# Patient Record
Sex: FEMALE | Race: WHITE | NOT HISPANIC OR LATINO | Employment: OTHER | URBAN - METROPOLITAN AREA
[De-identification: names, ages, dates, MRNs, and addresses within clinical notes are randomized per-mention and may not be internally consistent; named-entity substitution may affect disease eponyms.]

---

## 2017-01-04 ENCOUNTER — MYC MEDICAL ADVICE (OUTPATIENT)
Dept: FAMILY MEDICINE | Facility: CLINIC | Age: 67
End: 2017-01-04

## 2017-01-04 NOTE — TELEPHONE ENCOUNTER
amox with 1 refill rx'd last 6/16  Inquiring as to whether or not the refill was used.    Sylvie Wynn RN

## 2017-01-16 ENCOUNTER — OFFICE VISIT (OUTPATIENT)
Dept: FAMILY MEDICINE | Facility: CLINIC | Age: 67
End: 2017-01-16
Payer: COMMERCIAL

## 2017-01-16 ENCOUNTER — MYC MEDICAL ADVICE (OUTPATIENT)
Dept: FAMILY MEDICINE | Facility: CLINIC | Age: 67
End: 2017-01-16

## 2017-01-16 VITALS
BODY MASS INDEX: 41.18 KG/M2 | HEART RATE: 82 BPM | SYSTOLIC BLOOD PRESSURE: 114 MMHG | TEMPERATURE: 98.2 F | DIASTOLIC BLOOD PRESSURE: 67 MMHG | WEIGHT: 255 LBS | OXYGEN SATURATION: 97 % | RESPIRATION RATE: 18 BRPM

## 2017-01-16 DIAGNOSIS — M54.50 BILATERAL LOW BACK PAIN WITHOUT SCIATICA, UNSPECIFIED CHRONICITY: ICD-10-CM

## 2017-01-16 DIAGNOSIS — N39.0 URINARY TRACT INFECTION WITHOUT HEMATURIA, SITE UNSPECIFIED: Primary | ICD-10-CM

## 2017-01-16 LAB
ALBUMIN UR-MCNC: NEGATIVE MG/DL
APPEARANCE UR: ABNORMAL
BACTERIA #/AREA URNS HPF: ABNORMAL /HPF
BILIRUB UR QL STRIP: NEGATIVE
COLOR UR AUTO: YELLOW
GLUCOSE UR STRIP-MCNC: NEGATIVE MG/DL
HGB UR QL STRIP: ABNORMAL
KETONES UR STRIP-MCNC: NEGATIVE MG/DL
LEUKOCYTE ESTERASE UR QL STRIP: ABNORMAL
MUCOUS THREADS #/AREA URNS LPF: PRESENT /LPF
NITRATE UR QL: NEGATIVE
NON-SQ EPI CELLS #/AREA URNS LPF: ABNORMAL /LPF
PH UR STRIP: 6.5 PH (ref 5–7)
RBC #/AREA URNS AUTO: ABNORMAL /HPF (ref 0–2)
SP GR UR STRIP: 1.02 (ref 1–1.03)
URN SPEC COLLECT METH UR: ABNORMAL
UROBILINOGEN UR STRIP-ACNC: 0.2 EU/DL (ref 0.2–1)
WBC #/AREA URNS AUTO: ABNORMAL /HPF (ref 0–2)

## 2017-01-16 PROCEDURE — 87186 SC STD MICRODIL/AGAR DIL: CPT | Performed by: NURSE PRACTITIONER

## 2017-01-16 PROCEDURE — 87086 URINE CULTURE/COLONY COUNT: CPT | Performed by: NURSE PRACTITIONER

## 2017-01-16 PROCEDURE — 99214 OFFICE O/P EST MOD 30 MIN: CPT | Performed by: NURSE PRACTITIONER

## 2017-01-16 PROCEDURE — 81001 URINALYSIS AUTO W/SCOPE: CPT | Performed by: NURSE PRACTITIONER

## 2017-01-16 PROCEDURE — 87088 URINE BACTERIA CULTURE: CPT | Performed by: NURSE PRACTITIONER

## 2017-01-16 RX ORDER — CIPROFLOXACIN 250 MG/1
250 TABLET, FILM COATED ORAL 2 TIMES DAILY
Qty: 6 TABLET | Refills: 1 | Status: SHIPPED | OUTPATIENT
Start: 2017-01-16 | End: 2017-06-22

## 2017-01-16 NOTE — PROGRESS NOTES
"  SUBJECTIVE:                                                    Rhonda Trotter is a 66 year old female who presents to clinic today for the following health issues:      URINARY TRACT SYMPTOMS      Duration: Since Friday; last night got worse    Description  Pt explains she is having a very strong urge to use the bathroom, typically happening at night, happening every 20 minutes. Frequency and retention.     Pt went to Urgent care December 27 th for BM blockage? No blockage but full of stool. Pt used enema and liquid; symptoms cleared up. She has been regular since that time with her BM's.       Intensity:  moderate    Accompanying signs and symptoms:  Fever/chills: no, \"strange sensation in my core, that has happened before when I had this\".    Flank pain YES, from back injury.   Nausea and vomiting: no   Vaginal symptoms: no   Abdominal/Pelvic Pain: no     History  History of frequent UTI's: YES, not frequent. 3 times in the last year: In October, November, and during her past surgery last year.   History of kidney stones: no   Sexually Active: no   Possibility of pregnancy: No    Precipitating or alleviating factors: None    Therapies tried and outcome: Cipro: first dose seems to stop symptoms        She did have some loose stool after she was treated for constipation, which may have contributed to getting a UTI.     She fell on 12/17 and injured her low back.  Her back pain is improving, but she is still having some pain.  She denies any radicular pain, paresthesias, weakness.  She is going to her last PT appointment tomorrow after her knee replacement.       Problem list and histories reviewed & adjusted, as indicated.  Additional history: as documented    Problem list, Medication list, Allergies, and Medical/Social/Surgical histories reviewed in [facility] and updated as appropriate.    ROS:  C: NEGATIVE for fever, chills, change in weight  R: NEGATIVE for significant cough or SOB  CV: NEGATIVE for chest pain, " palpitations or peripheral edema  : see HPI    OBJECTIVE:                                                    /67 mmHg  Pulse 82  Temp(Src) 98.2  F (36.8  C) (Oral)  Resp 18  Wt 255 lb (115.667 kg)  SpO2 97%  LMP 08/01/2004  Body mass index is 41.18 kg/(m^2).  GENERAL: healthy, alert and no distress  RESP: lungs clear to auscultation - no rales, rhonchi or wheezes  CV: regular rate and rhythm, normal S1 S2, no S3 or S4, no murmur, click or rub, no peripheral edema and peripheral pulses strong  ABDOMEN: soft, nontender, no hepatosplenomegaly, no masses and bowel sounds normal    Diagnostic Test Results:  Results for orders placed or performed in visit on 01/16/17 (from the past 24 hour(s))   *UA reflex to Microscopic and Culture ([facility] [place] [facility] (except [place] and [place])   Result Value Ref Range    Color Urine Yellow     Appearance Urine Slightly Cloudy     Glucose Urine Negative NEG mg/dL    Bilirubin Urine Negative NEG    Ketones Urine Negative NEG mg/dL    Specific Gravity Urine 1.020 1.003 - 1.035    Blood Urine Small (A) NEG    pH Urine 6.5 5.0 - 7.0 pH    Protein Albumin Urine Negative NEG mg/dL    Urobilinogen Urine 0.2 0.2 - 1.0 EU/dL    Nitrite Urine Negative NEG    Leukocyte Esterase Urine Large (A) NEG    Source Midstream Urine    Urine Microscopic   Result Value Ref Range    WBC Urine  (A) 0 - 2 /HPF    RBC Urine 5-10 (A) 0 - 2 /HPF    Squamous Epithelial /LPF Urine Few FEW /LPF    Bacteria Urine Moderate (A) NEG /HPF    Mucous Urine Present (A) NEG /LPF        ASSESSMENT/PLAN:                                                            1. Urinary tract infection without hematuria, site unspecified  Increase fluids.   Discussed the use and indication of this medication as well as potential side effects.   - *UA reflex to Microscopic and Culture ([facility] and [facility] [place] [facility] (except [place] and [place])  - Urine Microscopic  - Urine Culture  Aerobic Bacterial  - ciprofloxacin (CIPRO) 250 MG tablet; Take 1 tablet (250 mg) by mouth 2 times daily  Dispense: 6 tablet; Refill: 1    2. Bilateral low back pain without sciatica, unspecified chronicity  Referral for PT given.   - [facility]FERR[facility]        Corina Lara NP  [facility]

## 2017-01-16 NOTE — NURSING NOTE
"Chief Complaint   Patient presents with     UTI       Initial /67 mmHg  Pulse 82  Temp(Src) 98.2  F (36.8  C) (Oral)  Resp 18  Wt 255 lb (115.667 kg)  SpO2 97%  LMP 08/01/2004 Estimated body mass index is 41.18 kg/(m^2) as calculated from the following:    Height as of 12/20/16: 5' 6\" (1.676 m).    Weight as of this encounter: 255 lb (115.667 kg).  BP completed using cuff size: efrem Doty Canine MA      "

## 2017-01-17 ENCOUNTER — THERAPY VISIT (OUTPATIENT)
Dept: PHYSICAL THERAPY | Facility: CLINIC | Age: 67
End: 2017-01-17
Payer: COMMERCIAL

## 2017-01-17 DIAGNOSIS — M25.561 ACUTE PAIN OF RIGHT KNEE: Primary | ICD-10-CM

## 2017-01-17 DIAGNOSIS — Z47.1 AFTERCARE FOLLOWING RIGHT KNEE JOINT REPLACEMENT SURGERY: ICD-10-CM

## 2017-01-17 DIAGNOSIS — Z96.651 AFTERCARE FOLLOWING RIGHT KNEE JOINT REPLACEMENT SURGERY: ICD-10-CM

## 2017-01-17 PROCEDURE — 97161 PT EVAL LOW COMPLEX 20 MIN: CPT | Mod: GP | Performed by: PHYSICAL THERAPIST

## 2017-01-17 PROCEDURE — 97112 NEUROMUSCULAR REEDUCATION: CPT | Mod: GP | Performed by: PHYSICAL THERAPIST

## 2017-01-17 PROCEDURE — 97110 THERAPEUTIC EXERCISES: CPT | Mod: GP | Performed by: PHYSICAL THERAPIST

## 2017-01-18 LAB
BACTERIA SPEC CULT: ABNORMAL
MICRO REPORT STATUS: ABNORMAL
MICROORGANISM SPEC CULT: ABNORMAL
SPECIMEN SOURCE: ABNORMAL

## 2017-05-16 ENCOUNTER — MYC MEDICAL ADVICE (OUTPATIENT)
Dept: FAMILY MEDICINE | Facility: CLINIC | Age: 67
End: 2017-05-16

## 2017-05-16 DIAGNOSIS — H26.9 CATARACT: Primary | ICD-10-CM

## 2017-06-19 ENCOUNTER — HOSPITAL ENCOUNTER (EMERGENCY)
Facility: CLINIC | Age: 67
Discharge: HOME | End: 2017-06-19
Attending: EMERGENCY MEDICINE | Admitting: EMERGENCY MEDICINE
Payer: COMMERCIAL

## 2017-06-19 ENCOUNTER — APPOINTMENT (OUTPATIENT)
Dept: GENERAL RADIOLOGY | Facility: CLINIC | Age: 67
End: 2017-06-19
Attending: EMERGENCY MEDICINE
Payer: COMMERCIAL

## 2017-06-19 ENCOUNTER — MYC MEDICAL ADVICE (OUTPATIENT)
Dept: FAMILY MEDICINE | Facility: CLINIC | Age: 67
End: 2017-06-19

## 2017-06-19 VITALS
TEMPERATURE: 97.9 F | RESPIRATION RATE: 16 BRPM | DIASTOLIC BLOOD PRESSURE: 86 MMHG | HEIGHT: 66 IN | BODY MASS INDEX: 41.78 KG/M2 | SYSTOLIC BLOOD PRESSURE: 163 MMHG | WEIGHT: 260 LBS | HEART RATE: 76 BPM | OXYGEN SATURATION: 98 %

## 2017-06-19 DIAGNOSIS — S52.532A CLOSED COLLES' FRACTURE OF LEFT RADIUS, INITIAL ENCOUNTER: ICD-10-CM

## 2017-06-19 PROCEDURE — 73110 X-RAY EXAM OF WRIST: CPT | Mod: LT

## 2017-06-19 PROCEDURE — 99285 EMERGENCY DEPT VISIT HI MDM: CPT | Mod: 25

## 2017-06-19 PROCEDURE — 25000132 ZZH RX MED GY IP 250 OP 250 PS 637: Performed by: EMERGENCY MEDICINE

## 2017-06-19 PROCEDURE — 40000277 XR SURGERY CARM FLUORO LESS THAN 5 MIN W STILLS

## 2017-06-19 PROCEDURE — 25605 CLTX DST RDL FX/EPHYS SEP W/: CPT | Mod: LT

## 2017-06-19 RX ORDER — OXYCODONE AND ACETAMINOPHEN 5; 325 MG/1; MG/1
2 TABLET ORAL ONCE
Status: COMPLETED | OUTPATIENT
Start: 2017-06-19 | End: 2017-06-19

## 2017-06-19 RX ORDER — OXYCODONE AND ACETAMINOPHEN 5; 325 MG/1; MG/1
1-2 TABLET ORAL EVERY 4 HOURS PRN
Qty: 15 TABLET | Refills: 0 | Status: SHIPPED | OUTPATIENT
Start: 2017-06-19 | End: 2017-06-22

## 2017-06-19 RX ADMIN — OXYCODONE HYDROCHLORIDE AND ACETAMINOPHEN 2 TABLET: 5; 325 TABLET ORAL at 10:31

## 2017-06-19 NOTE — DISCHARGE INSTRUCTIONS
Forearm Fracture That Needs to Be Set  You have a break or fracture of both bones in the forearm. The bones are out of place and must be set to make them straight again. This fracture usually takes 8 to 12 weeks to heal completely. Initial treatment is with a splint or cast. Severe injuries may need surgery to repair.    Home care    Keep your arm elevated to reduce pain and swelling.When sitting or lying down elevate your arm above the level of your heart. You can do this by placing your arm on a pillow that rests on your chest or on a pillow at your side. This is most important during the first 48 hours after injury.    Apply an ice pack over the injured area for 15 to 20 minutes every 3 to 6 hours. You should do this for the first 24 to 48 hours. You can make an ice pack by filling a plastic bag that seals at the top with ice cubes and then wrapping it with a thin towel. Be careful not to injure your skin with the ice treatments. Ice should never be applied directly to skin. As the ice melts, be careful that the cast or splint doesn t get wet. You can place the ice pack inside the sling and directly over the splint or cast. Continue with ice packs as needed for the relief of pain and swelling.    Keep the cast or splint completely dry at all times. Bathe with your cast or splint out of the water. Protect it with 2 large plastic bags, one outside of the other, each taped with duct tape at the top end. If a fiberglass splint or cast gets wet, you can dry it with a hair dryer on a cool setting.    You may use over-the-counter pain medicine to control pain, unless another pain medicine was prescribed. If you have chronic liver or kidney disease or ever had a stomach ulcer or GI bleeding, talk with your healthcare provider before using these medicines.  Follow-up care  Follow up with your healthcare provider, or as advised. If a splint was applied, it may be changed to a cast during your follow-up visit.  There is a  chance that the fractures will move out of place again before the ends begin to seal together. This usually happens during the first week. Therefore, it is important that you follow-up as directed for another X-ray. If X-rays were taken, you will be told of any new findings that may affect your care.  When to seek medical advice  Call your healthcare provider right away if any of the following occur:    The plaster cast or splint becomes wet or soft    The fiberglass cast or splint stays wet for more than 24 hours    Increased tightness, looseness, or pain occurs under the cast or splint    Fingers become swollen, cold, blue, numb, or tingly    The cast develops a foul odor  Date Last Reviewed: 12/3/2015    6264-3909 [employer]. [address] [place] [address]. All rights reserved. This information is not intended as a substitute for professional medical care. Always follow your healthcare professional's instructions.      Opioid Medication Information    You have been given a prescription for an opioid (narcotic) pain medicine and/or have received a pain medicine while here in the Emergency Department. These medicines can make you drowsy or impaired. You must not drive, operate dangerous equipment, or engage in any other dangerous activities while taking these medications. If you drive while taking these medications, you could be arrested for DUI, or driving under the influence. Do not drink any alcohol while you are taking these medications.   Opioid pain medications can cause addiction. If you have a history of chemical dependency of any type, you are at a higher risk of becoming addicted to pain medications.  Only take these prescribed medications to treat your pain when all other options have been tried. Take it for as short a time and as few doses as possible. Store your pain pills in a secure place, as they are frequently stolen and provide a dangerous opportunity for children or  visitors in your house to start abusing these powerful medications. We will not replace any lost or stolen medicine.  As soon as your pain is better, you should flush all your remaining medication.   Many prescription pain medications contain Tylenol  (acetaminophen), including Vicodin , Tylenol #3 , Norco , Lortab , and Percocet .  You should not take any extra pills of Tylenol  if you are using these prescription medications or you can get very sick.  Do not ever take more than 4000 mg of acetaminophen in any 24 hour period.  All opioids tend to cause constipation. Drink plenty of water and eat foods that have a lot of fiber, such as fruits, vegetables, prune juice, apple juice and high fiber cereal.  Take a laxative if you don t move your bowels at least every other day. Miralax , Milk of Magnesia, Colace , or Senna  can be used to keep you regular.

## 2017-06-19 NOTE — ED PROVIDER NOTES
History     Chief Complaint:  Wrist Pain      HPI   Rhonda Trotter is a 66 year old female with a history of arthritis who presents to the emergency department today for evaluation of wrist pain. The patient tripped on the edge of her patio and subsequently fell on concrete. Incident occurred this morning. She presents with left wrist pain that did not improve with ice as well as an abrasion to her right knee. She has no knee pain. She did injure her right hand when she was 11 and 21 years old. She has swelling     Allergies:  No Known Drug Allergies    Medications:    ciprofloxacin (CIPRO) 250 MG tablet  cyclobenzaprine (FLEXERIL) 10 MG tablet  acetaminophen (TYLENOL) 325 MG tablet  aspirin EC 81 MG tablet  diphenhydrAMINE (BENADRYL) 25 MG capsule  simvastatin (ZOCOR) 20 MG tablet  lisinopril-hydrochlorothiazide (PRINZIDE,ZESTORETIC) 20-12.5 MG per tablet  fluticasone (FLONASE) 50 MCG/ACT nasal spray  Cobalamine Combinations (B-12) 100-5000 MCG SUBL  Calcium-Phosphorus-Vitamin D (CALCIUM GUMMIES) 250-100-500 MG-MG-UNIT CHEW  multivitamin, therapeutic with minerals (THERA-VIT-M) TABS  levothyroxine (SYNTHROID,LEVOTHROID) 100 MCG tablet  calcium carbonate (TUMS) 500 MG chewable tablet     Past Medical History:    Arthritis   Obesity, unspecified   Pure hypercholesterolemia   Unspecified essential hypertension   Unspecified hypothyroidism     Past Surgical History:    ARTHROPLASTY KNEE Left 2/3/2016  breast biopsy was negative  COLONOSCOPY-every 5 years starting at age 52  SURGICAL HISTORY OF - wisdom teeth extraction    Family History:    Father: CAD, HTN, prostate cancer   Brother: CAD, MI  Mother: Colorectal cancer, MI, obesity   Paternal Uncle: Respiratory, heart disease  Daughter: Gynecology   Sister: Lipids, Pulmonary fibrosis, HTN, obesity     Social History:  The patient was accompanied to the ED by mother.  Smoking Status: Never smoker  Smokeless Tobacco: Never used  Alcohol Use: Yes  Marital Status:   "Single [1]     Review of Systems   Musculoskeletal:        Wrist pain   All other systems reviewed and are negative.    Physical Exam   Vitals:  Patient Vitals for the past 24 hrs:   BP Temp Temp src Pulse Resp SpO2 Height Weight   06/19/17 0935 - - - - - - 1.676 m (5' 6\") 117.9 kg (260 lb)   06/19/17 0932 163/86 97.9  F (36.6  C) Oral 76 16 97 % - -     Physical Exam  GENERAL: well developed, pleasant  HEAD: atraumatic  EYES: pupils reactive, extraocular muscles intact, conjunctivae normal  ENT:  mucus membranes moist  NECK:  trachea midline, normal range of motion  RESPIRATORY: no tachypnea, breath sounds clear to auscultation   CVS: normal S1/S2, no murmurs, intact distal pulses  ABDOMEN: soft, nontender, nondistention  MUSCULOSKELETAL:  Pain to the distal wrist. Skin intact. No obvious deformity.   SKIN: warm and dry, no acute rashes or ulceration. Abrasion to the right knee.  NEURO: GCS 15, cranial nerves intact, alert and oriented x3  PSYCH:  Mood/affect normal    Emergency Department Course   Imaging:  Radiology findings were communicated with the patient who voiced understanding of the findings.    Wrist XR, 3 Views, left:  There is a compression fracture of the distal radius with  dorsal angulation of the distal fracture fragment. No evidence for  intra-articular extension. Suspect mildly displaced ulnar styloid  fracture as well, although it is possible that this is chronic.   Reading per radiology    XR Surgery PAULA L/T 5 Min Fluoro w/ Stills:  There is a compression fracture of the distal radius with  dorsal angulation of the distal fracture fragment. No evidence for  intra-articular extension. Suspect mildly displaced ulnar styloid  fracture as well, although it is possible that this is chronic.   Reading per radiology    Procedures:    Reduction     SITE: Left wrist     PROCEDURE PROVIDER: Dr. Ortega      CONSENT: Risks (including but not limited to: decreased respirations, oxygen perfusion, aspiration, " hypotension), benefits, and alternatives were discussed with patient and consent for procedure was obtained.     REDUCTION COMMENTS: The patient's wrist was held in traction and injury mechanism was exaggerated and fracture was reduced.  C arm was used to note alignment.     Splint Placement    PLACEMENT: Custom Orthoglass splint was applied to the left extremity and after placement I checked and adjusted the fit to ensure proper positioning. The patient was more comfortable with the splint in place. Sensation and circulation are intact after splint placement.     Interventions:  1031- Percocet 2 tablets Oral        Emergency Department Course:  Nursing notes and vitals reviewed.  I performed an exam of the patient as documented above.       I discussed the treatment plan with the patient. They expressed understanding of this plan and consented to discharge.    I personally reviewed the laboratory results with the Patient and answered all related questions prior to discharge.    Impression & Plan      Medical Decision Making:  Rhonda Trotter is a 66 year old female who presents with wrist pain after a fall. XR shows angulation. She was hung in traps. Portable C arm showed good ailment with just the traps and traction. Manipulation was also performed and XR again looked to be in good alignment. Splint was applied by the tech and myself.      Diagnosis:    ICD-10-CM    1. Closed Colles' fracture of left radius, initial encounter S52.532A          Disposition:   The patient was discharged.    Discharge Medications:  Discharge Medication List as of 6/19/2017 12:00 PM      START taking these medications    Details   oxyCODONE-acetaminophen (PERCOCET) 5-325 MG per tablet Take 1-2 tablets by mouth every 4 hours as needed for pain, Disp-15 tablet, R-0, Local Print             Scribe Disclosure:  Bubba BAEZA am serving as a scribe at 9:51 AM on 6/19/2017 to document services personally performed by Norris Ortega,  MD, based on my observations and the provider's statements to me.    6/19/2017    EMERGENCY DEPARTMENT       Norris Ortega MD  06/21/17 1132

## 2017-06-19 NOTE — ED AVS SNAPSHOT
Emergency Department    [address] [facility] [address]    Phone:  [phone]    Fax:  [phone]                                       Rhonda Trotter   MRN: [id number]    Department:   Emergency Department   Date of Visit:  6/19/2017           Patient Information     Date Of Birth          1950        Your diagnoses for this visit were:     Closed Colles' fracture of left radius, initial encounter        You were seen by Norris Ortega MD.      Follow-up Information     Follow up with Lazara Ledbetter MD.    Specialty:  Orthopedics    Contact information:    [facility]  [address]  [facility] [address]  [phone]          Discharge Instructions         Forearm Fracture That Needs to Be Set  You have a break or fracture of both bones in the forearm. The bones are out of place and must be set to make them straight again. This fracture usually takes 8 to 12 weeks to heal completely. Initial treatment is with a splint or cast. Severe injuries may need surgery to repair.    Home care    Keep your arm elevated to reduce pain and swelling.When sitting or lying down elevate your arm above the level of your heart. You can do this by placing your arm on a pillow that rests on your chest or on a pillow at your side. This is most important during the first 48 hours after injury.    Apply an ice pack over the injured area for 15 to 20 minutes every 3 to 6 hours. You should do this for the first 24 to 48 hours. You can make an ice pack by filling a plastic bag that seals at the top with ice cubes and then wrapping it with a thin towel. Be careful not to injure your skin with the ice treatments. Ice should never be applied directly to skin. As the ice melts, be careful that the cast or splint doesn t get wet. You can place the ice pack inside the sling and directly over the splint or cast. Continue with ice packs as needed for the relief of pain and swelling.    Keep  the cast or splint completely dry at all times. Bathe with your cast or splint out of the water. Protect it with 2 large plastic bags, one outside of the other, each taped with duct tape at the top end. If a fiberglass splint or cast gets wet, you can dry it with a hair dryer on a cool setting.    You may use over-the-counter pain medicine to control pain, unless another pain medicine was prescribed. If you have chronic liver or kidney disease or ever had a stomach ulcer or GI bleeding, talk with your healthcare provider before using these medicines.  Follow-up care  Follow up with your healthcare provider, or as advised. If a splint was applied, it may be changed to a cast during your follow-up visit.  There is a chance that the fractures will move out of place again before the ends begin to seal together. This usually happens during the first week. Therefore, it is important that you follow-up as directed for another X-ray. If X-rays were taken, you will be told of any new findings that may affect your care.  When to seek medical advice  Call your healthcare provider right away if any of the following occur:    The plaster cast or splint becomes wet or soft    The fiberglass cast or splint stays wet for more than 24 hours    Increased tightness, looseness, or pain occurs under the cast or splint    Fingers become swollen, cold, blue, numb, or tingly    The cast develops a foul odor  Date Last Reviewed: 12/3/2015    6314-6930 The for; to (do). [address] [place] [address]. All rights reserved. This information is not intended as a substitute for professional medical care. Always follow your healthcare professional's instructions.      Opioid Medication Information    You have been given a prescription for an opioid (narcotic) pain medicine and/or have received a pain medicine while here in the Emergency Department. These medicines can make you drowsy or impaired. You must not drive, operate  dangerous equipment, or engage in any other dangerous activities while taking these medications. If you drive while taking these medications, you could be arrested for DUI, or driving under the influence. Do not drink any alcohol while you are taking these medications.   Opioid pain medications can cause addiction. If you have a history of chemical dependency of any type, you are at a higher risk of becoming addicted to pain medications.  Only take these prescribed medications to treat your pain when all other options have been tried. Take it for as short a time and as few doses as possible. Store your pain pills in a secure place, as they are frequently stolen and provide a dangerous opportunity for children or visitors in your house to start abusing these powerful medications. We will not replace any lost or stolen medicine.  As soon as your pain is better, you should flush all your remaining medication.   Many prescription pain medications contain Tylenol  (acetaminophen), including Vicodin , Tylenol #3 , Norco , Lortab , and Percocet .  You should not take any extra pills of Tylenol  if you are using these prescription medications or you can get very sick.  Do not ever take more than 4000 mg of acetaminophen in any 24 hour period.  All opioids tend to cause constipation. Drink plenty of water and eat foods that have a lot of fiber, such as fruits, vegetables, prune juice, apple juice and high fiber cereal.  Take a laxative if you don t move your bowels at least every other day. Miralax , Milk of Magnesia, Colace , or Senna  can be used to keep you regular.            Future Appointments        Provider Department Dept Phone Center    6/22/2017 4:00 PM Corina Lara NP [facility] [phone]       24 Hour Appointment Hotline       To make an appointment at any [facility], call [phone] ([phone]). If you don't have a family doctor or clinic, we will help you find one.  [facility] are conveniently located to serve the needs of you and your family.             Review of your medicines      START taking        Dose / Directions Last dose taken    oxyCODONE-acetaminophen 5-325 MG per tablet   Commonly known as:  PERCOCET   Dose:  1-2 tablet   Quantity:  15 tablet        Take 1-2 tablets by mouth every 4 hours as needed for pain   Refills:  0          Our records show that you are taking the medicines listed below. If these are incorrect, please call your family doctor or clinic.        Dose / Directions Last dose taken    acetaminophen 325 MG tablet   Commonly known as:  TYLENOL   Dose:  650 mg        Take 650 mg by mouth every 4 hours as needed for mild pain   Refills:  0        aspirin EC 81 MG EC tablet   Dose:  81 mg        Take 1 tablet (81 mg) by mouth daily   Refills:  0        B-12 100-5000 MCG Subl   Dose:  1 tablet   Quantity:  50 tablet        Place 1 tablet under the tongue daily as needed   Refills:  0        calcium carbonate 500 MG chewable tablet   Commonly known as:  TUMS   Dose:  1-2 chew tab        Take 1-2 chew tab by mouth daily as needed for heartburn   Refills:  0        Calcium-Phosphorus-Vitamin D 250-100-500 MG-MG-UNIT Chew   Commonly known as:  CALCIUM GUMMIES   Dose:  2 tablet        Take 2 tablets by mouth daily as needed   Refills:  0        ciprofloxacin 250 MG tablet   Commonly known as:  CIPRO   Dose:  250 mg   Quantity:  6 tablet        Take 1 tablet (250 mg) by mouth 2 times daily   Refills:  1        cyclobenzaprine 10 MG tablet   Commonly known as:  FLEXERIL   Dose:  5-10 mg   Quantity:  30 tablet        Take 0.5-1 tablets (5-10 mg) by mouth 3 times daily as needed for muscle spasms   Refills:  1        diphenhydrAMINE 25 MG capsule   Commonly known as:  BENADRYL   Dose:  25 mg   Quantity:  56 capsule        Take 1 capsule (25 mg) by mouth nightly as needed for sleep   Refills:  0        fluticasone 50 MCG/ACT spray   Commonly known as:   FLONASE   Dose:  2 spray   Quantity:  1 Bottle        Spray 2 sprays into both nostrils daily as needed   Refills:  11        levothyroxine 100 MCG tablet   Commonly known as:  SYNTHROID/LEVOTHROID   Dose:  100 mcg   Quantity:  90 tablet        Take 1 tablet (100 mcg) by mouth daily   Refills:  PRN        lisinopril-hydrochlorothiazide 20-12.5 MG per tablet   Commonly known as:  PRINZIDE/ZESTORETIC   Dose:  1 tablet   Quantity:  90 tablet        Take 1 tablet by mouth daily   Refills:  PRN        multivitamin, therapeutic with minerals Tabs tablet   Dose:  1 tablet   Quantity:  30 each        Take 1 tablet by mouth daily   Refills:  0        simvastatin 20 MG tablet   Commonly known as:  ZOCOR   Dose:  20 mg   Quantity:  90 tablet        Take 1 tablet (20 mg) by mouth At Bedtime at bedtime   Refills:  PRN                Prescriptions were sent or printed at these locations (1 Prescription)                   Other Prescriptions                Printed at Department/Unit printer (1 of 1)         oxyCODONE-acetaminophen (PERCOCET) 5-325 MG per tablet                Procedures and tests performed during your visit     Wrist XR, G/E 3 views, left    XR Surgery PAULA L/T 5 Min Fluoro w Stills      Orders Needing Specimen Collection     None      Pending Results     Date and Time Order Name Status Description    6/19/2017 1116 XR Surgery PAULA L/T 5 Min Fluoro w Stills In process             Pending Culture Results     No orders found from 6/17/2017 to 6/20/2017.            Pending Results Instructions     If you had any lab results that were not finalized at the time of your Discharge, you can call the ED Lab Result RN at [phone]. You will be contacted by this team for any positive Lab results or changes in treatment. The nurses are available 7 days a week from 10A to 6:30P.  You can leave a message 24 hours per day and they will return your call.        Test Results From Your Hospital Stay        6/19/2017 10:07 AM       Narrative     WRIST LEFT THREE OR MORE VIEWS  6/19/2017 9:52 AM    HISTORY:  Status post fall.    COMPARISON:  None.        Impression     IMPRESSION:  There is a compression fracture of the distal radius with  dorsal angulation of the distal fracture fragment. No evidence for  intra-articular extension. Suspect mildly displaced ulnar styloid  fracture as well, although it is possible that this is chronic.     TAMMIE RODRIGUEZ MD         6/19/2017 11:17 AM      Result not yet available     Exam Ended                Clinical Quality Measure: Blood Pressure Screening     Your blood pressure was checked while you were in the emergency department today. The last reading we obtained was  BP: 163/86 . Please read the guidelines below about what these numbers mean and what you should do about them.  If your systolic blood pressure (the top number) is less than 120 and your diastolic blood pressure (the bottom number) is less than 80, then your blood pressure is normal. There is nothing more that you need to do about it.  If your systolic blood pressure (the top number) is 120-139 or your diastolic blood pressure (the bottom number) is 80-89, your blood pressure may be higher than it should be. You should have your blood pressure rechecked within a year by a primary care provider.  If your systolic blood pressure (the top number) is 140 or greater or your diastolic blood pressure (the bottom number) is 90 or greater, you may have high blood pressure. High blood pressure is treatable, but if left untreated over time it can put you at risk for heart attack, stroke, or kidney failure. You should have your blood pressure rechecked by a primary care provider within the next 4 weeks.  If your provider in the emergency department today gave you specific instructions to follow-up with your doctor or provider even sooner than that, you should follow that instruction and not wait for up to 4 weeks for your follow-up visit.         Thank you for choosing [facility]       Thank you for choosing [facility] for your care. Our goal is always to provide you with excellent care. Hearing back from our patients is one way we can continue to improve our services. Please take a few minutes to complete the written survey that you may receive in the mail after you visit with us. Thank you!        [employer] Information     "[employer]" gives you secure access to your electronic health record. If you see a primary care provider, you can also send messages to your care team and make appointments. If you have questions, please call your primary care clinic.  If you do not have a primary care provider, please call [phone] and they will assist you.        Care EveryWhere ID     This is your Care EveryWhere ID. This could be used by other organizations to access your [facility] medical records  [id number]        After Visit Summary       This is your record. Keep this with you and show to your community pharmacist(s) and doctor(s) at your next visit.

## 2017-06-19 NOTE — ED AVS SNAPSHOT
Emergency Department    [address] [place] [address]    Phone:  [phone]    Fax:  [phone]                                       Rhonda Trotter   MRN: [id number]    Department:   Emergency Department   Date of Visit:  6/19/2017           After Visit Summary Signature Page     I have received my discharge instructions, and my questions have been answered. I have discussed any challenges I see with this plan with the nurse or doctor.    ..........................................................................................................................................  Patient/Patient Representative Signature      ..........................................................................................................................................  Patient Representative Print Name and Relationship to Patient    ..................................................               ................................................  Date                                            Time    ..........................................................................................................................................  Reviewed by Signature/Title    ...................................................              ..............................................  Date                                                            Time

## 2017-06-21 ENCOUNTER — TRANSFERRED RECORDS (OUTPATIENT)
Dept: HEALTH INFORMATION MANAGEMENT | Facility: CLINIC | Age: 67
End: 2017-06-21

## 2017-06-21 ENCOUNTER — MYC MEDICAL ADVICE (OUTPATIENT)
Dept: FAMILY MEDICINE | Facility: CLINIC | Age: 67
End: 2017-06-21

## 2017-06-22 ENCOUNTER — OFFICE VISIT (OUTPATIENT)
Dept: FAMILY MEDICINE | Facility: CLINIC | Age: 67
End: 2017-06-22
Payer: COMMERCIAL

## 2017-06-22 VITALS
BODY MASS INDEX: 42.45 KG/M2 | TEMPERATURE: 98.2 F | DIASTOLIC BLOOD PRESSURE: 70 MMHG | OXYGEN SATURATION: 98 % | WEIGHT: 263 LBS | SYSTOLIC BLOOD PRESSURE: 107 MMHG | HEART RATE: 84 BPM

## 2017-06-22 DIAGNOSIS — I10 BENIGN ESSENTIAL HYPERTENSION: ICD-10-CM

## 2017-06-22 DIAGNOSIS — E66.01 MORBID OBESITY WITH BMI OF 40.0-44.9, ADULT (H): ICD-10-CM

## 2017-06-22 DIAGNOSIS — Z01.818 PREOP GENERAL PHYSICAL EXAM: Primary | ICD-10-CM

## 2017-06-22 DIAGNOSIS — S52.532A CLOSED COLLES' FRACTURE OF LEFT RADIUS, INITIAL ENCOUNTER: ICD-10-CM

## 2017-06-22 LAB — HGB BLD-MCNC: 13.5 G/DL (ref 11.7–15.7)

## 2017-06-22 PROCEDURE — 36415 COLL VENOUS BLD VENIPUNCTURE: CPT | Performed by: NURSE PRACTITIONER

## 2017-06-22 PROCEDURE — 80048 BASIC METABOLIC PNL TOTAL CA: CPT | Performed by: NURSE PRACTITIONER

## 2017-06-22 PROCEDURE — 85018 HEMOGLOBIN: CPT | Performed by: NURSE PRACTITIONER

## 2017-06-22 PROCEDURE — 99214 OFFICE O/P EST MOD 30 MIN: CPT | Performed by: NURSE PRACTITIONER

## 2017-06-22 NOTE — MR AVS SNAPSHOT
After Visit Summary   6/22/2017    Rhonda Trotter    MRN: [id number]           Patient Information     Date Of Birth          1950        Visit Information        Provider Department      6/22/2017 4:00 PM Corina Lara NP [facility]        Today's Diagnoses     Preop general physical exam    -  1    Closed Colles' fracture of left radius, initial encounter        Benign essential hypertension          Care Instructions      Before Your Surgery      Call your surgeon if there is any change in your health. This includes signs of a cold or flu (such as a sore throat, runny nose, cough, rash or fever).    Do not smoke, drink alcohol or take over the counter medicine (unless your surgeon or primary care doctor tells you to) for the 24 hours before and after surgery.    If you take prescribed drugs: Follow your doctor s orders about which medicines to take and which to stop until after surgery.    Eating and drinking prior to surgery: follow the instructions from your surgeon    Take a shower or bath the night before surgery. Use the soap your surgeon gave you to gently clean your skin. If you do not have soap from your surgeon, use your regular soap. Do not shave or scrub the surgery site.  Wear clean pajamas and have clean sheets on your bed.           Follow-ups after your visit        Who to contact     If you have questions or need follow up information about today's clinic visit or your schedule please contact [facility] directly at [phone].  Normal or non-critical lab and imaging results will be communicated to you by MyChart, letter or phone within 4 business days after the clinic has received the results. If you do not hear from us within 7 days, please contact the clinic through MyChart or phone. If you have a critical or abnormal lab result, we will notify you by phone as soon as possible.  Submit refill requests through [employer] or call  your pharmacy and they will forward the refill request to us. Please allow 3 business days for your refill to be completed.          Additional Information About Your Visit        MyChart Information     [employer] gives you secure access to your electronic health record. If you see a primary care provider, you can also send messages to your care team and make appointments. If you have questions, please call your primary care clinic.  If you do not have a primary care provider, please call [phone] and they will assist you.        Care EveryWhere ID     This is your Care EveryWhere ID. This could be used by other organizations to access your [place] medical records  [id number]        Your Vitals Were     Pulse Temperature Last Period Pulse Oximetry BMI (Body Mass Index)       84 98.2  F (36.8  C) (Oral) 08/01/2004 98% 42.45 kg/m2        Blood Pressure from Last 3 Encounters:   06/22/17 107/70   06/19/17 163/86   01/16/17 114/67    Weight from Last 3 Encounters:   06/22/17 263 lb (119.3 kg)   06/19/17 260 lb (117.9 kg)   01/16/17 255 lb (115.7 kg)              We Performed the Following     Basic metabolic panel     Hemoglobin        Primary Care Provider Office Phone # Fax #    Corina Lara [phone]       [facility] [address] [facility] [address]        Equal Access to Services     ALAN PEÑALOZA : Hadii aad ku hadasho Soomaali, waaxda luqadaha, qaybta kaalmada adeegyada, evie giles . So [facility] [phone].    ATENCIÓN: Si habla español, tiene a clayton disposición servicios gratuitos de asistencia lingüística. López al [phone].    We comply with applicable federal civil rights laws and [place] laws. We do not discriminate on the basis of race, color, national origin, age, disability sex, sexual orientation or gender identity.            Thank you!     Thank you for choosing [facility]  for your care. Our goal is  always to provide you with excellent care. Hearing back from our patients is one way we can continue to improve our services. Please take a few minutes to complete the written survey that you may receive in the mail after your visit with us. Thank you!             Your Updated Medication List - Protect others around you: Learn how to safely use, store and throw away your medicines at www.disposemymeds.org.          This list is accurate as of: 6/22/17  5:04 PM.  Always use your most recent med list.                   Brand Name Dispense Instructions for use Diagnosis    acetaminophen 325 MG tablet    TYLENOL     Take 650 mg by mouth every 4 hours as needed for mild pain        aspirin EC 81 MG EC tablet      Take 1 tablet (81 mg) by mouth daily    Aftercare following knee joint replacement surgery, unspecified laterality       B-12 100-5000 MCG Subl     50 tablet    Place 1 tablet under the tongue daily as needed    Aftercare following knee joint replacement surgery, unspecified laterality       calcium carbonate 500 MG chewable tablet    TUMS     Take 1-2 chew tab by mouth daily as needed for heartburn        Calcium-Phosphorus-Vitamin D 250-100-500 MG-MG-UNIT Chew    CALCIUM GUMMIES     Take 2 tablets by mouth daily as needed    Preop general physical exam       diphenhydrAMINE 25 MG capsule    BENADRYL    56 capsule    Take 1 capsule (25 mg) by mouth nightly as needed for sleep    Aftercare following knee joint replacement surgery, unspecified laterality       fluticasone 50 MCG/ACT spray    FLONASE    1 Bottle    Spray 2 sprays into both nostrils daily as needed    Chronic rhinitis       levothyroxine 100 MCG tablet    SYNTHROID/LEVOTHROID    90 tablet    Take 1 tablet (100 mcg) by mouth daily    Hypothyroidism, unspecified type       lisinopril-hydrochlorothiazide 20-12.5 MG per tablet    PRINZIDE/ZESTORETIC    90 tablet    Take 1 tablet by mouth daily    Essential hypertension with goal blood pressure less  than 140/90       multivitamin, therapeutic with minerals Tabs tablet     30 each    Take 1 tablet by mouth daily    Preop general physical exam       simvastatin 20 MG tablet    ZOCOR    90 tablet    Take 1 tablet (20 mg) by mouth At Bedtime at bedtime    Hyperlipidemia LDL goal <130

## 2017-06-22 NOTE — NURSING NOTE
"Chief Complaint   Patient presents with     Pre-Op Exam     ER F/U     Dizziness     x 1 month. BPPV?? noticing when in bed and rolling over.        Initial /70  Pulse 84  Temp 98.2  F (36.8  C) (Oral)  Wt 263 lb (119.3 kg)  LMP 08/01/2004  SpO2 98%  BMI 42.45 kg/m2 Estimated body mass index is 42.45 kg/(m^2) as calculated from the following:    Height as of 6/19/17: 5' 6\" (1.676 m).    Weight as of this encounter: 263 lb (119.3 kg).  Medication Reconciliation: complete     Lalitha Almonte MA      "

## 2017-06-22 NOTE — PROGRESS NOTES
[address] [address]  [phone]  Dept: [phone]    PRE-OP EVALUATION:  Today's date: 2017    Rhonda Trotter (: 1950) presents for pre-operative evaluation assessment as requested by Dr. Ledbetter.  She requires evaluation and anesthesia risk assessment prior to undergoing surgery/procedure for treatment of compression fracture of the distal left radius .  Proposed procedure: ORIF left radius    Date of Surgery/ Procedure: 2017  Time of Surgery/ Procedure: check in at 12:30   Hospital/Surgical Facility: [facility]   Fax number for surgical facility:   Primary Physician: Corina Lara  Type of Anesthesia Anticipated: to be determined    Patient has a Health Care Directive or Living Will:  YES     Preop Questions 2017   1.  Do you have a history of heart attack, stroke, stent, bypass or surgery on an artery in the head, neck, heart or legs? No   2.  Do you ever have any pain or discomfort in your chest? No   3.  Do you have a history of  Heart Failure? No   4.   Are you troubled by shortness of breath when:  walking on a level surface, or up a slight hill, or at night? No   5.  Do you currently have a cold, bronchitis or other respiratory infection? No   6.  Do you have a cough, shortness of breath, or wheezing? No   7.  Do you sometimes get pains in the calves of your legs when you walk? No   8. Do you or anyone in your family have previous history of blood clots? YES - Pt's sister had one episode    9.  Do you or does anyone in your family have a serious bleeding problem such as prolonged bleeding following surgeries or cuts? No   10. Have you ever had problems with anemia or been told to take iron pills? No   11. Have you had any abnormal blood loss such as black, tarry or bloody stools, or abnormal vaginal bleeding? No   12. Have you ever had a blood transfusion? No   13. Have you or any of your relatives ever had  problems with anesthesia? No   14. Do you have sleep apnea, excessive snoring or daytime drowsiness? No   15. Do you have any prosthetic heart valves? No   16. Do you have prosthetic joints? YES - knee   17. Is there any chance that you may be pregnant? No         HPI:                                                      Brief HPI related to upcoming procedure: Fell and sustained a compression fracture distal left radius.       HYPERTENSION - Patient has longstanding history of mod-severe HTN , currently denies any symptoms referable to elevated blood pressure. Specifically denies chest pain, palpitations, dyspnea, orthopnea, PND or peripheral edema. Blood pressure readings have been in normal range. Current medication regimen is as listed below. Patient denies any side effects of medication.                                                                                                                                                                                          .    MEDICAL HISTORY:                                                      Patient Active Problem List    Diagnosis Date Noted     Right knee pain 10/24/2016     Priority: Medium     Aftercare following right knee joint replacement surgery 10/24/2016     Priority: Medium     Benign essential hypertension 10/20/2016     Priority: Medium     Physical deconditioning 10/17/2016     Priority: Medium     BMI of 40.0-44.9, adult (H) 09/15/2016     Priority: Medium     Left knee pain 02/17/2016     Priority: Medium     Other orthopedic aftercare(V54.89) 02/17/2016     Priority: Medium     Advance care planning 02/16/2016     Priority: Medium     Advance Care Planning 2/16/2016: Receipt of ACP document:  Received: Health Care Directive which was witnessed or notarized on 12/17/14.  Document previously scanned on 2/3/16.  Validation form completed and sent to be scanned.  Code Status reflects choices in most recent ACP document.  Confirmed/documented  designated decision maker(s).  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 Provider to review        Past Medical History:   Diagnosis Date     Arthritis      Obesity, unspecified      Pure hypercholesterolemia      Unspecified essential hypertension      Unspecified hypothyroidism      Past Surgical History:   Procedure Laterality Date     ARTHROPLASTY KNEE Left 2/3/2016    Procedure: ARTHROPLASTY KNEE;  Surgeon: Abdiel Ledesma MD;  Location:  OR     ARTHROPLASTY KNEE Right 10/11/2016    Procedure: ARTHROPLASTY KNEE;  Surgeon: Abdiel Ledesma MD;  Location:  OR     BIOPSY  August 2014    breast biopsy was negative     COLONOSCOPY  2013    every 5 years starting at age 52     SURGICAL HISTORY OF -       wisdom teeth extraction     Current Outpatient Prescriptions   Medication Sig Dispense Refill     acetaminophen (TYLENOL) 325 MG tablet Take 650 mg by mouth every 4 hours as needed for mild pain       diphenhydrAMINE (BENADRYL) 25 MG capsule Take 1 capsule (25 mg) by mouth nightly as needed for sleep 56 capsule      simvastatin (ZOCOR) 20 MG tablet Take 1 tablet (20 mg) by mouth At Bedtime at bedtime 90 tablet PRN     lisinopril-hydrochlorothiazide (PRINZIDE,ZESTORETIC) 20-12.5 MG per tablet Take 1 tablet by mouth daily 90 tablet PRN     fluticasone (FLONASE) 50 MCG/ACT nasal spray Spray 2 sprays into both nostrils daily as needed 1  Bottle 11     Cobalamine Combinations (B-12) 100-5000 MCG SUBL Place 1 tablet under the tongue daily as needed 50 tablet 0     Calcium-Phosphorus-Vitamin D (CALCIUM GUMMIES) 250-100-500 MG-MG-UNIT CHEW Take 2 tablets by mouth daily as needed       multivitamin, therapeutic with minerals (THERA-VIT-M) TABS Take 1 tablet by mouth daily 30 each 0     levothyroxine (SYNTHROID,LEVOTHROID) 100 MCG tablet Take 1 tablet (100 mcg) by mouth daily 90 tablet PRN     calcium carbonate (TUMS) 500 MG chewable tablet Take 1-2 chew tab by mouth daily as needed for heartburn       aspirin EC 81 MG tablet Take 1 tablet (81 mg) by mouth daily (Patient not taking: Reported on 6/22/2017)       OTC products: None, except as noted above    Allergies   Allergen Reactions     No Known Drug Allergies       Latex Allergy: NO    Social History   Substance Use Topics     Smoking status: Never Smoker     Smokeless tobacco: Never Used     Alcohol use Yes      Comment: very light use     History   Drug Use No       REVIEW OF SYSTEMS:                                                    C: NEGATIVE for fever, chills, change in weight  I: NEGATIVE for worrisome rashes, moles or lesions  E: NEGATIVE for vision changes or irritation  E/M: NEGATIVE for ear, mouth and throat problems  R: NEGATIVE for significant cough or SOB  B: NEGATIVE for masses, tenderness or discharge  CV: NEGATIVE for chest pain, palpitations or peripheral edema  GI: NEGATIVE for nausea, abdominal pain, heartburn, or change in bowel habits  : NEGATIVE for frequency, dysuria, or hematuria  N: NEGATIVE for weakness, dizziness or paresthesias  E: NEGATIVE for temperature intolerance, skin/hair changes  H: NEGATIVE for bleeding problems  P: NEGATIVE for changes in mood or affect    EXAM:                                                    /70  Pulse 84  Temp 98.2  F (36.8  C) (Oral)  Wt 263 lb (119.3 kg)  LMP 08/01/2004  SpO2 98%  BMI 42.45 kg/m2    GENERAL APPEARANCE:  healthy, alert and no distress     EYES: EOMI, PERRL     HENT: ear canals and TM's normal and nose and mouth without ulcers or lesions     NECK: no adenopathy, no asymmetry, masses, or scars and thyroid normal to palpation     RESP: lungs clear to auscultation - no rales, rhonchi or wheezes     CV: regular rates and rhythm, normal S1 S2, no S3 or S4 and no murmur, click or rub     ABDOMEN:  soft, nontender, no HSM or masses and bowel sounds normal     SKIN: no suspicious lesions or rashes     NEURO: Normal strength and tone, sensory exam grossly normal, mentation intact and speech normal     PSYCH: mentation appears normal. and affect normal/bright     LYMPHATICS: No axillary, cervical, or supraclavicular nodes    DIAGNOSTICS:                                                      Labs Drawn and in Process:   Unresulted Labs Ordered in the Past 30 Days of this Admission     Date and Time Order Name Status Description    6/22/2017 1656 BASIC METABOLIC PANEL In process           Recent Labs   Lab Test  10/24/16   1320  10/14/16   0610  10/13/16   0549   10/11/16   1810  10/11/16   1145   09/15/16   1038   09/14/15   1050   HGB  12.1   --   10.5*   < >   --    --    < >   --    < >   --    PLT   --   178   --    --   191   --    < >   --    < >   --    NA   --    --    --    --    --    --    --   134   --   132*   POTASSIUM   --    --    --    --    --   3.8   --   4.0   < >  4.0   CR   --   0.73   --    --   0.64   --    --   0.75   < >  0.78    < > = values in this interval not displayed.        IMPRESSION:                                                    Reason for surgery/procedure: Left radius ORIF    The proposed surgical procedure is considered INTERMEDIATE risk.    REVISED CARDIAC RISK INDEX  The patient has the following serious cardiovascular risks for perioperative complications such as (MI, PE, VFib and 3  AV Block):  No serious cardiac risks  INTERPRETATION: 0 risks: Class I (very low risk - 0.4%  complication rate)    The patient has the following additional risks for perioperative complications:  No identified additional risks      ICD-10-CM    1. Preop general physical exam Z01.818 Hemoglobin     Basic metabolic panel   2. Closed Colles' fracture of left radius, initial encounter S52.532A    3. Benign essential hypertension I10        RECOMMENDATIONS:                                                        --Patient is to take all scheduled medications on the day of surgery EXCEPT for modifications listed below.    APPROVAL GIVEN to proceed with proposed procedure, without further diagnostic evaluation       Signed Electronically by: Corina Lara NP    Copy of this evaluation report is provided to requesting physician.    [facility] Preop Guidelines

## 2017-06-23 LAB
ANION GAP SERPL CALCULATED.3IONS-SCNC: 12 MMOL/L (ref 3–14)
BUN SERPL-MCNC: 15 MG/DL (ref 7–30)
CALCIUM SERPL-MCNC: 9.3 MG/DL (ref 8.5–10.1)
CHLORIDE SERPL-SCNC: 100 MMOL/L (ref 94–109)
CO2 SERPL-SCNC: 21 MMOL/L (ref 20–32)
CREAT SERPL-MCNC: 0.74 MG/DL (ref 0.52–1.04)
GFR SERPL CREATININE-BSD FRML MDRD: 79 ML/MIN/1.7M2
GLUCOSE SERPL-MCNC: 122 MG/DL (ref 70–99)
POTASSIUM SERPL-SCNC: 3.8 MMOL/L (ref 3.4–5.3)
SODIUM SERPL-SCNC: 133 MMOL/L (ref 133–144)

## 2017-07-05 ENCOUNTER — MYC MEDICAL ADVICE (OUTPATIENT)
Dept: FAMILY MEDICINE | Facility: CLINIC | Age: 67
End: 2017-07-05

## 2017-07-05 ENCOUNTER — TRANSFERRED RECORDS (OUTPATIENT)
Dept: HEALTH INFORMATION MANAGEMENT | Facility: CLINIC | Age: 67
End: 2017-07-05

## 2017-07-05 RX ORDER — AMOXICILLIN 500 MG/1
2000 CAPSULE ORAL ONCE
Qty: 4 CAPSULE | Refills: 1 | Status: SHIPPED | OUTPATIENT
Start: 2017-07-05 | End: 2018-07-09

## 2017-08-02 ENCOUNTER — TRANSFERRED RECORDS (OUTPATIENT)
Dept: HEALTH INFORMATION MANAGEMENT | Facility: CLINIC | Age: 67
End: 2017-08-02

## 2017-09-18 DIAGNOSIS — E03.9 HYPOTHYROIDISM, UNSPECIFIED TYPE: ICD-10-CM

## 2017-09-18 DIAGNOSIS — E78.5 HYPERLIPIDEMIA LDL GOAL <130: ICD-10-CM

## 2017-09-18 DIAGNOSIS — I10 ESSENTIAL HYPERTENSION WITH GOAL BLOOD PRESSURE LESS THAN 140/90: ICD-10-CM

## 2017-09-18 DIAGNOSIS — J31.0 CHRONIC RHINITIS: ICD-10-CM

## 2017-09-21 RX ORDER — SIMVASTATIN 20 MG
TABLET ORAL
Qty: 30 TABLET | Refills: 0 | Status: SHIPPED | OUTPATIENT
Start: 2017-09-21 | End: 2017-10-03

## 2017-09-21 RX ORDER — LEVOTHYROXINE SODIUM 100 UG/1
TABLET ORAL
Qty: 30 TABLET | Refills: 0 | Status: SHIPPED | OUTPATIENT
Start: 2017-09-21 | End: 2017-10-03

## 2017-09-21 RX ORDER — LISINOPRIL AND HYDROCHLOROTHIAZIDE 12.5; 2 MG/1; MG/1
TABLET ORAL
Qty: 30 TABLET | Refills: 0 | Status: SHIPPED | OUTPATIENT
Start: 2017-09-21 | End: 2017-10-03

## 2017-09-21 RX ORDER — FLUTICASONE PROPIONATE 50 MCG
SPRAY, SUSPENSION (ML) NASAL
Qty: 16 G | Refills: 0 | Status: SHIPPED | OUTPATIENT
Start: 2017-09-21 | End: 2018-05-29

## 2017-09-21 NOTE — TELEPHONE ENCOUNTER
Medication is being filled for 1 time refill only due to:  has yearly physical set up on 10/3.   Steffi Esparza RN

## 2017-09-27 ENCOUNTER — TRANSFERRED RECORDS (OUTPATIENT)
Dept: HEALTH INFORMATION MANAGEMENT | Facility: CLINIC | Age: 67
End: 2017-09-27

## 2017-10-03 ENCOUNTER — OFFICE VISIT (OUTPATIENT)
Dept: FAMILY MEDICINE | Facility: CLINIC | Age: 67
End: 2017-10-03
Payer: MEDICARE

## 2017-10-03 VITALS
SYSTOLIC BLOOD PRESSURE: 112 MMHG | HEART RATE: 80 BPM | DIASTOLIC BLOOD PRESSURE: 75 MMHG | HEIGHT: 66 IN | WEIGHT: 263 LBS | TEMPERATURE: 97.8 F | RESPIRATION RATE: 18 BRPM | OXYGEN SATURATION: 96 % | BODY MASS INDEX: 42.27 KG/M2

## 2017-10-03 DIAGNOSIS — E78.5 HYPERLIPIDEMIA LDL GOAL <130: ICD-10-CM

## 2017-10-03 DIAGNOSIS — E03.9 HYPOTHYROIDISM, UNSPECIFIED TYPE: ICD-10-CM

## 2017-10-03 DIAGNOSIS — R73.09 ELEVATED GLUCOSE: ICD-10-CM

## 2017-10-03 DIAGNOSIS — I10 ESSENTIAL HYPERTENSION WITH GOAL BLOOD PRESSURE LESS THAN 140/90: ICD-10-CM

## 2017-10-03 DIAGNOSIS — Z00.00 ENCOUNTER FOR ROUTINE ADULT HEALTH EXAMINATION WITHOUT ABNORMAL FINDINGS: Primary | ICD-10-CM

## 2017-10-03 DIAGNOSIS — Z23 NEED FOR VACCINATION: ICD-10-CM

## 2017-10-03 DIAGNOSIS — Z78.0 ASYMPTOMATIC MENOPAUSAL STATE: ICD-10-CM

## 2017-10-03 DIAGNOSIS — Z23 NEED FOR PROPHYLACTIC VACCINATION AND INOCULATION AGAINST INFLUENZA: ICD-10-CM

## 2017-10-03 LAB
CHOLEST SERPL-MCNC: 195 MG/DL
GLUCOSE SERPL-MCNC: 92 MG/DL (ref 70–99)
HBA1C MFR BLD: 5.3 % (ref 4.3–6)
HDLC SERPL-MCNC: 90 MG/DL
LDLC SERPL CALC-MCNC: 79 MG/DL
NONHDLC SERPL-MCNC: 105 MG/DL
TRIGL SERPL-MCNC: 130 MG/DL
TSH SERPL DL<=0.005 MIU/L-ACNC: 2.46 MU/L (ref 0.4–4)

## 2017-10-03 PROCEDURE — G0009 ADMIN PNEUMOCOCCAL VACCINE: HCPCS | Performed by: NURSE PRACTITIONER

## 2017-10-03 PROCEDURE — 90662 IIV NO PRSV INCREASED AG IM: CPT | Performed by: NURSE PRACTITIONER

## 2017-10-03 PROCEDURE — 82947 ASSAY GLUCOSE BLOOD QUANT: CPT | Performed by: NURSE PRACTITIONER

## 2017-10-03 PROCEDURE — G0008 ADMIN INFLUENZA VIRUS VAC: HCPCS | Performed by: NURSE PRACTITIONER

## 2017-10-03 PROCEDURE — 90670 PCV13 VACCINE IM: CPT | Performed by: NURSE PRACTITIONER

## 2017-10-03 PROCEDURE — 84443 ASSAY THYROID STIM HORMONE: CPT | Performed by: NURSE PRACTITIONER

## 2017-10-03 PROCEDURE — G0402 INITIAL PREVENTIVE EXAM: HCPCS | Performed by: NURSE PRACTITIONER

## 2017-10-03 PROCEDURE — 80061 LIPID PANEL: CPT | Performed by: NURSE PRACTITIONER

## 2017-10-03 PROCEDURE — 83036 HEMOGLOBIN GLYCOSYLATED A1C: CPT | Performed by: NURSE PRACTITIONER

## 2017-10-03 PROCEDURE — 36415 COLL VENOUS BLD VENIPUNCTURE: CPT | Performed by: NURSE PRACTITIONER

## 2017-10-03 RX ORDER — SIMVASTATIN 20 MG
20 TABLET ORAL AT BEDTIME
Qty: 90 TABLET | Status: SHIPPED | OUTPATIENT
Start: 2017-10-03 | End: 2018-11-01

## 2017-10-03 RX ORDER — LISINOPRIL AND HYDROCHLOROTHIAZIDE 12.5; 2 MG/1; MG/1
1 TABLET ORAL DAILY
Qty: 90 TABLET | Status: SHIPPED | OUTPATIENT
Start: 2017-10-03 | End: 2018-11-01

## 2017-10-03 RX ORDER — LEVOTHYROXINE SODIUM 100 UG/1
100 TABLET ORAL DAILY
Qty: 90 TABLET | Status: SHIPPED | OUTPATIENT
Start: 2017-10-03 | End: 2017-10-30

## 2017-10-03 ASSESSMENT — ENCOUNTER SYMPTOMS: COUGH: 1

## 2017-10-03 NOTE — NURSING NOTE
"Chief Complaint   Patient presents with     Medicare Visit     Nasal Congestion     clear congestion, sneezing, post nasal drip. x 2 1/2 weeks      Cough     chest cough        Initial /75  Pulse 80  Temp 97.8  F (36.6  C) (Oral)  Resp 18  Ht 5' 5.75\" (1.67 m)  Wt 263 lb (119.3 kg)  LMP 08/01/2004  SpO2 96%  BMI 42.77 kg/m2 Estimated body mass index is 42.77 kg/(m^2) as calculated from the following:    Height as of this encounter: 5' 5.75\" (1.67 m).    Weight as of this encounter: 263 lb (119.3 kg).  Medication Reconciliation: complete     Lalitha Almonte MA      "

## 2017-10-03 NOTE — MR AVS SNAPSHOT
After Visit Summary   10/3/2017    Rhonda Trotter    MRN: [id number]           Patient Information     Date Of Birth          1950        Visit Information        Provider Department      10/3/2017 9:30 AM Corina Lara NP [facility]        Today's Diagnoses     Encounter for routine adult health examination without abnormal findings    -  1    Hypothyroidism, unspecified type        Essential hypertension with goal blood pressure less than 140/90        Hyperlipidemia LDL goal <130        Need for prophylactic vaccination and inoculation against influenza        Elevated glucose        Need for vaccination        Asymptomatic menopausal state          Care Instructions      Preventive Health Recommendations    Female Ages 65 +    Yearly exam:     See your health care provider every year in order to  o Review health changes.   o Discuss preventive care.    o Review your medicines if your doctor has prescribed any.      You no longer need a yearly Pap test unless you've had an abnormal Pap test in the past 10 years. If you have vaginal symptoms, such as bleeding or discharge, be sure to talk with your provider about a Pap test.      Every 1 to 2 years, have a mammogram.  If you are over 69, talk with your health care provider about whether or not you want to continue having screening mammograms.      Every 10 years, have a colonoscopy. Or, have a yearly FIT test (stool test). These exams will check for colon cancer.       Have a cholesterol test every 5 years, or more often if your doctor advises it.       Have a diabetes test (fasting glucose) every three years. If you are at risk for diabetes, you should have this test more often.       At age 65, have a bone density scan (DEXA) to check for osteoporosis (brittle bone disease).    Shots:    Get a flu shot each year.    Get a tetanus shot every 10 years.    Talk to your doctor about your pneumonia vaccines. There are  now two you should receive - Pneumovax (PPSV 23) and Prevnar (PCV 13).    Talk to your doctor about the shingles vaccine.    Talk to your doctor about the hepatitis B vaccine.    Nutrition:     Eat at least 5 servings of fruits and vegetables each day.      Eat whole-grain bread, whole-wheat pasta and brown rice instead of white grains and rice.      Talk to your provider about Calcium and Vitamin D.     Lifestyle    Exercise at least 150 minutes a week (30 minutes a day, 5 days a week). This will help you control your weight and prevent disease.      Limit alcohol to one drink per day.      No smoking.       Wear sunscreen to prevent skin cancer.       See your dentist twice a year for an exam and cleaning.      See your eye doctor every 1 to 2 years to screen for conditions such as glaucoma, macular degeneration and cataracts.          Follow-ups after your visit        Future tests that were ordered for you today     Open Future Orders        Priority Expected Expires Ordered    DX Hip/Pelvis/Spine Routine  10/3/2018 10/3/2017            Who to contact     If you have questions or need follow up information about today's clinic visit or your schedule please contact [facility] directly at [phone].  Normal or non-critical lab and imaging results will be communicated to you by [employer], letter or phone within 4 business days after the clinic has received the results. If you do not hear from us within 7 days, please contact the clinic through [employer] or phone. If you have a critical or abnormal lab result, we will notify you by phone as soon as possible.  Submit refill requests through [employer] or call your pharmacy and they will forward the refill request to us. Please allow 3 business days for your refill to be completed.          Additional Information About Your Visit        [employer] Information     [employer] gives you secure access to your electronic health record. If you see a primary care  "provider, you can also send messages to your care team and make appointments. If you have questions, please call your primary care clinic.  If you do not have a primary care provider, please call [phone] and they will assist you.        Care EveryWhere ID     This is your Care EveryWhere ID. This could be used by other organizations to access your [facility] medical records  [id number]        Your Vitals Were     Pulse Temperature Respirations Height Last Period Pulse Oximetry    80 97.8  F (36.6  C) (Oral) 18 5' 5.75\" (1.67 m) 08/01/2004 96%    BMI (Body Mass Index)                   42.77 kg/m2            Blood Pressure from Last 3 Encounters:   10/03/17 112/75   06/22/17 107/70   06/19/17 163/86    Weight from Last 3 Encounters:   10/03/17 263 lb (119.3 kg)   06/22/17 263 lb (119.3 kg)   06/19/17 260 lb (117.9 kg)              We Performed the Following     ADMIN 1st VACCINE     EA ADD'L VACCINE     FLU VACCINE, INCREASED ANTIGEN, PRESV FREE     Glucose     Hemoglobin A1c     Lipid panel reflex to direct LDL     PNEUMOCOCCAL CONJ VACCINE 13 VALENT IM     TSH with free T4 reflex          Today's Medication Changes          These changes are accurate as of: 10/3/17 10:40 AM.  If you have any questions, ask your nurse or doctor.               These medicines have changed or have updated prescriptions.        Dose/Directions    levothyroxine 100 MCG tablet   Commonly known as:  SYNTHROID/LEVOTHROID   This may have changed:  See the new instructions.   Used for:  Hypothyroidism, unspecified type   Changed by:  Corina Lara NP        Dose:  100 mcg   Take 1 tablet (100 mcg) by mouth daily   Quantity:  90 tablet   Refills:  PRN       lisinopril-hydrochlorothiazide 20-12.5 MG per tablet   Commonly known as:  PRINZIDE/ZESTORETIC   This may have changed:  See the new instructions.   Used for:  Essential hypertension with goal blood pressure less than 140/90   Changed by:  Corina Lara NP        Dose:  1 " tablet   Take 1 tablet by mouth daily   Quantity:  90 tablet   Refills:  PRN       simvastatin 20 MG tablet   Commonly known as:  ZOCOR   This may have changed:  See the new instructions.   Used for:  Hyperlipidemia LDL goal <130   Changed by:  Corina Lara NP        Dose:  20 mg   Take 1 tablet (20 mg) by mouth At Bedtime   Quantity:  90 tablet   Refills:  PRN            Where to get your medicines      These medications were sent to [facility] - [address] [address]     Phone:  [phone]     levothyroxine 100 MCG tablet    lisinopril-hydrochlorothiazide 20-12.5 MG per tablet    simvastatin 20 MG tablet                Primary Care Provider Office Phone # Fax #    Corina Lara [phone] [address] [facility] [address]        Equal Access to Services     [facility]: Hadii aad ku hadasho Soomaali, waaxda luqadaha, qaybta kaalmada adeegyada, waxay idiin hayaan adesusan giles . So [facility] [phone].    ATENCIÓN: Si habla español, tiene a clayton disposición servicios gratuitos de asistencia lingüística. [facility] [phone].    We comply with applicable federal civil rights laws and [place] laws. We do not discriminate on the basis of race, color, national origin, age, disability, sex, sexual orientation, or gender identity.            Thank you!     Thank you for choosing [facility]  for your care. Our goal is always to provide you with excellent care. Hearing back from our patients is one way we can continue to improve our services. Please take a few minutes to complete the written survey that you may receive in the mail after your visit with us. Thank you!             Your Updated Medication List - Protect others around you: Learn how to safely use, store and throw away your medicines at www.disposemymeds.org.          This list is accurate as of: 10/3/17 10:40 AM.   Always use your most recent med list.                   Brand Name Dispense Instructions for use Diagnosis    acetaminophen 325 MG tablet    TYLENOL     Take 650 mg by mouth every 4 hours as needed for mild pain        aspirin EC 81 MG EC tablet      Take 1 tablet (81 mg) by mouth daily    Aftercare following knee joint replacement surgery, unspecified laterality       B-12 100-5000 MCG Subl     50 tablet    Place 1 tablet under the tongue daily as needed    Aftercare following knee joint replacement surgery, unspecified laterality       calcium carbonate 500 MG chewable tablet    TUMS     Take 1-2 chew tab by mouth daily as needed for heartburn        Calcium-Phosphorus-Vitamin D 250-100-500 MG-MG-UNIT Chew    CALCIUM GUMMIES     Take 2 tablets by mouth daily as needed    Preop general physical exam       diphenhydrAMINE 25 MG capsule    BENADRYL    56 capsule    Take 1 capsule (25 mg) by mouth nightly as needed for sleep    Aftercare following knee joint replacement surgery, unspecified laterality       fluticasone 50 MCG/ACT spray    FLONASE    16 g    USE 2 SPRAYS IN EACH NOSTRIL DAILY    Chronic rhinitis       levothyroxine 100 MCG tablet    SYNTHROID/LEVOTHROID    90 tablet    Take 1 tablet (100 mcg) by mouth daily    Hypothyroidism, unspecified type       lisinopril-hydrochlorothiazide 20-12.5 MG per tablet    PRINZIDE/ZESTORETIC    90 tablet    Take 1 tablet by mouth daily    Essential hypertension with goal blood pressure less than 140/90       MAGNESIUM PO      Take by mouth daily Magnesium 7        multivitamin, therapeutic with minerals Tabs tablet     30 each    Take 1 tablet by mouth daily    Preop general physical exam       simvastatin 20 MG tablet    ZOCOR    90 tablet    Take 1 tablet (20 mg) by mouth At Bedtime    Hyperlipidemia LDL goal <130

## 2017-10-03 NOTE — PROGRESS NOTES
Injectable Influenza Immunization Documentation    Prior to injection verified patient identity using patient's name and date of birth.    1.  Is the person to be vaccinated sick today?  Nasal congestion and cough  Okay to administer per PCP    2. Does the person to be vaccinated have an allergy to a component   of the vaccine?   No    3. Has the person to be vaccinated ever had a serious reaction   to influenza vaccine in the past?   No    4. Has the person to be vaccinated ever had Guillain-Barré syndrome?   No    Form completed by Lalitha Almonte MA

## 2017-10-03 NOTE — PROGRESS NOTES
SUBJECTIVE:   Rhonda Trotter is a 67 year old female who presents for Preventive Visit.    Are you in the first 12 months of your Medicare coverage?  Yes,  Visual Acuity:  Right Eye: 20/20   Left Eye: 20/2  Both Eyes: 20/20    Cough     Physical   Annual:     Getting at least 3 servings of Calcium per day::  Yes    Bi-annual eye exam::  Yes    Dental care twice a year::  Yes    Sleep apnea or symptoms of sleep apnea::  None    Frequency of exercise::  2-3 days/week    Duration of exercise::  15-30 minutes    Taking medications regularly::  Yes    Medication side effects::  None    Additional concerns today::  YES    She has had nasal congestion, clear drainage, cough, post nasal drainage for the past 2 weeks.  No fevers, sinus pressure, shortness of breath.    COGNITIVE SCREEN  1) Repeat 3 items (Banana, Sunrise, Chair)    2) Clock draw: NORMAL  3) 3 item recall: Recalls 3 objects  Results: 3 items recalled: COGNITIVE IMPAIRMENT LESS LIKELY    Mini-CogTM Copyright YARI Hernandez. Licensed by the author for use in [facility]; reprinted with permission (hanny@[facility]). All rights reserved.          Reviewed and updated as needed this visit by clinical staffTobacco  Allergies  Meds  Med Hx  Surg Hx  Fam Hx  Soc Hx        Reviewed and updated as needed this visit by Provider        Social History   Substance Use Topics     Smoking status: Never Smoker     Smokeless tobacco: Never Used     Alcohol use Yes      Comment: very light use       The patient does not drink >3 drinks per day nor >7 drinks per week.            Today's PHQ-2 Score:   PHQ-2 ( 1999 [employer]) 10/2/2017   Q1: Little interest or pleasure in doing things 0   Q2: Feeling down, depressed or hopeless 0   PHQ-2 Score 0   Q1: Little interest or pleasure in doing things Not at all   Q2: Feeling down, depressed or hopeless Not at all   PHQ-2 Score 0       Do you feel safe in your environment - Yes    Do you have a Health Care Directive?: Yes:  Advance Directive has been received and scanned.    Current providers sharing in care for this patient include:   Patient Care Team:  Corina Lara, NP as PCP - General      Hearing impairment: Yes and No, Feel that people are mumbling or not speaking clearly.     Ability to successfully perform activities of daily living: Yes, no assistance needed     Fall risk:  Fallen 2 or more times in the past year?: No  Any fall with injury in the past year?: Yes (compression fracture had surgery this year)  Timed Up and Go Test (>13.5 is fall risk; contact physician) : 11      Home safety:  none identified      The following health maintenance items are reviewed in Epic and correct as of today:  Health Maintenance   Topic Date Due     FALL RISK ASSESSMENT  07/30/2017     DEXA SCAN SCREENING (SYSTEM ASSIGNED)  07/30/2017     PNEUMOCOCCAL (1 of 2 - PCV13) 07/30/2017     INFLUENZA VACCINE (SYSTEM ASSIGNED)  09/01/2017     TSH Q1 YEAR  09/15/2017     COLONOSCOPY Q5 YR  04/30/2018     BMP Q1 YR  06/22/2018     MAMMO SCREEN Q2 YR (SYSTEM ASSIGNED)  09/15/2018     ADVANCE DIRECTIVE PLANNING Q5 YRS  02/16/2021     TETANUS IMMUNIZATION (SYSTEM ASSIGNED)  05/06/2021     LIPID SCREEN Q5 YR FEMALE (SYSTEM ASSIGNED)  09/15/2021     HEPATITIS C SCREENING  Completed             ROS:  C: NEGATIVE for fever, chills, change in weight  INTEGUMENTARY/SKIN: NEGATIVE for worrisome rashes, moles or lesions  EYES: NEGATIVE for vision changes or irritation  ENT/MOUTH: see HPI  R: see HPI  CV: NEGATIVE for chest pain, palpitations or peripheral edema  GI: NEGATIVE for nausea, abdominal pain, heartburn, or change in bowel habits  MUSCULOSKELETAL: NEGATIVE for significant arthralgias or myalgia  NEURO: NEGATIVE for weakness, dizziness or paresthesias  ENDOCRINE: NEGATIVE for temperature intolerance, skin/hair changes  HEME/ALLERGY/IMMUNE: NEGATIVE for bleeding problems  PSYCHIATRIC: NEGATIVE for changes in mood or affect    OBJECTIVE:   /75   "Pulse 80  Temp 97.8  F (36.6  C) (Oral)  Resp 18  Ht 5' 5.75\" (1.67 m)  Wt 263 lb (119.3 kg)  LMP 08/01/2004  SpO2 96%  BMI 42.77 kg/m2 Estimated body mass index is 42.77 kg/(m^2) as calculated from the following:    Height as of this encounter: 5' 5.75\" (1.67 m).    Weight as of this encounter: 263 lb (119.3 kg).  EXAM:   GENERAL: healthy, alert and no distress  EYES: Eyes grossly normal to inspection, PERRL and conjunctivae and sclerae normal  HENT: ear canals and TM's normal, nose and mouth without ulcers or lesions  NECK: no adenopathy, no asymmetry, masses, or scars and thyroid normal to palpation  RESP: lungs clear to auscultation - no rales, rhonchi or wheezes  BREAST: normal without masses, tenderness or nipple discharge and no palpable axillary masses or adenopathy  CV: regular rate and rhythm, normal S1 S2, no S3 or S4, no murmur, click or rub, no peripheral edema and peripheral pulses strong  ABDOMEN: soft, nontender, no hepatosplenomegaly, no masses and bowel sounds normal  MS: no gross musculoskeletal defects noted, no edema  SKIN: no suspicious lesions or rashes  NEURO: Normal strength and tone, mentation intact and speech normal  PSYCH: mentation appears normal, affect normal/bright    ASSESSMENT / PLAN:   1. Encounter for routine adult health examination without abnormal findings    - Glucose    2. Hypothyroidism, unspecified type    - levothyroxine (SYNTHROID/LEVOTHROID) 100 MCG tablet; Take 1 tablet (100 mcg) by mouth daily  Dispense: 90 tablet; Refill: PRN  - TSH with free T4 reflex    3. Essential hypertension with goal blood pressure less than 140/90  At goal  The current medical regimen is effective;  continue present plan and medications.   - lisinopril-hydrochlorothiazide (PRINZIDE/ZESTORETIC) 20-12.5 MG per tablet; Take 1 tablet by mouth daily  Dispense: 90 tablet; Refill: PRN    4. Hyperlipidemia LDL goal <130  The current medical regimen is effective;  continue present plan and " "medications.   - simvastatin (ZOCOR) 20 MG tablet; Take 1 tablet (20 mg) by mouth At Bedtime  Dispense: 90 tablet; Refill: PRN  - Lipid panel reflex to direct LDL    5. Need for prophylactic vaccination and inoculation against influenza    - EA ADD'L VACCINE    6. Elevated glucose    - Hemoglobin A1c    7. Need for vaccination    - FLU VACCINE, INCREASED ANTIGEN, PRESV FREE  - PNEUMOCOCCAL CONJ VACCINE 13 VALENT IM  - ADMIN 1st VACCINE    8. Asymptomatic menopausal state    - DX Hip/Pelvis/Spine; Future    End of Life Planning:  Patient currently has an advanced directive: Yes.  Practitioner is supportive of decision.    COUNSELING:  Reviewed preventive health counseling, as reflected in patient instructions        Estimated body mass index is 42.77 kg/(m^2) as calculated from the following:    Height as of this encounter: 5' 5.75\" (1.67 m).    Weight as of this encounter: 263 lb (119.3 kg).  Weight management plan: Discussed healthy diet and exercise guidelines and patient will follow up in 12 months in clinic to re-evaluate.   reports that she has never smoked. She has never used smokeless tobacco.        Appropriate preventive services were discussed with this patient, including applicable screening as appropriate for cardiovascular disease, diabetes, osteopenia/osteoporosis, and glaucoma.  As appropriate for age/gender, discussed screening for colorectal cancer, prostate cancer, breast cancer, and cervical cancer. Checklist reviewing preventive services available has been given to the patient.    Reviewed patients plan of care and provided an AVS. The Basic Care Plan (routine screening as documented in Health Maintenance) for Rhonda meets the Care Plan requirement. This Care Plan has been established and reviewed with the Patient.    Counseling Resources:  ATP IV Guidelines  Pooled Cohorts Equation Calculator  Breast Cancer Risk Calculator  FRAX Risk Assessment  ICSI Preventive Guidelines  Dietary Guidelines " for Americans, 2010  USDA's MyPlate  ASA Prophylaxis  Lung CA Screening    Corina Lara NP  [facility] for HPI/ROS submitted by the patient on 10/2/2017   PHQ-2 Score: 0

## 2017-10-03 NOTE — NURSING NOTE
Screening Questionnaire for Adult Immunization    Are you sick today?   Cough, nasal congestion, no fever. Okay to administer per PCP    Do you have allergies to medications, food, a vaccine component or latex?   No   Have you ever had a serious reaction after receiving a vaccination?   No   Do you have a long-term health problem with heart disease, lung disease, asthma, kidney disease, metabolic disease (e.g. diabetes), anemia, or other blood disorder?   No   Do you have cancer, leukemia, HIV/AIDS, or any other immune system problem?   No   In the past 3 months, have you taken medications that affect  your immune system, such as prednisone, other steroids, or anticancer drugs; drugs for the treatment of rheumatoid arthritis, Crohn s disease, or psoriasis; or have you had radiation treatments?   No   Have you had a seizure, or a brain or other nervous system problem?   No   During the past year, have you received a transfusion of blood or blood     products, or been given immune (gamma) globulin or antiviral drug?   No   For women: Are you pregnant or is there a chance you could become        pregnant during the next month?   No   Have you received any vaccinations in the past 4 weeks?   No     Immunization questionnaire was positive for at least one answer.  Okay to administer per PCP.        Per orders of Corina Lara, injection of Prevnar 13 and flu vaccine given by Lalitha Almonte. Patient instructed to remain in clinic for 15 minutes afterwards, and to report any adverse reaction to me immediately.       Screening performed by Lalitha Almonte on 10/3/2017 at 10:30 AM.

## 2017-10-29 ENCOUNTER — MYC MEDICAL ADVICE (OUTPATIENT)
Dept: FAMILY MEDICINE | Facility: CLINIC | Age: 67
End: 2017-10-29

## 2017-10-29 DIAGNOSIS — E03.9 HYPOTHYROIDISM, UNSPECIFIED TYPE: ICD-10-CM

## 2017-10-30 RX ORDER — LEVOTHYROXINE SODIUM 100 UG/1
100 TABLET ORAL DAILY
Qty: 30 TABLET | Refills: 0 | Status: SHIPPED | OUTPATIENT
Start: 2017-10-30 | End: 2018-11-01

## 2017-10-31 ENCOUNTER — MYC MEDICAL ADVICE (OUTPATIENT)
Dept: FAMILY MEDICINE | Facility: CLINIC | Age: 67
End: 2017-10-31

## 2018-05-03 ENCOUNTER — TRANSFERRED RECORDS (OUTPATIENT)
Dept: HEALTH INFORMATION MANAGEMENT | Facility: CLINIC | Age: 68
End: 2018-05-03

## 2018-05-08 ENCOUNTER — RADIANT APPOINTMENT (OUTPATIENT)
Dept: BONE DENSITY | Facility: CLINIC | Age: 68
End: 2018-05-08
Attending: NURSE PRACTITIONER
Payer: MEDICARE

## 2018-05-08 DIAGNOSIS — Z78.0 ASYMPTOMATIC MENOPAUSAL STATE: ICD-10-CM

## 2018-05-08 PROCEDURE — 77080 DXA BONE DENSITY AXIAL: CPT | Performed by: INTERNAL MEDICINE

## 2018-05-17 ENCOUNTER — MYC MEDICAL ADVICE (OUTPATIENT)
Dept: FAMILY MEDICINE | Facility: CLINIC | Age: 68
End: 2018-05-17

## 2018-05-17 NOTE — TELEPHONE ENCOUNTER
[employer] advising to contact clinic for scheduling or use feature in [employer] please    En Finn RN

## 2018-05-29 ENCOUNTER — OFFICE VISIT (OUTPATIENT)
Dept: FAMILY MEDICINE | Facility: CLINIC | Age: 68
End: 2018-05-29
Payer: MEDICARE

## 2018-05-29 VITALS
HEIGHT: 66 IN | SYSTOLIC BLOOD PRESSURE: 105 MMHG | WEIGHT: 274.5 LBS | DIASTOLIC BLOOD PRESSURE: 66 MMHG | TEMPERATURE: 97.7 F | RESPIRATION RATE: 18 BRPM | HEART RATE: 74 BPM | OXYGEN SATURATION: 96 % | BODY MASS INDEX: 44.11 KG/M2

## 2018-05-29 DIAGNOSIS — M81.0 OSTEOPOROSIS, UNSPECIFIED OSTEOPOROSIS TYPE, UNSPECIFIED PATHOLOGICAL FRACTURE PRESENCE: Primary | ICD-10-CM

## 2018-05-29 DIAGNOSIS — J31.0 CHRONIC RHINITIS, UNSPECIFIED TYPE: ICD-10-CM

## 2018-05-29 DIAGNOSIS — I10 BENIGN ESSENTIAL HYPERTENSION: ICD-10-CM

## 2018-05-29 DIAGNOSIS — E87.1 HYPONATREMIA: ICD-10-CM

## 2018-05-29 PROCEDURE — 99214 OFFICE O/P EST MOD 30 MIN: CPT | Performed by: NURSE PRACTITIONER

## 2018-05-29 PROCEDURE — 80048 BASIC METABOLIC PNL TOTAL CA: CPT | Performed by: NURSE PRACTITIONER

## 2018-05-29 PROCEDURE — 82306 VITAMIN D 25 HYDROXY: CPT | Performed by: NURSE PRACTITIONER

## 2018-05-29 PROCEDURE — 36415 COLL VENOUS BLD VENIPUNCTURE: CPT | Performed by: NURSE PRACTITIONER

## 2018-05-29 RX ORDER — FLUTICASONE PROPIONATE 50 MCG
SPRAY, SUSPENSION (ML) NASAL
Qty: 16 G | COMMUNITY
Start: 2018-05-29

## 2018-05-29 NOTE — MR AVS SNAPSHOT
After Visit Summary   5/29/2018    Rhonda Trotter    MRN: [id number]           Patient Information     Date Of Birth          1950        Visit Information        Provider Department      5/29/2018 2:00 PM Corina Lara NP [facility]        Today's Diagnoses     Osteoporosis, unspecified osteoporosis type, unspecified pathological fracture presence    -  1    Benign essential hypertension        Chronic rhinitis, unspecified type           Follow-ups after your visit        Additional Services     [facility] PT, HAND, AND CHIROPRACTIC REFERRAL       **This order will print in the [facility] Scheduling Office**    Physical Therapy, Hand Therapy and Chiropractic Care are available through:    *[facility]  *[facility]  *[facility]    Call one number to schedule at any of the above locations: [phone].    Your provider has referred you to: Physical Therapy at [facility] or [facility]    Indication/Reason for Referral: Women's Health (Please Complete Special Programs SmartList)  Onset of Illness:   Therapy Orders: Evaluate and Treat  Special Programs: None and Women's Health: Osteoporosis/Osteopenia: Osteoporotic T-Score -2.6 (lumbar)  Special Request: Kathrin Reyez      Additional Comments for the Therapist or Chiropractor:     Please be aware that coverage of these services is subject to the terms and limitations of your health insurance plan.  Call member services at your health plan with any benefit or coverage questions.      Please bring the following to your appointment:    *Your personal calendar for scheduling future appointments  *Comfortable clothing                  Who to contact     If you have questions or need follow up information about today's clinic visit or your schedule please contact [facility] directly at [phone].  Normal or non-critical lab and imaging results will be communicated  "to you by [employer], letter or phone within 4 business days after the clinic has received the results. If you do not hear from us within 7 days, please contact the clinic through [employer] or phone. If you have a critical or abnormal lab result, we will notify you by phone as soon as possible.  Submit refill requests through [employer] or call your pharmacy and they will forward the refill request to us. Please allow 3 business days for your refill to be completed.          Additional Information About Your Visit        [employer] Information     [employer] gives you secure access to your electronic health record. If you see a primary care provider, you can also send messages to your care team and make appointments. If you have questions, please call your primary care clinic.  If you do not have a primary care provider, please call [phone] and they will assist you.        Care EveryWhere ID     This is your Care EveryWhere ID. This could be used by other organizations to access your [facility] medical records  [id number]        Your Vitals Were     Pulse Temperature Respirations Height Last Period Pulse Oximetry    74 97.7  F (36.5  C) (Oral) 18 5' 5.75\" (1.67 m) 08/01/2004 96%    Breastfeeding? BMI (Body Mass Index)                No 44.64 kg/m2           Blood Pressure from Last 3 Encounters:   05/29/18 105/66   10/03/17 112/75   06/22/17 107/70    Weight from Last 3 Encounters:   05/29/18 274 lb 8 oz (124.5 kg)   10/03/17 263 lb (119.3 kg)   06/22/17 263 lb (119.3 kg)              We Performed the Following     Basic metabolic panel  (Ca, Cl, CO2, Creat, Gluc, K, Na, BUN)     [facility] PT, HAND, AND CHIROPRACTIC REFERRAL     Vitamin D Deficiency        Primary Care Provider Office Phone # Fax #    Corina Lara [phone] [id number] [facility] [address]        Equal Access to Services     ALAN PEÑALOZA AH: Hadii agus ku hadasho Soomaali, waaxda luqadaha, qaybta kaalmada adeegyada, evie kelly " dania leegil labonniekody ah. So [facility] [phone].    ATENCIÓN: Si habla wolf, tiene a clayton disposición servicios gratuitos de asistencia lingüística. López huynh [phone].    We comply with applicable federal civil rights laws and [place] laws. We do not discriminate on the basis of race, color, national origin, age, disability, sex, sexual orientation, or gender identity.            Thank you!     Thank you for choosing [facility]  for your care. Our goal is always to provide you with excellent care. Hearing back from our patients is one way we can continue to improve our services. Please take a few minutes to complete the written survey that you may receive in the mail after your visit with us. Thank you!             Your Updated Medication List - Protect others around you: Learn how to safely use, store and throw away your medicines at www.disposemymeds.org.          This list is accurate as of 5/29/18  2:46 PM.  Always use your most recent med list.                   Brand Name Dispense Instructions for use Diagnosis    aspirin 81 MG EC tablet      Take 1 tablet (81 mg) by mouth daily    Aftercare following knee joint replacement surgery, unspecified laterality       B-12 100-5000 MCG Subl     50 tablet    Place 1 tablet under the tongue daily as needed    Aftercare following knee joint replacement surgery, unspecified laterality       calcium carbonate 500 MG chewable tablet    TUMS     Take 1-2 chew tab by mouth daily as needed for heartburn        Calcium-Phosphorus-Vitamin D 250-100-500 MG-MG-UNIT Chew    CALCIUM GUMMIES     Take 2 tablets by mouth daily as needed    Preop general physical exam       diphenhydrAMINE 25 MG capsule    BENADRYL    56 capsule    Take 1 capsule (25 mg) by mouth nightly as needed for sleep    Aftercare following knee joint replacement surgery, unspecified laterality       fluticasone 50 MCG/ACT spray    FLONASE    16 g    USE 2 SPRAYS IN EACH NOSTRIL  DAILY    Chronic rhinitis, unspecified type       levothyroxine 100 MCG tablet    SYNTHROID/LEVOTHROID    30 tablet    Take 1 tablet (100 mcg) by mouth daily    Hypothyroidism, unspecified type       lisinopril-hydrochlorothiazide 20-12.5 MG per tablet    PRINZIDE/ZESTORETIC    90 tablet    Take 1 tablet by mouth daily    Essential hypertension with goal blood pressure less than 140/90       MAGNESIUM PO      Take by mouth daily Magnesium 7        multivitamin, therapeutic with minerals Tabs tablet     30 each    Take 1 tablet by mouth daily    Preop general physical exam       simvastatin 20 MG tablet    ZOCOR    90 tablet    Take 1 tablet (20 mg) by mouth At Bedtime    Hyperlipidemia LDL goal <130

## 2018-05-29 NOTE — PROGRESS NOTES
"  SUBJECTIVE:   Rhonda Trotter is a 67 year old female who presents to clinic today for the following health issues:    Review Results Bone Density Scan from 5/8/2018    She did have a fracture of her left wrist after tripping and falling.  She takes calcium with vitamin D.  She does not smoke or drink alcohol, is not on steroids, no family history of hip fracture.                  Problem list and histories reviewed & adjusted, as indicated.  Additional history: as documented        Reviewed and updated as needed this visit by clinical staff       Reviewed and updated as needed this visit by Provider         ROS:  CONSTITUTIONAL: NEGATIVE for fever, chills, change in weight  ENT/MOUTH: NEGATIVE for ear, mouth and throat problems  RESP: NEGATIVE for significant cough or SOB  CV: NEGATIVE for chest pain, palpitations or peripheral edema  GI: NEGATIVE for nausea, abdominal pain, heartburn, or change in bowel habits  MUSCULOSKELETAL: NEGATIVE for significant arthralgias or myalgia  NEURO: NEGATIVE for weakness, dizziness or paresthesias  PSYCHIATRIC: NEGATIVE for changes in mood or affect    OBJECTIVE:     /66  Pulse 74  Temp 97.7  F (36.5  C) (Oral)  Resp 18  Ht 5' 5.75\" (1.67 m)  Wt 274 lb 8 oz (124.5 kg)  LMP 08/01/2004  SpO2 96%  Breastfeeding? No  BMI 44.64 kg/m2  Body mass index is 44.64 kg/(m^2).  GENERAL: healthy, alert and no distress  PSYCH: mentation appears normal, affect normal/bright        ASSESSMENT/PLAN:             1. Osteoporosis, unspecified osteoporosis type, unspecified pathological fracture presence  Reviewed DEXA which showed osteoporosis of lumbar spine, osteopenia hips.  FRAX score of 13% ad 1.5%  Discussed treatment options.  Will check vitamin D, work on weight bearing exercise and refer to PT.  Recheck DEXA in 2 years.   - Vitamin D Deficiency  - [facility], HAND, AND CHIROPRACTIC REFERRAL    2. Benign essential hypertension  At goal  The current medical regimen is effective; "  continue present plan and medications.   - Basic metabolic panel  (Ca, Cl, CO2, Creat, Gluc, K, Na, BUN)    3. Chronic rhinitis, unspecified type    - fluticasone (FLONASE) 50 MCG/ACT spray; USE 2 SPRAYS IN EACH NOSTRIL DAILY  Dispense: 16 g; Refill: PRN        Corina Lara, YO  [facility]

## 2018-05-30 LAB
ANION GAP SERPL CALCULATED.3IONS-SCNC: 11 MMOL/L (ref 3–14)
BUN SERPL-MCNC: 11 MG/DL (ref 7–30)
CALCIUM SERPL-MCNC: 9.1 MG/DL (ref 8.5–10.1)
CHLORIDE SERPL-SCNC: 97 MMOL/L (ref 94–109)
CO2 SERPL-SCNC: 22 MMOL/L (ref 20–32)
CREAT SERPL-MCNC: 0.72 MG/DL (ref 0.52–1.04)
DEPRECATED CALCIDIOL+CALCIFEROL SERPL-MC: 36 UG/L (ref 20–75)
GFR SERPL CREATININE-BSD FRML MDRD: 80 ML/MIN/1.7M2
GLUCOSE SERPL-MCNC: 98 MG/DL (ref 70–99)
POTASSIUM SERPL-SCNC: 3.8 MMOL/L (ref 3.4–5.3)
SODIUM SERPL-SCNC: 130 MMOL/L (ref 133–144)

## 2018-05-31 ENCOUNTER — MYC MEDICAL ADVICE (OUTPATIENT)
Dept: FAMILY MEDICINE | Facility: CLINIC | Age: 68
End: 2018-05-31

## 2018-06-10 ENCOUNTER — OFFICE VISIT (OUTPATIENT)
Dept: URGENT CARE | Facility: URGENT CARE | Age: 68
End: 2018-06-10
Payer: MEDICARE

## 2018-06-10 ENCOUNTER — RADIANT APPOINTMENT (OUTPATIENT)
Dept: GENERAL RADIOLOGY | Facility: CLINIC | Age: 68
End: 2018-06-10
Attending: PHYSICIAN ASSISTANT
Payer: MEDICARE

## 2018-06-10 VITALS
HEART RATE: 78 BPM | BODY MASS INDEX: 43.71 KG/M2 | DIASTOLIC BLOOD PRESSURE: 67 MMHG | TEMPERATURE: 97.4 F | SYSTOLIC BLOOD PRESSURE: 127 MMHG | OXYGEN SATURATION: 96 % | WEIGHT: 272 LBS | HEIGHT: 66 IN

## 2018-06-10 DIAGNOSIS — R05.9 COUGH: ICD-10-CM

## 2018-06-10 DIAGNOSIS — R05.9 COUGH: Primary | ICD-10-CM

## 2018-06-10 DIAGNOSIS — J98.01 ACUTE BRONCHOSPASM: ICD-10-CM

## 2018-06-10 PROCEDURE — 71046 X-RAY EXAM CHEST 2 VIEWS: CPT

## 2018-06-10 PROCEDURE — 99213 OFFICE O/P EST LOW 20 MIN: CPT | Performed by: PHYSICIAN ASSISTANT

## 2018-06-10 RX ORDER — BENZONATATE 200 MG/1
200 CAPSULE ORAL 3 TIMES DAILY PRN
Qty: 21 CAPSULE | Refills: 0 | Status: SHIPPED | OUTPATIENT
Start: 2018-06-10 | End: 2018-11-01

## 2018-06-10 RX ORDER — ALBUTEROL SULFATE 90 UG/1
2 AEROSOL, METERED RESPIRATORY (INHALATION) EVERY 6 HOURS PRN
Qty: 1 INHALER | Refills: 0 | Status: SHIPPED | OUTPATIENT
Start: 2018-06-10 | End: 2018-11-01

## 2018-06-10 NOTE — MR AVS SNAPSHOT
After Visit Summary   6/10/2018    Rhonda Trotter    MRN: [id number]           Patient Information     Date Of Birth          1950        Visit Information        Provider Department      6/10/2018 3:55 PM Steph Paz PA-C [facility]        Today's Diagnoses     Cough    -  1    Acute bronchospasm          Care Instructions       * VIRAL RESPIRATORY ILLNESS w/ Wheezing [Adult]  You have an Upper Respiratory Illness (URI) caused by a virus. This illness is contagious during the first few days. It is spread through the air by coughing and sneezing or by direct contact (touching the sick person and then touching your own eyes, nose or mouth). When the infection causes a lot of irritation, the air passages can go into spasm. This causes wheezing and shortness of breath.    Most viral illnesses resolve within 7-10 days with rest and simple home remedies, although the illness may sometimes last for several weeks. Antibiotics will not kill a virus and are generally not prescribed for this condition.  HOME CARE:      If symptoms are severe, rest at home for the first 2-3 days. When resuming activity, don't let yourself become overly tired.    Avoid exposure to cigarette smoke (yours or others).    You may use acetaminophen (Tylenol) 650-1000 mg every 6 hours or ibuprofen (Motrin, Advil) 600 mg every 6-8 hours with food to control pain, if you are able to take these medicines. [ NOTE : If you have chronic liver or kidney disease or ever had a stomach ulcer or GI bleeding, talk with your doctor before using these medicines.] (Aspirin should never be used in anyone under 18 years of age who is ill with a fever. It may cause severe liver damage.    Your appetite may be poor so a light diet is fine. Avoid dehydration by drinking 6-8 glasses of fluids per day (water, soft drinks, juices, tea, soup, etc.). Extra fluids will help loosen secretions in the nose and  lungs.    Over-the-counter cold medicines will not shorten the duration of the illness, but may be helpful for the following symptoms: cough (Robitussin DM); sore throat (Chloraseptic lozenges or spray); nasal and sinus congestion (Actifed or Sudafed). [NOTE: Do not use decongestants if you have high blood pressure.]  FOLLOW UP with your doctor or as advised if you are not improving over the next week.  GET PROMPT MEDICAL ATTENTION if any of the following occur:    Cough with lots of colored sputum (mucus) or blood in your sputum    Chest pain, shortness of breath, wheezing or difficulty breathing    Severe headache; face, neck or ear pain    Fever over 101 F (38.3 C) for more than three days    Unable to swallow due to throat pain    9597-5645 [employer]. [address] [place] [address]. All rights reserved. This information is not intended as a substitute for professional medical care. Always follow your healthcare professional's instructions.  This information has been modified by your health care provider with permission from the publisher.            Follow-ups after your visit        Your next 10 appointments already scheduled     Jun 28, 2018 11:30 AM CDT   (Arrive by 11:15 AM)   HERNESTO For Women Only with Gifty Buckner PT   [facility] [place] ([facility])    [address] [place] [address] [phone]              Who to contact     If you have questions or need follow up information about today's clinic visit or your schedule please contact [facility] directly at [phone].  Normal or non-critical lab and imaging results will be communicated to you by MyChart, letter or phone within 4 business days after the clinic has received the results. If you do not hear from us within 7 days, please contact the clinic through MyChart or phone. If you have a critical or abnormal lab result, we will notify you by phone as soon as  "possible.  Submit refill requests through [employer] or call your pharmacy and they will forward the refill request to us. Please allow 3 business days for your refill to be completed.          Additional Information About Your Visit        [employer] Information     [employer] gives you secure access to your electronic health record. If you see a primary care provider, you can also send messages to your care team and make appointments. If you have questions, please call your primary care clinic.  If you do not have a primary care provider, please call [phone] and they will assist you.        Care EveryWhere ID     This is your Care EveryWhere ID. This could be used by other organizations to access your [facility] medical records  [id number]        Your Vitals Were     Pulse Temperature Height Last Period Pulse Oximetry BMI (Body Mass Index)    78 97.4  F (36.3  C) (Tympanic) 5' 6\" (1.676 m) 08/01/2004 96% 43.9 kg/m2       Blood Pressure from Last 3 Encounters:   06/10/18 127/67   05/29/18 105/66   10/03/17 112/75    Weight from Last 3 Encounters:   06/10/18 272 lb (123.4 kg)   05/29/18 274 lb 8 oz (124.5 kg)   10/03/17 263 lb (119.3 kg)               Primary Care Provider Office Phone # Fax #    Corina MILY Lara [phone] [address] [facility] [address]        Equal Access to Services     [facility] UCLAESTEFANIA AH: Hadii aad ku hadasho Soomaali, waaxda luqadaha, qaybta kaalmada adeegyada, waxay leticia giles . So [facility] [phone].    ATENCIÓN: Si habla español, tiene a clayton disposición servicios gratuitos de asistencia lingüística. Llame al [phone].    We comply with applicable federal civil rights laws and [place] laws. We do not discriminate on the basis of race, color, national origin, age, disability, sex, sexual orientation, or gender identity.            Thank you!     Thank you for choosing [facility]  for your care. Our goal is always to " provide you with excellent care. Hearing back from our patients is one way we can continue to improve our services. Please take a few minutes to complete the written survey that you may receive in the mail after your visit with us. Thank you!             Your Updated Medication List - Protect others around you: Learn how to safely use, store and throw away your medicines at www.disposemymeds.org.          This list is accurate as of 6/10/18  4:27 PM.  Always use your most recent med list.                   Brand Name Dispense Instructions for use Diagnosis    aspirin 81 MG EC tablet      Take 1 tablet (81 mg) by mouth daily    Aftercare following knee joint replacement surgery, unspecified laterality       B-12 100-5000 MCG Subl     50 tablet    Place 1 tablet under the tongue daily as needed    Aftercare following knee joint replacement surgery, unspecified laterality       calcium carbonate 500 MG chewable tablet    TUMS     Take 1-2 chew tab by mouth daily as needed for heartburn        Calcium-Phosphorus-Vitamin D 250-100-500 MG-MG-UNIT Chew    CALCIUM GUMMIES     Take 2 tablets by mouth daily as needed    Preop general physical exam       diphenhydrAMINE 25 MG capsule    BENADRYL    56 capsule    Take 1 capsule (25 mg) by mouth nightly as needed for sleep    Aftercare following knee joint replacement surgery, unspecified laterality       fluticasone 50 MCG/ACT spray    FLONASE    16 g    USE 2 SPRAYS IN EACH NOSTRIL DAILY    Chronic rhinitis, unspecified type       levothyroxine 100 MCG tablet    SYNTHROID/LEVOTHROID    30 tablet    Take 1 tablet (100 mcg) by mouth daily    Hypothyroidism, unspecified type       lisinopril-hydrochlorothiazide 20-12.5 MG per tablet    PRINZIDE/ZESTORETIC    90 tablet    Take 1 tablet by mouth daily    Essential hypertension with goal blood pressure less than 140/90       MAGNESIUM PO      Take by mouth daily Magnesium 7        multivitamin, therapeutic with minerals Tabs tablet      30 each    Take 1 tablet by mouth daily    Preop general physical exam       simvastatin 20 MG tablet    ZOCOR    90 tablet    Take 1 tablet (20 mg) by mouth At Bedtime    Hyperlipidemia LDL goal <130

## 2018-06-10 NOTE — PROGRESS NOTES
SUBJECTIVE:  Rhnoda Trotter is a 67 year old female who presents to the clinic today with a chief complaint of cough  for 3 day(s). Has been wheezing prior to the cough, but has just recently worsened.   Her cough is described as persistent.    The patient's symptoms are moderate and stable.  Associated symptoms include rhinorrhea. The patient's symptoms are exacerbated by no particular triggers  Patient has been using OTC cough suppressants  to improve symptoms.    Past Medical History:   Diagnosis Date     Arthritis      Obesity, unspecified      Pure hypercholesterolemia      Unspecified essential hypertension      Unspecified hypothyroidism        Current Outpatient Prescriptions   Medication Sig Dispense Refill     aspirin EC 81 MG tablet Take 1 tablet (81 mg) by mouth daily       calcium carbonate (TUMS) 500 MG chewable tablet Take 1-2 chew tab by mouth daily as needed for heartburn       Calcium-Phosphorus-Vitamin D (CALCIUM GUMMIES) 250-100-500 MG-MG-UNIT CHEW Take 2 tablets by mouth daily as needed       Cobalamine Combinations (B-12) 100-5000 MCG SUBL Place 1 tablet under the tongue daily as needed 50 tablet 0     diphenhydrAMINE (BENADRYL) 25 MG capsule Take 1 capsule (25 mg) by mouth nightly as needed for sleep 56 capsule      fluticasone (FLONASE) 50 MCG/ACT spray USE 2 SPRAYS IN EACH NOSTRIL DAILY 16 g PRN     levothyroxine (SYNTHROID/LEVOTHROID) 100 MCG tablet Take 1 tablet (100 mcg) by mouth daily 30 tablet 0     lisinopril-hydrochlorothiazide (PRINZIDE/ZESTORETIC) 20-12.5 MG per tablet Take 1 tablet by mouth daily 90 tablet PRN     MAGNESIUM PO Take by mouth daily Magnesium 7       multivitamin, therapeutic with minerals (THERA-VIT-M) TABS Take 1 tablet by mouth daily 30 each 0     simvastatin (ZOCOR) 20 MG tablet Take 1 tablet (20 mg) by mouth At Bedtime 90 tablet PRN       Social History   Substance Use Topics     Smoking status: Never Smoker     Smokeless tobacco: Never Used     Alcohol use  "Yes      Comment: very light use       ROS  10 Review of systems negative except as stated above.    OBJECTIVE:  /67  Pulse 78  Temp 97.4  F (36.3  C) (Tympanic)  Ht 5' 6\" (1.676 m)  Wt 272 lb (123.4 kg)  LMP 08/01/2004  SpO2 96%  BMI 43.9 kg/m2  GENERAL APPEARANCE: healthy, alert and no distress  EYES: EOMI,  PERRL, conjunctiva clear  HENT: ear canals and TM's normal.  Nose and mouth without ulcers, erythema or lesions  NECK: supple, nontender, no lymphadenopathy  RESP: Inspiratory and expiratory wheezes in lung fields  CV: regular rates and rhythm, normal S1 S2, no murmur noted  NEURO: Normal strength and tone, sensory exam grossly normal,  normal speech and mentation  SKIN: no suspicious lesions or rashes    CXR -- clear  ASSESSMENT / PLAN:  1. Cough        Push fluids and rest. Honey for cough, humidified air at night.   Mucinex  RED FLAG symptoms discussed  - XR Chest 2 Views; Future  - albuterol (PROAIR HFA/PROVENTIL HFA/VENTOLIN HFA) 108 (90 Base) MCG/ACT Inhaler; Inhale 2 puffs into the lungs every 6 hours as needed for shortness of breath / dyspnea or wheezing  Dispense: 1 Inhaler; Refill: 0  - benzonatate (TESSALON) 200 MG capsule; Take 1 capsule (200 mg) by mouth 3 times daily as needed for cough  Dispense: 21 capsule; Refill: 0    2. Acute bronchospasm  - albuterol (PROAIR HFA/PROVENTIL HFA/VENTOLIN HFA) 108 (90 Base) MCG/ACT Inhaler; Inhale 2 puffs into the lungs every 6 hours as needed for shortness of breath / dyspnea or wheezing  Dispense: 1 Inhaler; Refill: 0  - benzonatate (TESSALON) 200 MG capsule; Take 1 capsule (200 mg) by mouth 3 times daily as needed for cough  Dispense: 21 capsule; Refill: 0      Steph Paz PA-C    "

## 2018-06-10 NOTE — PATIENT INSTRUCTIONS
* VIRAL RESPIRATORY ILLNESS w/ Wheezing [Adult]  You have an Upper Respiratory Illness (URI) caused by a virus. This illness is contagious during the first few days. It is spread through the air by coughing and sneezing or by direct contact (touching the sick person and then touching your own eyes, nose or mouth). When the infection causes a lot of irritation, the air passages can go into spasm. This causes wheezing and shortness of breath.    Most viral illnesses resolve within 7-10 days with rest and simple home remedies, although the illness may sometimes last for several weeks. Antibiotics will not kill a virus and are generally not prescribed for this condition.  HOME CARE:      If symptoms are severe, rest at home for the first 2-3 days. When resuming activity, don't let yourself become overly tired.    Avoid exposure to cigarette smoke (yours or others).    You may use acetaminophen (Tylenol) 650-1000 mg every 6 hours or ibuprofen (Motrin, Advil) 600 mg every 6-8 hours with food to control pain, if you are able to take these medicines. [ NOTE : If you have chronic liver or kidney disease or ever had a stomach ulcer or GI bleeding, talk with your doctor before using these medicines.] (Aspirin should never be used in anyone under 18 years of age who is ill with a fever. It may cause severe liver damage.    Your appetite may be poor so a light diet is fine. Avoid dehydration by drinking 6-8 glasses of fluids per day (water, soft drinks, juices, tea, soup, etc.). Extra fluids will help loosen secretions in the nose and lungs.    Over-the-counter cold medicines will not shorten the duration of the illness, but may be helpful for the following symptoms: cough (Robitussin DM); sore throat (Chloraseptic lozenges or spray); nasal and sinus congestion (Actifed or Sudafed). [NOTE: Do not use decongestants if you have high blood pressure.]  FOLLOW UP with your doctor or as advised if you are not improving over the  next week.  GET PROMPT MEDICAL ATTENTION if any of the following occur:    Cough with lots of colored sputum (mucus) or blood in your sputum    Chest pain, shortness of breath, wheezing or difficulty breathing    Severe headache; face, neck or ear pain    Fever over 101 F (38.3 C) for more than three days    Unable to swallow due to throat pain    8710-6088 [employer]. [address] [place] [address]. All rights reserved. This information is not intended as a substitute for professional medical care. Always follow your healthcare professional's instructions.  This information has been modified by your health care provider with permission from the publisher.

## 2018-06-19 ENCOUNTER — OFFICE VISIT (OUTPATIENT)
Dept: FAMILY MEDICINE | Facility: CLINIC | Age: 68
End: 2018-06-19
Payer: MEDICARE

## 2018-06-19 VITALS
DIASTOLIC BLOOD PRESSURE: 53 MMHG | BODY MASS INDEX: 44.47 KG/M2 | OXYGEN SATURATION: 97 % | WEIGHT: 275.5 LBS | RESPIRATION RATE: 18 BRPM | SYSTOLIC BLOOD PRESSURE: 116 MMHG | TEMPERATURE: 97.6 F | HEART RATE: 76 BPM

## 2018-06-19 DIAGNOSIS — N39.3 STRESS INCONTINENCE: ICD-10-CM

## 2018-06-19 DIAGNOSIS — J20.8 VIRAL BRONCHITIS: ICD-10-CM

## 2018-06-19 DIAGNOSIS — R35.0 URINARY FREQUENCY: ICD-10-CM

## 2018-06-19 DIAGNOSIS — E87.1 HYPONATREMIA: ICD-10-CM

## 2018-06-19 DIAGNOSIS — R82.90 NONSPECIFIC FINDING ON EXAMINATION OF URINE: ICD-10-CM

## 2018-06-19 DIAGNOSIS — R31.9 URINARY TRACT INFECTION WITH HEMATURIA, SITE UNSPECIFIED: Primary | ICD-10-CM

## 2018-06-19 DIAGNOSIS — N39.0 URINARY TRACT INFECTION WITH HEMATURIA, SITE UNSPECIFIED: Primary | ICD-10-CM

## 2018-06-19 LAB
ALBUMIN UR-MCNC: 30 MG/DL
ANION GAP SERPL CALCULATED.3IONS-SCNC: 10 MMOL/L (ref 3–14)
APPEARANCE UR: CLEAR
BACTERIA #/AREA URNS HPF: ABNORMAL /HPF
BILIRUB UR QL STRIP: NEGATIVE
BUN SERPL-MCNC: 12 MG/DL (ref 7–30)
CALCIUM SERPL-MCNC: 9.1 MG/DL (ref 8.5–10.1)
CHLORIDE SERPL-SCNC: 97 MMOL/L (ref 94–109)
CO2 SERPL-SCNC: 26 MMOL/L (ref 20–32)
COLOR UR AUTO: YELLOW
CREAT SERPL-MCNC: 0.79 MG/DL (ref 0.52–1.04)
GFR SERPL CREATININE-BSD FRML MDRD: 73 ML/MIN/1.7M2
GLUCOSE SERPL-MCNC: 105 MG/DL (ref 70–99)
GLUCOSE UR STRIP-MCNC: NEGATIVE MG/DL
HGB UR QL STRIP: ABNORMAL
KETONES UR STRIP-MCNC: NEGATIVE MG/DL
LEUKOCYTE ESTERASE UR QL STRIP: ABNORMAL
NITRATE UR QL: NEGATIVE
NON-SQ EPI CELLS #/AREA URNS LPF: ABNORMAL /LPF
PH UR STRIP: 6 PH (ref 5–7)
POTASSIUM SERPL-SCNC: 4.4 MMOL/L (ref 3.4–5.3)
RBC #/AREA URNS AUTO: ABNORMAL /HPF
SODIUM SERPL-SCNC: 133 MMOL/L (ref 133–144)
SOURCE: ABNORMAL
SP GR UR STRIP: 1.01 (ref 1–1.03)
UROBILINOGEN UR STRIP-ACNC: 0.2 EU/DL (ref 0.2–1)
WBC #/AREA URNS AUTO: >100 /HPF

## 2018-06-19 PROCEDURE — 80048 BASIC METABOLIC PNL TOTAL CA: CPT | Performed by: NURSE PRACTITIONER

## 2018-06-19 PROCEDURE — 87186 SC STD MICRODIL/AGAR DIL: CPT | Performed by: FAMILY MEDICINE

## 2018-06-19 PROCEDURE — 87088 URINE BACTERIA CULTURE: CPT | Performed by: FAMILY MEDICINE

## 2018-06-19 PROCEDURE — 99214 OFFICE O/P EST MOD 30 MIN: CPT | Performed by: FAMILY MEDICINE

## 2018-06-19 PROCEDURE — 81001 URINALYSIS AUTO W/SCOPE: CPT | Performed by: FAMILY MEDICINE

## 2018-06-19 PROCEDURE — 87086 URINE CULTURE/COLONY COUNT: CPT | Performed by: FAMILY MEDICINE

## 2018-06-19 PROCEDURE — 36415 COLL VENOUS BLD VENIPUNCTURE: CPT | Performed by: NURSE PRACTITIONER

## 2018-06-19 RX ORDER — CIPROFLOXACIN 500 MG/1
500 TABLET, FILM COATED ORAL 2 TIMES DAILY
Qty: 14 TABLET | Refills: 0 | Status: SHIPPED | OUTPATIENT
Start: 2018-06-19 | End: 2018-11-01

## 2018-06-19 NOTE — MR AVS SNAPSHOT
After Visit Summary   6/19/2018    Rhonda Trotter    MRN: [id number]           Patient Information     Date Of Birth          1950        Visit Information        Provider Department      6/19/2018 12:40 PM Kesha Elmore MD [facility] [place]        Today's Diagnoses     Urinary tract infection with hematuria, site unspecified    -  1    Urinary frequency        Nonspecific finding on examination of urine          Care Instructions    urine suggestive of infection  Change protection frequently  Push fluids  Eat a probiotic  cipro twice a day with food 7 days   Can cause tendonitis and tendon rupture              Follow-ups after your visit        Your next 10 appointments already scheduled     Jun 28, 2018 11:30 AM CDT   (Arrive by 11:15 AM)   HERNESTO For Women Only with Gifty Buckner PT   [facility] [place] ([facility])    [address] [place] [address] [phone]              Who to contact     If you have questions or need follow up information about today's clinic visit or your schedule please contact [facility] directly at [phone].  Normal or non-critical lab and imaging results will be communicated to you by "[employer]hart, letter or phone within 4 business days after the clinic has received the results. If you do not hear from us within 7 days, please contact the clinic through "[employer]hart or phone. If you have a critical or abnormal lab result, we will notify you by phone as soon as possible.  Submit refill requests through [employer] or call your pharmacy and they will forward the refill request to us. Please allow 3 business days for your refill to be completed.          Additional Information About Your Visit        MyChart Information     [employer] gives you secure access to your electronic health record. If you see a primary care provider, you can also send messages to your care team and make appointments. If you have questions,  please call your primary care clinic.  If you do not have a primary care provider, please call [phone] and they will assist you.        Care EveryWhere ID     This is your Care EveryWhere ID. This could be used by other organizations to access your [facility] medical records  [id number]        Your Vitals Were     Pulse Temperature Respirations Last Period Pulse Oximetry BMI (Body Mass Index)    76 97.6  F (36.4  C) (Tympanic) 18 08/01/2004 97% 44.47 kg/m2       Blood Pressure from Last 3 Encounters:   06/19/18 116/53   06/10/18 127/67   05/29/18 105/66    Weight from Last 3 Encounters:   06/19/18 275 lb 8 oz (125 kg)   06/10/18 272 lb (123.4 kg)   05/29/18 274 lb 8 oz (124.5 kg)              We Performed the Following     UA reflex to Microscopic and Culture     Urine Culture Aerobic Bacterial     Urine Microscopic          Today's Medication Changes          These changes are accurate as of 6/19/18  1:46 PM.  If you have any questions, ask your nurse or doctor.               Start taking these medicines.        Dose/Directions    ciprofloxacin 500 MG tablet   Commonly known as:  CIPRO   Used for:  Urinary tract infection with hematuria, site unspecified   Started by:  Kesha Elmore MD        Dose:  500 mg   Take 1 tablet (500 mg) by mouth 2 times daily   Quantity:  14 tablet   Refills:  0            Where to get your medicines      These medications were sent to [id number] - [place], [place] - [address] AT [facility] [address]  [address], [place] [address]     Phone:  [phone]     ciprofloxacin 500 MG tablet                Primary Care Provider Office Phone # Fax #    Corina Lara [phone] [address] [facility] [address]        Equal Access to Services     LAWANDA PEÑALOZA : Hadii agus Patterson, waaxda luqadaha, qaybta kaalmada tyler, evie crocker. So [facility] [phone].    ATENCIÓN: Julien bloom,  tiene a clayton disposición servicios gratuitos de asistencia lingüística. López huynh [phone].    We comply with applicable federal civil rights laws and [place] laws. We do not discriminate on the basis of race, color, national origin, age, disability, sex, sexual orientation, or gender identity.            Thank you!     Thank you for choosing [facility]  for your care. Our goal is always to provide you with excellent care. Hearing back from our patients is one way we can continue to improve our services. Please take a few minutes to complete the written survey that you may receive in the mail after your visit with us. Thank you!             Your Updated Medication List - Protect others around you: Learn how to safely use, store and throw away your medicines at www.disposemymeds.org.          This list is accurate as of 6/19/18  1:46 PM.  Always use your most recent med list.                   Brand Name Dispense Instructions for use Diagnosis    albuterol 108 (90 Base) MCG/ACT Inhaler    PROAIR HFA/PROVENTIL HFA/VENTOLIN HFA    1 Inhaler    Inhale 2 puffs into the lungs every 6 hours as needed for shortness of breath / dyspnea or wheezing    Cough, Acute bronchospasm       aspirin 81 MG EC tablet      Take 1 tablet (81 mg) by mouth daily    Aftercare following knee joint replacement surgery, unspecified laterality       B-12 100-5000 MCG Subl     50 tablet    Place 1 tablet under the tongue daily as needed    Aftercare following knee joint replacement surgery, unspecified laterality       benzonatate 200 MG capsule    TESSALON    21 capsule    Take 1 capsule (200 mg) by mouth 3 times daily as needed for cough    Acute bronchospasm, Cough       calcium carbonate 500 MG chewable tablet    TUMS     Take 1-2 chew tab by mouth daily as needed for heartburn        Calcium-Phosphorus-Vitamin D 250-100-500 MG-MG-UNIT Chew    CALCIUM GUMMIES     Take 2 tablets by mouth daily as needed    Preop general  physical exam       ciprofloxacin 500 MG tablet    CIPRO    14 tablet    Take 1 tablet (500 mg) by mouth 2 times daily    Urinary tract infection with hematuria, site unspecified       diphenhydrAMINE 25 MG capsule    BENADRYL    56 capsule    Take 1 capsule (25 mg) by mouth nightly as needed for sleep    Aftercare following knee joint replacement surgery, unspecified laterality       fluticasone 50 MCG/ACT spray    FLONASE    16 g    USE 2 SPRAYS IN EACH NOSTRIL DAILY    Chronic rhinitis, unspecified type       levothyroxine 100 MCG tablet    SYNTHROID/LEVOTHROID    30 tablet    Take 1 tablet (100 mcg) by mouth daily    Hypothyroidism, unspecified type       lisinopril-hydrochlorothiazide 20-12.5 MG per tablet    PRINZIDE/ZESTORETIC    90 tablet    Take 1 tablet by mouth daily    Essential hypertension with goal blood pressure less than 140/90       MAGNESIUM PO      Take by mouth daily Magnesium 7        multivitamin, therapeutic with minerals Tabs tablet     30 each    Take 1 tablet by mouth daily    Preop general physical exam       simvastatin 20 MG tablet    ZOCOR    90 tablet    Take 1 tablet (20 mg) by mouth At Bedtime    Hyperlipidemia LDL goal <130

## 2018-06-19 NOTE — PROGRESS NOTES
SUBJECTIVE:   Rhonda Trotter is a 67 year old female who presents to clinic today for the following health issues:      URINARY TRACT SYMPTOMS      Duration:  2 days    Description  Frequency and urgency  yesterday worse, hard time sleeping, now better  Initial urine sample dropped in toilet so waiting to get another  Has been Coughing last 2 weeks from viral bronchitis and has had stress incontinence, so using pads/depends   Feels now has not been changing them frequent enough, & sitting in a wet diaper. And that has caused a UTI.   Usually even after taking one dose of Cipro has helped in the past. Hope tests show she can get that today.   Has had both knees replaced & feels since had a bladder catheter for the procedures it has triggered her to get recurrent infections since then. Usually when happens can tell cause and effect. Noted while sitting on the toilet has felt occasional surge like sensation in her core, last felt last nigh not now.   Was in urgent care week ago Sunday , given script for inhaler &  tessalon and Flonase, feels cough is getting better.     Intensity:  moderate    Accompanying signs and symptoms:   Fever/chills: no   Flank pain no   Nausea and vomiting: no   Vaginal symptoms: none  Abdominal/Pelvic Pain: no     History  History of frequent UTI's: hx of UTI only after surgery   History of kidney stones: no   Sexually Active: no   Possibility of pregnancy: No    Precipitating or alleviating factors: pt state that she have been feeling sick a couple days ago URI, intake a lot water during that week. State using depends and believe that she didn't change enough and that might of cause UTI.     Therapies tried and outcome: none   Outcome: none    No fever or chills, no headache or dizziness, no double or blurry vision, no facial pain, earache, sore throat, some runny nose, no post nasal drip, no trouble hearing, smelling, tasting or swallowing, cough but no chest pain, trouble breathing  or palpitations, No abdominal pain, heart burn, reflux, nausea or vomiting or diarrhea or constipation, no blood in stools or black stools, no weight loss or night sweats. No dysuria, hematuria, hesitancy No pelvic complaints. No leg swelling or joint pain. No rash.    Hypertension Follow-up      Outpatient blood pressures are not being checked.    Low Salt Diet: no added salt    BMI 40, hyperlipidemia on simvastatin, hypothyroidism on levothyroxine, hypertension and aspirin and lisinopril hydrochlorothiazide, history of knee pain status post right total knee arthroplasty on the left in 2016 and on the right in October 2016, Quevedo's neuroma, osteopenia, osteoporosis, postop anemia resolved, physical deconditioning resolved, history of squamous cell cancer in situ, prior wisdom teeth extraction, negative breast biopsy in 2014, colonoscopy in 2013, ORIF left wrist in the past seen 5/29/2018 by PCP Chikis Lara for osteoporosis DEXA scan ordered hypertension was stable glucose noted normal for chronic rhinitis renewed Flonase lab work was ordered for med monitoring and sodium came back at 130's to recheck today results are pending. Seen 6/10/2018 for acute bronchitis that had been going on 2 weeks, chest x-ray was normal was given albuterol and Tessalon.  [facility] shows Percocet # 15 given 6/26/2017.     New to me. here for above.    Problem list and histories reviewed & adjusted, as indicated.  Additional history: as documented    Patient Active Problem List   Diagnosis     Hypothyroidism     HYPERLIPIDEMIA LDL GOAL <130     Squamous cell carcinoma in situ     Quevedo's neuroma     Osteopenia     Degenerative arthritis of knee     Aftercare following knee joint replacement surgery, unspecified laterality     Anemia due to blood loss, acute     Advance care planning     Left knee pain     Other orthopedic aftercare(V54.89)     BMI of 40.0-44.9, adult (H)     Physical deconditioning     Benign essential hypertension      Right knee pain     Aftercare following right knee joint replacement surgery     Osteoporosis, unspecified osteoporosis type, unspecified pathological fracture presence     Past Surgical History:   Procedure Laterality Date     ARTHROPLASTY KNEE Left 2/3/2016    Procedure: ARTHROPLASTY KNEE;  Surgeon: Abdiel Ledesma MD;  Location: [facility] OR     ARTHROPLASTY KNEE Right 10/11/2016    Procedure: ARTHROPLASTY KNEE;  Surgeon: Abdiel Ledesma MD;  Location: [facility] OR     BIOPSY  2014    breast biopsy was negative     COLONOSCOPY      every 5 years starting at age 52     orif Left     wrist     SURGICAL HISTORY OF -       wisdom teeth extraction       Social History   Substance Use Topics     Smoking status: Never Smoker     Smokeless tobacco: Never Used     Alcohol use Yes      Comment: very light use     Family History   Problem Relation Age of Onset     C.A.D. Father      MI's x 2     Hypertension Father      Hyperlipidemia Father      Prostate Cancer Father      Cancer - colorectal Mother      diagnosed age 55     HEART DISEASE Mother      mi     Colon Cancer Mother      Obesity Mother      Circulatory Sister      aortic stenosis; Raynauds     Lipids Sister      on Lipitor     Respiratory Sister      pulmonary fibrosis     Hypertension Sister      lost wt and off bp meds     C.A.D. Brother       of MI     HEART DISEASE Brother 53     mi     Hypertension Brother      Respiratory Paternal Uncle      Tb     HEART DISEASE Paternal Uncle      Gynecology Daughter      Hypertension Sister      Hyperlipidemia Sister      Obesity Sister      Hypertension Brother      Diabetes No family hx of      Cerebrovascular Disease No family hx of      Breast Cancer No family hx of          Current Outpatient Prescriptions   Medication Sig Dispense Refill     albuterol (PROAIR HFA/PROVENTIL HFA/VENTOLIN HFA) 108 (90 Base) MCG/ACT Inhaler Inhale 2 puffs into the lungs every 6 hours as needed for shortness of breath /  dyspnea or wheezing 1 Inhaler 0     aspirin EC 81 MG tablet Take 1 tablet (81 mg) by mouth daily       benzonatate (TESSALON) 200 MG capsule Take 1 capsule (200 mg) by mouth 3 times daily as needed for cough 21 capsule 0     calcium carbonate (TUMS) 500 MG chewable tablet Take 1-2 chew tab by mouth daily as needed for heartburn       Calcium-Phosphorus-Vitamin D (CALCIUM GUMMIES) 250-100-500 MG-MG-UNIT CHEW Take 2 tablets by mouth daily as needed       Cobalamine Combinations (B-12) 100-5000 MCG SUBL Place 1 tablet under the tongue daily as needed 50 tablet 0     diphenhydrAMINE (BENADRYL) 25 MG capsule Take 1 capsule (25 mg) by mouth nightly as needed for sleep 56 capsule      fluticasone (FLONASE) 50 MCG/ACT spray USE 2 SPRAYS IN EACH NOSTRIL DAILY 16 G PRN     levothyroxine (SYNTHROID/LEVOTHROID) 100 MCG tablet Take 1 tablet (100 mcg) by mouth daily 30 tablet 0     lisinopril-hydrochlorothiazide (PRINZIDE/ZESTORETIC) 20-12.5 MG per tablet Take 1 tablet by mouth daily 90 tablet PRN     MAGNESIUM PO Take by mouth daily Magnesium 7       multivitamin, therapeutic with minerals (THERA-VIT-M) TABS Take 1 tablet by mouth daily 30 each 0     simvastatin (ZOCOR) 20 MG tablet Take 1 tablet (20 mg) by mouth At Bedtime 90 tablet PRN     Allergies   Allergen Reactions     No Known Drug Allergies      Recent Labs   Lab Test  06/19/18   0904  05/29/18   1450  10/03/17   1044   09/15/16   1038   09/14/15   1050   05/24/12   0804  05/06/11   0915   A1C   --    --   5.3   --    --    --    --    --    --    --    LDL   --    --   79   --   110*   --   109   < >  108  108   HDL   --    --   90   --   66   --   86   < >  91  74   TRIG   --    --   130   --   189*   --   113   < >  80  127   ALT   --    --    --    --    --    --   31   --   11  17   CR  0.79  0.72   --    < >  0.75   < >  0.78   < >  0.79  0.75   GFRESTIMATED  73  80   --    < >  78   < >  74   < >  74  79   GFRESTBLACK  88  >90   --    < >  >90    GFR Calc     < >  90   < >  >90  >90   POTASSIUM  4.4  3.8   --    < >  4.0   < >  4.0   < >  4.2  4.1   TSH   --    --   2.46   --   2.62   --   2.26   < >  3.26  2.16    < > = values in this interval not displayed.      BP Readings from Last 3 Encounters:   06/19/18 116/53   06/10/18 127/67   05/29/18 105/66    Wt Readings from Last 3 Encounters:   06/19/18 275 lb 8 oz (125 kg)   06/10/18 272 lb (123.4 kg)   05/29/18 274 lb 8 oz (124.5 kg)                  Labs reviewed in EPIC    Reviewed and updated as needed this visit by clinical staff  Tobacco  Allergies  Meds  Problems  Med Hx  Surg Hx  Fam Hx  Soc Hx        Reviewed and updated as needed this visit by Provider  Tobacco  Allergies  Meds  Problems  Med Hx  Surg Hx  Fam Hx  Soc Hx          ROS:  Constitutional, HEENT, cardiovascular, pulmonary, GI, , musculoskeletal, neuro, skin, endocrine and psych systems are negative, except as otherwise noted.    OBJECTIVE:     /53 (BP Location: Right arm, Patient Position: Chair, Cuff Size: Adult Large)  Pulse 76  Temp 97.6  F (36.4  C) (Tympanic)  Resp 18  Wt 275 lb 8 oz (125 kg)  LMP 08/01/2004  SpO2 97%  BMI 44.47 kg/m2  Body mass index is 44.47 kg/(m^2).  GENERAL: healthy, alert, no distress and obese  EYES: Eyes grossly normal to inspection, PERRL and conjunctivae and sclerae normal  HENT: ear canals and TM's normal, nose and mouth without ulcers or lesions  NECK: no adenopathy, no asymmetry, masses, or scars and thyroid normal to palpation  RESP: lungs clear to auscultation - no rales, rhonchi or wheezes, tight sounding cough at times  CV: regular rate and rhythm, normal S1 S2, no S3 or S4, no murmur, click or rub, no peripheral edema and peripheral pulses strong  ABDOMEN: soft, non tender, no hepatosplenomegaly, no masses and bowel sounds normal, no CVA tenderness  MS: no gross musculoskeletal defects noted, no edema  SKIN: no suspicious lesions or rashes  NEURO: Normal strength and  tone, mentation intact and speech normal  PSYCH: mentation appears normal, affect normal/bright, anxious, judgement and insight intact and appearance well groomed    Diagnostic Test Results:  No results found for this or any previous visit (from the past 24 hour(s)).   UA suggestive of UTI, set up for culture    ASSESSMENT/PLAN:     1. Urinary tract infection with hematuria, site unspecified  Urine suggestive of infection. Desires Cipro which she has taken in the past. Cipro twice a day with food for 7 days. Eat a probiotic while on it. Counseled it can cause tendonitis and tendon rupture but still desires to take it. Avoid heavy lifting while on the med. UTI likely triggered form use of stress incontinence products & kept on too long. Counseled to change protection frequently. Push fluids.will see what culture show sand call if antibiotic need to be changed. Has had e coli UTI sensitive to Cipro in the past. And go to the ER if gets worse.  - ciprofloxacin (CIPRO) 500 MG tablet; Take 1 tablet (500 mg) by mouth 2 times daily  Dispense: 14 tablet; Refill: 0    2. Urinary frequency  - UA reflex to Microscopic and Culture  - Urine Microscopic    3. Nonspecific finding on examination of urine  - Urine Culture Aerobic Bacterial    4. Stress incontinence  Worse since recent bronchitis and cough and use of depends for it has triggered infection. Advised to change products frequently and use inhaler and cough med to help manage cough    5. Viral bronchitis  Improving with symptomatic care.       See Patient Instructions    Kehsa Elmore MD  [facility]

## 2018-06-19 NOTE — PATIENT INSTRUCTIONS
urine suggestive of infection  Change protection frequently  Push fluids  Eat a probiotic  cipro twice a day with food 7 days   Can cause tendonitis and tendon rupture

## 2018-06-21 LAB
BACTERIA SPEC CULT: ABNORMAL
BACTERIA SPEC CULT: ABNORMAL
SPECIMEN SOURCE: ABNORMAL

## 2018-06-21 NOTE — PROGRESS NOTES
Nancy Ms. Trotter,  Your results came back showing E coli UTI sensitive to cipro given. Also sensitive to a whole lot of other antibiotics that have less side effects so in the future may be better to try other antibiotics as cipro can cause tendonitis. If you have any further concerns please do not hesitate to contact us by message, phone or making an appointment.  Have a good day   Dr Ramírez ALCAZAR

## 2018-07-09 ENCOUNTER — MYC MEDICAL ADVICE (OUTPATIENT)
Dept: FAMILY MEDICINE | Facility: CLINIC | Age: 68
End: 2018-07-09

## 2018-07-09 RX ORDER — AMOXICILLIN 500 MG/1
2000 CAPSULE ORAL ONCE
Qty: 4 CAPSULE | Refills: 1 | Status: SHIPPED | OUTPATIENT
Start: 2018-07-09 | End: 2020-01-20

## 2018-07-19 ENCOUNTER — MYC MEDICAL ADVICE (OUTPATIENT)
Dept: FAMILY MEDICINE | Facility: CLINIC | Age: 68
End: 2018-07-19

## 2018-07-19 NOTE — TELEPHONE ENCOUNTER
The xray results basically mean that she has a viral bronchitis, no concerning findings.  Since her symptoms are improving, she doesn't need to follow up.

## 2018-07-19 NOTE — TELEPHONE ENCOUNTER
Erica,    On 6/10/18 pt was seen in UC for cough and CXR done then. Can you interpret the CXR further for the pt?    Thanks,    Merle Ureña, RN, BSN

## 2018-07-25 ENCOUNTER — THERAPY VISIT (OUTPATIENT)
Dept: PHYSICAL THERAPY | Facility: CLINIC | Age: 68
End: 2018-07-25
Attending: NURSE PRACTITIONER
Payer: MEDICARE

## 2018-07-25 DIAGNOSIS — M81.0 OSTEOPOROSIS: Primary | ICD-10-CM

## 2018-07-25 PROCEDURE — G8981 BODY POS CURRENT STATUS: HCPCS | Mod: GP | Performed by: PHYSICAL THERAPIST

## 2018-07-25 PROCEDURE — 97110 THERAPEUTIC EXERCISES: CPT | Mod: GP | Performed by: PHYSICAL THERAPIST

## 2018-07-25 PROCEDURE — G8982 BODY POS GOAL STATUS: HCPCS | Mod: GP | Performed by: PHYSICAL THERAPIST

## 2018-07-25 PROCEDURE — 97530 THERAPEUTIC ACTIVITIES: CPT | Mod: GP | Performed by: PHYSICAL THERAPIST

## 2018-07-25 PROCEDURE — 97161 PT EVAL LOW COMPLEX 20 MIN: CPT | Mod: GP | Performed by: PHYSICAL THERAPIST

## 2018-07-25 NOTE — LETTER
DEPARTMENT OF HEALTH AND HUMAN SERVICES  CENTERS FOR MEDICARE & MEDICAID SERVICES    PLAN/UPDATED PLAN OF PROGRESS FOR OUTPATIENT REHABILITATION    PATIENTS NAME:  Rhonda Trotter     : 1950    PROVIDER NUMBER:    [id number]    [facility]  [id number]     PROVIDER NAME: [facility] PRIYA    MEDICAL RECORD NUMBER: [id number]     START OF CARE DATE:  SOC Date: 18   TYPE:  PT    PRIMARY/TREATMENT DIAGNOSIS: (Pertinent Medical Diagnosis)  Osteoporosis    VISITS FROM START OF CARE:  Rxs Used: 1     [facility] Evaluation  Patient is a 67 year old female presenting with rehab composite hpi. The history is provided by the patient. No  was used.     Rhonda Trotter being seen for Osteoporosis and LBP.   Problem began 2018 (onset approx 1 yr ago; 3 falls in last year). Problem occurred: unknown if related to falls; no fracture noted xray.      Other medical history details: CC: postural strain/pain with standing while doing activity (cooking) or walking.     Other surgery history details: B knee replacements .                      Objective:  Standing Alignment:    Cervical/Thoracic:  Forward head, thoracic kyphosis increased and cervical lordosis decreased    Gait:    Gait Type:  Antalgic   Assistive Devices:  None  Deviations:  General Deviations:  Base of support incr, juliet decr and stride length decr      General Evaluation:  AROM:      Cervical/Thoracic:  Normal    Lumbopelvic:  Normal      Lower Extremity:  Significant findings:  Dec hip extension; knee flexion 110, ext lacks 5    Gross Strength:      Core:   Significant findings:  Poor recruitment    Palpation:    Significant findings:  Generalized LB paraspinal tenderness not isolated to vertebra  Sensation:  normal    PATIENTS NAME:  Rhonda Trotter   : 1950    Balance:    Single Leg Stance--Eyes Open:  Left: 5/30 sec    Right: 5/30 sec    Osteoporosis:  Dexa scan T  score Hip:  -2.3 right -1.7 left    Dexa scan T score Spine:  -2.6        Assessment/Plan:    Patient is a 67 year old female with lumbar complaints.    Patient has the following significant findings with corresponding treatment plan.                Diagnosis 1:  Osteoporosis  Decreased function - therapeutic activities  Impaired posture - neuro re-education  Diagnosis 2:  LBP   Pain -  manual therapy, self management, education and home program  Decreased strength - therapeutic exercise and therapeutic activities  Impaired gait - gait training  Impaired muscle performance - neuro re-education  Decreased function - therapeutic activities  Impaired posture - neuro re-education    Therapy Evaluation Codes:   1) History comprised of:   Personal factors that impact the plan of care:      Age.    Comorbidity factors that impact the plan of care are:      Menopausal.     Medications impacting care: High blood pressure.  2) Examination of Body Systems comprised of:   Body structures and functions that impact the plan of care:      Lumbar spine.   Activity limitations that impact the plan of care are:      Standing and Walking.  3) Clinical presentation characteristics are:   Stable/Uncomplicated.  4) Decision-Making    Low complexity using standardized patient assessment instrument and/or measureable assessment of functional outcome.  Cumulative Therapy Evaluation is: Low complexity.    Previous and current functional limitations:  (See Goal Flow Sheet for this information)    Short term and Long term goals: (See Goal Flow Sheet for this information)     Communication ability:  Patient appears to be able to clearly communicate and understand verbal and written communication and follow directions correctly.  Treatment Explanation - The following has been discussed with the patient:   RX ordered/plan of care  Anticipated outcomes  Possible risks and side effects  This patient would benefit from PT intervention to resume  "normal activities.   Rehab potential is good.    Frequency:  1 X week, once daily  Duration:  for 12 weeks  Discharge Plan:  Achieve all LTG.  Independent in home treatment program.  Reach maximal therapeutic benefit.    PATIENTS NAME:  Rhonda Trotter   : 1950    Please refer to the daily flowsheet for treatment today, total treatment time and time spent performing 1:1 timed codes.         Caregiver Signature/Credentials _____________________________ Date ________       Treating Provider: Gifty Buckner, PT   I have reviewed and certified the need for these services and plan of treatment while under my care.        PHYSICIAN'S SIGNATURE:   _____________________________________  Date___________     Corina Lara NP    Certification period:  Beginning of Cert date period: 18 to End of Cert period date: 10/22/18     Functional Level Progress Report: Please see attached \"Goal Flow sheet for Functional level.\"    ____X____ Continue Services or       ________ DC Services                Service dates: From  SOC Date: 18 date to present                         "

## 2018-07-26 NOTE — PROGRESS NOTES
Patient is a 67 year old female presenting with rehab composite [facility]. The history is provided by the patient. No  was used.   [facility] Evaluation  Rhonda Trotter being seen for Osteoporosis and LBP.   Problem began 5/29/2018 (onset approx 1 yr ago; 3 falls in last year). Problem occurred: unknown if related to falls; no fracture noted xray.      Other medical history details: CC: postural strain/pain with standing while doing activity (cooking) or walking.     Other surgery history details: B knee replacements 2016.                                          Objective:  Standing Alignment:    Cervical/Thoracic:  Forward head, thoracic kyphosis increased and cervical lordosis decreased                Gait:    Gait Type:  Antalgic   Assistive Devices:  None  Deviations:  General Deviations:  Base of support incr, juliet decr and stride length decr          Physical Exam        General Evaluation:  AROM:      Cervical/Thoracic:  Normal    Lumbopelvic:  Normal      Lower Extremity:  Significant findings:  Dec hip extension; knee flexion 110, ext lacks 5    Gross Strength:                Core:   Significant findings:  Poor recruitment      Palpation:    Significant findings:  Generalized LB paraspinal tenderness not isolated to vertebra  Sensation:  normal            Balance:    Single Leg Stance--Eyes Open:  Left: 5/30 sec    Right: 5/30 sec                Osteoporosis:  Dexa scan T score Hip:  -2.3 right -1.7 left    Dexa scan T score Spine:  -2.6                                                           ROS    Assessment/Plan:    Patient is a 67 year old female with lumbar complaints.    Patient has the following significant findings with corresponding treatment plan.                Diagnosis 1:  Osteoporosis  Decreased function - therapeutic activities  Impaired posture - neuro re-education  Diagnosis 2:  LBP   Pain -  manual therapy, self management, education and home  program  Decreased strength - therapeutic exercise and therapeutic activities  Impaired gait - gait training  Impaired muscle performance - neuro re-education  Decreased function - therapeutic activities  Impaired posture - neuro re-education    Therapy Evaluation Codes:   1) History comprised of:   Personal factors that impact the plan of care:      Age.    Comorbidity factors that impact the plan of care are:      Menopausal.     Medications impacting care: High blood pressure.  2) Examination of Body Systems comprised of:   Body structures and functions that impact the plan of care:      Lumbar spine.   Activity limitations that impact the plan of care are:      Standing and Walking.  3) Clinical presentation characteristics are:   Stable/Uncomplicated.  4) Decision-Making    Low complexity using standardized patient assessment instrument and/or measureable assessment of functional outcome.  Cumulative Therapy Evaluation is: Low complexity.    Previous and current functional limitations:  (See Goal Flow Sheet for this information)    Short term and Long term goals: (See Goal Flow Sheet for this information)     Communication ability:  Patient appears to be able to clearly communicate and understand verbal and written communication and follow directions correctly.  Treatment Explanation - The following has been discussed with the patient:   RX ordered/plan of care  Anticipated outcomes  Possible risks and side effects  This patient would benefit from PT intervention to resume normal activities.   Rehab potential is good.    Frequency:  1 X week, once daily  Duration:  for 12 weeks  Discharge Plan:  Achieve all LTG.  Independent in home treatment program.  Reach maximal therapeutic benefit.    Please refer to the daily flowsheet for treatment today, total treatment time and time spent performing 1:1 timed codes.

## 2018-08-09 ENCOUNTER — THERAPY VISIT (OUTPATIENT)
Dept: PHYSICAL THERAPY | Facility: CLINIC | Age: 68
End: 2018-08-09
Payer: MEDICARE

## 2018-08-09 DIAGNOSIS — M81.0 OSTEOPOROSIS: ICD-10-CM

## 2018-08-09 PROCEDURE — 97530 THERAPEUTIC ACTIVITIES: CPT | Mod: GP | Performed by: PHYSICAL THERAPIST

## 2018-08-09 PROCEDURE — 97112 NEUROMUSCULAR REEDUCATION: CPT | Mod: GP | Performed by: PHYSICAL THERAPIST

## 2018-08-09 PROCEDURE — 97110 THERAPEUTIC EXERCISES: CPT | Mod: GP | Performed by: PHYSICAL THERAPIST

## 2018-08-13 ENCOUNTER — TELEPHONE (OUTPATIENT)
Dept: FAMILY MEDICINE | Facility: CLINIC | Age: 68
End: 2018-08-13

## 2018-08-13 DIAGNOSIS — R42 DIZZINESS: Primary | ICD-10-CM

## 2018-08-13 NOTE — TELEPHONE ENCOUNTER
----- Message from Gifty Buckner, PT sent at 8/9/2018  9:44 PM CDT -----  Regarding: Patient referral  Hi Corina,    I am seeing Rhonda for her back pain/osteoporosis, and she has c/o dizziness/BPPV like symptoms during our treatments.    I recommended that she seek a [facility] for Balance/BPPV testing.    If you are in agreement, would you put through a referral for her?     If you have any questions, or I can do anything to help, please let me know.    Thank you,    Opal Buckner, PT  [place]

## 2018-08-21 ENCOUNTER — HOSPITAL ENCOUNTER (OUTPATIENT)
Dept: PHYSICAL THERAPY | Facility: CLINIC | Age: 68
Setting detail: THERAPIES SERIES
End: 2018-08-21
Attending: NURSE PRACTITIONER
Payer: MEDICARE

## 2018-08-21 PROCEDURE — 97161 PT EVAL LOW COMPLEX 20 MIN: CPT | Mod: GP | Performed by: PHYSICAL THERAPIST

## 2018-08-21 PROCEDURE — 40000840 ZZHC STATISTIC PT VESTIBULAR VISIT: Performed by: PHYSICAL THERAPIST

## 2018-08-21 PROCEDURE — G8982 BODY POS GOAL STATUS: HCPCS | Mod: GP,CJ | Performed by: PHYSICAL THERAPIST

## 2018-08-21 PROCEDURE — G8981 BODY POS CURRENT STATUS: HCPCS | Mod: GP,CJ | Performed by: PHYSICAL THERAPIST

## 2018-08-21 PROCEDURE — G8983 BODY POS D/C STATUS: HCPCS | Mod: GP,CJ | Performed by: PHYSICAL THERAPIST

## 2018-08-21 NOTE — PROGRESS NOTES
08/21/18 0800   Quick Adds   Type of Visit Initial OP PT Evaluation   General Information   Start of Care Date 08/21/18   Referring Physician . Corina Lara NP   Orders Evaluate and Treat as Indicated   Order Date 08/13/18   Medical Diagnosis Dizziness   Onset of illness/injury or Date of Surgery 08/13/18   Surgical/Medical history reviewed Yes   Pertinent history of current problem (include personal factors and/or comorbidities that impact the POC) B TKA in 2016 doing 1 knee at a time.  Notieced some balance problems afterwards and thought it was due to knees. Then had an episode a year ago with rolling over in bed and room spins. Lasted intensely for about a month but then also noticed it from time to time after that. Has had a few falls in the last few years.. Has been another PT recently after dx of osteoporosis an onset of LBP after stepping down and not realizing change in surface which jolted her back. Has ongoing sessions with this PT to work on strengthening and balance.    Prior level of function comment Walks 3 times a week.    Patient role/Employment history Retired   [facility] environment [facility]   Home/Community Accessibility Comments Lives alone   Patient/Family Goals Statement figure out cause of dizziness   Fall Risk Screen   Have you fallen 2 or more times in the past year? No   Have you fallen and had an injury in the past year? Yes   Timed Up and Go score (seconds) 8.94   Fall screen comments Has had intermittant falls.    Functional Scales   Functional Scales and Outcomes DHI 2   Pain   Pain comments also being treated by a PT fo LBP    Cognitive Status Examination   Level of Consciousness alert   Follows Commands and Answers Questions 100% of the time   Range of Motion (ROM)   ROM Comment [facility] for testing.    Bed Mobility   Bed Mobility Comments independent   Transfer Skills   Transfer Comments independent    Gait   Gait Comments No gross deficits with gait noted.    Gait Special  Tests   Gait Special Tests DYNAMIC GAIT INDEX   Gait Special Tests Dynamic Gait Index   Score out of 24 21   Comments did trip while stepping over 1 box the first time but able to do without touching the box on 2nd attempt   Oculomotor Exam   Smooth Pursuit Normal   Saccades Abnormal   Saccades Comments catch up saccade noted.    VOR Normal   Rapid Head Thrust Other   Rapid Head Thrust Comments mild decreased speed and corrective saccade to both sides.    Infrared Goggle Exam or Frenzel Lense Exam   Vestibular Suppressant in Last 24 Hours? No   Exam completed with Infrared Goggles   Spontaneous Nystagmus Negative   Gaze Evoked Nystagmus Negative   Head Shake Horizontal Nystagmus Downbeating   Positional Testing comments No complaints of dizziness or vertigo with any of the canal testing.    Humphrey-Hallpike (right) Horizontal L   Avalon-Hallpike (right) comments no torsion, faster on this side   Avalon-Hallpike (Left) Horizontal L   Humphrey-Hallpike (left) comments no torsion   HSCC Supine Roll Test (Right) Horizontal L   HSCC Supine Roll Test (Right) Comments lasted for > 1 min, no torsion, no complaints, stronger on this side   HSCC Supine Roll Test (Left) Horizontal R   [facility]CC Supine Roll Test (Left) Comments  lasted for > 1 min, no torsion, no complaints.    BPPV Canal(s) L Horizontal   BPPV Type Cupulolithasis   Clinical Impression   Criteria for Skilled Therapeutic Interventions Met no;evaluation only   PT Diagnosis Dizziness   Influenced by the following impairments dizziness, mild balance changes   Functional limitations due to impairments none at this time. Does have hx of intermittant falls.    Clinical Presentation Stable/Uncomplicated   Clinical Presentation Rationale dizziness, DGI   Clinical Decision Making (Complexity) Low complexity   Predicted Duration of Therapy Intervention (days/wks) evaluation only    Risk & Benefits of therapy have been explained Yes   Patient, Family & other staff in agreement with plan of  care Yes   Clinical Impression Comments Client was NOT dizzy or c/o vertigo with any positional testing. Appeared to have some apogeotropic nystagmus with horizontal canal testing. This indicates that it could be peripheral or central in nature.  However since she was not symptomatic did not feel further treatment was needed at this time. Subjective symptoms appear to correlate with BPPV but was not bothering her today. May benefit from further testing in the future or if BPPV c/o vertigo and dizziness reoccur may benefti from an additional evaluation.    GOALS   PT Eval Goals 1   Goal 1   Goal Identifier eval   Goal Description Client will be educated in results of therapist's evaluation and if there might be need for any further follow up in the future.    Target Date 08/21/18   Date Met 08/21/18   Total Evaluation Time   Total Evaluation Time (Minutes) 35

## 2018-08-29 ENCOUNTER — THERAPY VISIT (OUTPATIENT)
Dept: PHYSICAL THERAPY | Facility: CLINIC | Age: 68
End: 2018-08-29
Payer: MEDICARE

## 2018-08-29 DIAGNOSIS — M81.0 OSTEOPOROSIS: ICD-10-CM

## 2018-08-29 PROCEDURE — 97112 NEUROMUSCULAR REEDUCATION: CPT | Mod: GP | Performed by: PHYSICAL THERAPIST

## 2018-08-29 PROCEDURE — 97110 THERAPEUTIC EXERCISES: CPT | Mod: GP | Performed by: PHYSICAL THERAPIST

## 2018-09-19 ENCOUNTER — THERAPY VISIT (OUTPATIENT)
Dept: PHYSICAL THERAPY | Facility: CLINIC | Age: 68
End: 2018-09-19
Payer: MEDICARE

## 2018-09-19 DIAGNOSIS — M81.0 OSTEOPOROSIS: ICD-10-CM

## 2018-09-19 PROCEDURE — 97110 THERAPEUTIC EXERCISES: CPT | Mod: GP | Performed by: PHYSICAL THERAPIST

## 2018-09-19 PROCEDURE — 97112 NEUROMUSCULAR REEDUCATION: CPT | Mod: GP | Performed by: PHYSICAL THERAPIST

## 2018-09-20 NOTE — PROGRESS NOTES
Subjective:  HPI  Oswestry Score: 6.67 %                 Objective:  System    Physical Exam    General     ROS    Assessment/Plan:    PROGRESS  REPORT    Progress reporting period is from 7/27/2018 to 9/19/2018.       SUBJECTIVE  Subjective changes noted by patient:  yes.  Subjective: No falls since last visit; dizziness seems improved but not sure why.  Having a difficult time being consistent with exercises.  RIb pain gone.    Current pain level is 0/10 Current Pain level: 0/10.     Previous pain level was  4/10  .   Changes in function:  Yes (See Goal flowsheet attached for changes in current functional level)  Adverse reaction to treatment or activity: None    OBJECTIVE  Changes noted in objective findings:  Yes  Objective: Barriers to consitent exercise include lack of social support, varying schedule, demotivation due to not seeing immediate results.  Todays treatment consited of review of exercise program, then problem solving potential areas to help patient acheive outcomes and self manage, as well as fall prevention and fracture prevention.  Tolerance for standing increased; walking distance for osteoporosis management increasing, while dynamic balance still presents fall risk     ASSESSMENT/PLAN  Updated problem list and treatment plan: Diagnosis 1:  Osteoporosis  Impaired balance - neuro re-education and therapeutic activities  Decreased function - therapeutic activities  Diagnosis 2:  LBP     STG/LTGs have been met or progress has been made towards goals:  Yes (See Goal flow sheet completed today.)  Assessment of Progress: Patient is meeting short term goals and is progressing towards long term goals.  Self Management Plans:  Patient has been instructed in a home treatment program.  I have re-evaluated this patient and find that the nature, scope, duration and intensity of the therapy is appropriate for the medical condition of the patient.  Rhonda continues to require the following intervention to  meet STG and LTG's:  PT    Recommendations:  This patient would benefit from continued therapy.     Frequency:  1 X week, once daily  Duration:  for every 2 weeks weeks tapering to 1 X a month over 8 weeks        Please refer to the daily flowsheet for treatment today, total treatment time and time spent performing 1:1 timed codes.

## 2018-10-10 ENCOUNTER — THERAPY VISIT (OUTPATIENT)
Dept: PHYSICAL THERAPY | Facility: CLINIC | Age: 68
End: 2018-10-10
Payer: MEDICARE

## 2018-10-10 DIAGNOSIS — M81.0 OSTEOPOROSIS: ICD-10-CM

## 2018-10-10 PROCEDURE — 97530 THERAPEUTIC ACTIVITIES: CPT | Mod: GP | Performed by: PHYSICAL THERAPIST

## 2018-10-10 PROCEDURE — G8982 BODY POS GOAL STATUS: HCPCS | Mod: GP | Performed by: PHYSICAL THERAPIST

## 2018-10-10 PROCEDURE — G8981 BODY POS CURRENT STATUS: HCPCS | Mod: GP | Performed by: PHYSICAL THERAPIST

## 2018-10-10 PROCEDURE — 97110 THERAPEUTIC EXERCISES: CPT | Mod: GP | Performed by: PHYSICAL THERAPIST

## 2018-10-31 ASSESSMENT — ENCOUNTER SYMPTOMS
DIZZINESS: 0
COUGH: 0
FREQUENCY: 0
NAUSEA: 0
DYSURIA: 0
HEMATOCHEZIA: 0
CONSTIPATION: 0
BREAST MASS: 0
ARTHRALGIAS: 0
ABDOMINAL PAIN: 0
PALPITATIONS: 0
CHILLS: 0
DIARRHEA: 0
HEARTBURN: 1
WEAKNESS: 0
NERVOUS/ANXIOUS: 0
FEVER: 0
JOINT SWELLING: 0
EYE PAIN: 0
HEADACHES: 0
PARESTHESIAS: 0
HEMATURIA: 0
SHORTNESS OF BREATH: 0
SORE THROAT: 0
MYALGIAS: 0

## 2018-10-31 ASSESSMENT — ACTIVITIES OF DAILY LIVING (ADL): CURRENT_FUNCTION: NO ASSISTANCE NEEDED

## 2018-11-01 ENCOUNTER — OFFICE VISIT (OUTPATIENT)
Dept: FAMILY MEDICINE | Facility: CLINIC | Age: 68
End: 2018-11-01
Payer: MEDICARE

## 2018-11-01 ENCOUNTER — RADIANT APPOINTMENT (OUTPATIENT)
Dept: GENERAL RADIOLOGY | Facility: CLINIC | Age: 68
End: 2018-11-01
Attending: NURSE PRACTITIONER
Payer: MEDICARE

## 2018-11-01 VITALS
BODY MASS INDEX: 44.36 KG/M2 | WEIGHT: 276 LBS | OXYGEN SATURATION: 95 % | HEART RATE: 78 BPM | TEMPERATURE: 97.7 F | RESPIRATION RATE: 18 BRPM | SYSTOLIC BLOOD PRESSURE: 124 MMHG | HEIGHT: 66 IN | DIASTOLIC BLOOD PRESSURE: 73 MMHG

## 2018-11-01 DIAGNOSIS — I10 ESSENTIAL HYPERTENSION WITH GOAL BLOOD PRESSURE LESS THAN 140/90: ICD-10-CM

## 2018-11-01 DIAGNOSIS — E03.9 HYPOTHYROIDISM, UNSPECIFIED TYPE: ICD-10-CM

## 2018-11-01 DIAGNOSIS — M81.0 OSTEOPOROSIS, UNSPECIFIED OSTEOPOROSIS TYPE, UNSPECIFIED PATHOLOGICAL FRACTURE PRESENCE: ICD-10-CM

## 2018-11-01 DIAGNOSIS — E78.5 HYPERLIPIDEMIA LDL GOAL <130: ICD-10-CM

## 2018-11-01 DIAGNOSIS — M54.6 BILATERAL THORACIC BACK PAIN, UNSPECIFIED CHRONICITY: ICD-10-CM

## 2018-11-01 DIAGNOSIS — S22.000A CLOSED COMPRESSION FRACTURE OF THORACIC VERTEBRA, INITIAL ENCOUNTER (H): ICD-10-CM

## 2018-11-01 DIAGNOSIS — Z00.00 ENCOUNTER FOR ROUTINE ADULT HEALTH EXAMINATION WITHOUT ABNORMAL FINDINGS: Primary | ICD-10-CM

## 2018-11-01 DIAGNOSIS — E66.01 MORBID OBESITY WITH BMI OF 40.0-44.9, ADULT (H): ICD-10-CM

## 2018-11-01 LAB
ANION GAP SERPL CALCULATED.3IONS-SCNC: 10 MMOL/L (ref 3–14)
BUN SERPL-MCNC: 13 MG/DL (ref 7–30)
CALCIUM SERPL-MCNC: 9.6 MG/DL (ref 8.5–10.1)
CHLORIDE SERPL-SCNC: 95 MMOL/L (ref 94–109)
CHOLEST SERPL-MCNC: 166 MG/DL
CO2 SERPL-SCNC: 25 MMOL/L (ref 20–32)
CREAT SERPL-MCNC: 0.75 MG/DL (ref 0.52–1.04)
GFR SERPL CREATININE-BSD FRML MDRD: 77 ML/MIN/1.7M2
GLUCOSE SERPL-MCNC: 87 MG/DL (ref 70–99)
HDLC SERPL-MCNC: 77 MG/DL
LDLC SERPL CALC-MCNC: 62 MG/DL
NONHDLC SERPL-MCNC: 89 MG/DL
POTASSIUM SERPL-SCNC: 4.1 MMOL/L (ref 3.4–5.3)
SODIUM SERPL-SCNC: 130 MMOL/L (ref 133–144)
TRIGL SERPL-MCNC: 134 MG/DL
TSH SERPL DL<=0.005 MIU/L-ACNC: 3.1 MU/L (ref 0.4–4)

## 2018-11-01 PROCEDURE — 80061 LIPID PANEL: CPT | Performed by: NURSE PRACTITIONER

## 2018-11-01 PROCEDURE — G0438 PPPS, INITIAL VISIT: HCPCS | Performed by: NURSE PRACTITIONER

## 2018-11-01 PROCEDURE — 80048 BASIC METABOLIC PNL TOTAL CA: CPT | Performed by: NURSE PRACTITIONER

## 2018-11-01 PROCEDURE — 36415 COLL VENOUS BLD VENIPUNCTURE: CPT | Performed by: NURSE PRACTITIONER

## 2018-11-01 PROCEDURE — 84443 ASSAY THYROID STIM HORMONE: CPT | Performed by: NURSE PRACTITIONER

## 2018-11-01 PROCEDURE — 72072 X-RAY EXAM THORAC SPINE 3VWS: CPT

## 2018-11-01 PROCEDURE — 99213 OFFICE O/P EST LOW 20 MIN: CPT | Mod: 25 | Performed by: NURSE PRACTITIONER

## 2018-11-01 RX ORDER — LEVOTHYROXINE SODIUM 100 UG/1
100 TABLET ORAL DAILY
Qty: 90 TABLET | Status: SHIPPED | OUTPATIENT
Start: 2018-11-01 | End: 2019-11-13

## 2018-11-01 RX ORDER — OXYCODONE HYDROCHLORIDE 5 MG/1
5 TABLET ORAL EVERY 6 HOURS PRN
Qty: 15 TABLET | Refills: 0 | Status: SHIPPED | OUTPATIENT
Start: 2018-11-01 | End: 2019-11-13

## 2018-11-01 RX ORDER — ALENDRONATE SODIUM 70 MG/1
TABLET ORAL
Qty: 12 TABLET | Status: SHIPPED | OUTPATIENT
Start: 2018-11-01 | End: 2019-11-13

## 2018-11-01 RX ORDER — LISINOPRIL AND HYDROCHLOROTHIAZIDE 12.5; 2 MG/1; MG/1
1 TABLET ORAL DAILY
Qty: 90 TABLET | Status: SHIPPED | OUTPATIENT
Start: 2018-11-01 | End: 2019-11-13

## 2018-11-01 RX ORDER — SIMVASTATIN 20 MG
20 TABLET ORAL AT BEDTIME
Qty: 90 TABLET | Status: SHIPPED | OUTPATIENT
Start: 2018-11-01 | End: 2019-11-11

## 2018-11-01 ASSESSMENT — ENCOUNTER SYMPTOMS
SORE THROAT: 0
ABDOMINAL PAIN: 0
BREAST MASS: 0
JOINT SWELLING: 0
PALPITATIONS: 0
NERVOUS/ANXIOUS: 0
ARTHRALGIAS: 0
CHILLS: 0
HEADACHES: 0
COUGH: 0
MYALGIAS: 0
PARESTHESIAS: 0
HEMATOCHEZIA: 0
FEVER: 0
DIZZINESS: 0
DIARRHEA: 0
DYSURIA: 0
NAUSEA: 0
BACK PAIN: 1
HEMATURIA: 0
FREQUENCY: 0
EYE PAIN: 0
WEAKNESS: 0
HEARTBURN: 1
SHORTNESS OF BREATH: 0
CONSTIPATION: 0

## 2018-11-01 ASSESSMENT — ACTIVITIES OF DAILY LIVING (ADL): CURRENT_FUNCTION: NO ASSISTANCE NEEDED

## 2018-11-01 NOTE — LETTER
November 1, 2018      Rhonda Trotter  [address] [address]        To Whom It May Concern,      Rhonda Trotter is unable to participate in an exercise program for medical reasons and I have recommended that she cancel her group training agreement/contract.             Sincerely,        Corina Lara, NP

## 2018-11-01 NOTE — LETTER
November 1, 2018      Rhonda Trotter  [address] [address]        To Whom It May Concern,      Rhonda Trotter is unable to participate in an exercise program for medical reasons and I have recommended that she cancel her gym membership.           Sincerely,        Corina Lara, DNP

## 2018-11-01 NOTE — PROGRESS NOTES
"SUBJECTIVE:   Rhonda Trotter is a 68 year old female who presents for Preventive Visit.    Are you in the first 12 months of your Medicare coverage?  No    Annual Wellness Visit     In general, how would you rate your overall health?  Good    Frequency of exercise:  2-3 days/week    Duration of exercise:  15-30 minutes    Do you usually eat at least 4 servings of fruit and vegetables a day, include whole grains    & fiber and avoid regularly eating high fat or \"junk\" foods?  No    Taking medications regularly:  Yes    Medication side effects:  None    Ability to successfully perform activities of daily living:  No assistance needed    Home Safety:  No safety concerns identified    Hearing Impairment:  No hearing concerns    In the past 6 months, have you been bothered by leaking of urine? Yes    In general, how would you rate your overall mental or emotional health?  Good    PHQ-2 Total Score: 0    Additional concerns today:  Yes        Fall risk:  Fallen 2 or more times in the past year?: Yes (Once in August 2018. On treadmill in January foot caught. She has had balance issues and has been seen at a balance clinic for this. )  Any fall with injury in the past year?: No  Timed Up and Go Test (>13.5 is fall risk; contact physician) : 12      When she fell in August she twisted her back. Has been seen by chiropractor who relieved rib pain/injury.   Saw PT after bone density scan. She hasn't been doing balance exercises recently. Has attended a few group training session at the . \"Walking doesn't seem to bother me too much\"    Back Pain    Using heat and ice for mid back pain that has been present for 2 weeks since hearing and feeling a pop in her back at the gym.   Now having some pain laterally.   She has been taking Tylenol and ibuprofen, which helps some, but pain is worse at night.   She has been seeing a chiropractor.   She is requesting a Doctor's note to get out of class. She signed up for 6 " months.        COGNITIVE SCREEN  1) Repeat 3 items (Leader, Season, Table)    2) Clock draw: NORMAL  3) 3 item recall: Recalls 3 objects  Results: 3 items recalled: COGNITIVE IMPAIRMENT LESS LIKELY    Mini-CogTM Copyright S Mary. Licensed by the author for use in [facility]; reprinted with permission (hanny@[facility]). All rights reserved.        Reviewed and updated as needed this visit by clinical staff  Tobacco  Meds  Med Hx  Surg Hx  Fam Hx  Soc Hx        Reviewed and updated as needed this visit by Provider        Social History   Substance Use Topics     Smoking status: Never Smoker     Smokeless tobacco: Never Used     Alcohol use Yes      Comment: very light use       Alcohol Use 10/31/2018   If you drink alcohol do you typically have greater than 3 drinks per day OR greater than 7 drinks per week? No               Do you feel safe in your environment - Yes    Do you have a Health Care Directive?: Yes: Advance Directive has been received and scanned.    Current providers sharing in care for this patient include:   Patient Care Team:  Corina Lara NP as PCP - General    The following health maintenance items are reviewed in Epic and correct as of today:  Health Maintenance   Topic Date Due     TSH Q1 YEAR  10/03/2018     MAMMO SCREEN Q2 YR (SYSTEM ASSIGNED)  09/15/2018     BMP Q1 YR  06/19/2019     FALL RISK ASSESSMENT  06/19/2019     PHQ-2 Q1 YR  06/19/2019     PNEUMOCOCCAL (2 of 2 - PPSV23) 02/07/2021     ADVANCE DIRECTIVE PLANNING Q5 YRS  02/16/2021     TETANUS IMMUNIZATION (SYSTEM ASSIGNED)  05/06/2021     LIPID SCREEN Q5 YR FEMALE (SYSTEM ASSIGNED)  10/03/2022     COLONOSCOPY Q5 YR  05/03/2023     DEXA SCAN SCREENING (SYSTEM ASSIGNED)  Completed     INFLUENZA VACCINE  Completed     HEPATITIS C SCREENING  Completed             Review of Systems   Constitutional: Negative for chills and fever.   HENT: Negative for congestion, ear pain, hearing loss and sore throat.    Eyes: Negative  "for pain and visual disturbance.   Respiratory: Negative for cough and shortness of breath.    Cardiovascular: Negative for chest pain, palpitations and peripheral edema.   Gastrointestinal: Positive for heartburn. Negative for abdominal pain, constipation, diarrhea, hematochezia and nausea.   Breasts:  Negative for tenderness, breast mass and discharge.   Genitourinary: Negative for dysuria, frequency, genital sores, hematuria, pelvic pain, urgency, vaginal bleeding and vaginal discharge.   Musculoskeletal: Positive for back pain. Negative for arthralgias, joint swelling and myalgias.   Skin: Negative for rash.   Neurological: Negative for dizziness, weakness, headaches and paresthesias.   Psychiatric/Behavioral: Negative for mood changes. The patient is not nervous/anxious.          OBJECTIVE:   /73  Pulse 78  Temp 97.7  F (36.5  C) (Oral)  Resp 18  Ht 5' 5.5\" (1.664 m)  Wt 276 lb (125.2 kg)  LMP 08/01/2004  SpO2 95%  BMI 45.23 kg/m2 Estimated body mass index is 45.23 kg/(m^2) as calculated from the following:    Height as of this encounter: 5' 5.5\" (1.664 m).    Weight as of this encounter: 276 lb (125.2 kg).  Physical Exam  GENERAL: healthy, alert and no distress  EYES: Eyes grossly normal to inspection, PERRL and conjunctivae and sclerae normal  HENT: ear canals and TM's normal, nose and mouth without ulcers or lesions  NECK: no adenopathy, no asymmetry, masses, or scars and thyroid normal to palpation  RESP: lungs clear to auscultation - no rales, rhonchi or wheezes  BREAST: normal without masses, tenderness or nipple discharge and no palpable axillary masses or adenopathy  CV: regular rate and rhythm, normal S1 S2, no S3 or S4, no murmur, click or rub, no peripheral edema and peripheral pulses strong  ABDOMEN: soft, nontender, no hepatosplenomegaly, no masses and bowel sounds normal  MS: mild thoracic vertebral tenderness  SKIN: no suspicious lesions or rashes  NEURO: Normal strength and tone, " mentation intact and speech normal  PSYCH: mentation appears normal, affect normal/bright        ASSESSMENT / PLAN:   1. Encounter for routine adult health examination without abnormal findings      2. Closed compression fracture of thoracic vertebra, initial encounter (H)  Thoracic xray shows compression fracture.  Discussed symptomatic treatment measures - continue with Tylenol and Ibuprofen.  A small amount of oxycodone given if needed at HS.  Follow up if pain is not improving over the next few weeks.       3. BMI of 40.0-44.9, adult (H)  Discussed diet, weight loss.     4. Essential hypertension with goal blood pressure less than 140/90  At goal  The current medical regimen is effective;  continue present plan and medications.   - lisinopril-hydrochlorothiazide (PRINZIDE/ZESTORETIC) 20-12.5 MG per tablet; Take 1 tablet by mouth daily  Dispense: 90 tablet; Refill: PRN  - Basic metabolic panel    5. Hypothyroidism, unspecified type    - levothyroxine (SYNTHROID/LEVOTHROID) 100 MCG tablet; Take 1 tablet (100 mcg) by mouth daily  Dispense: 90 tablet; Refill: PRN  - TSH with free T4 reflex    6. Hyperlipidemia LDL goal <130    - simvastatin (ZOCOR) 20 MG tablet; Take 1 tablet (20 mg) by mouth At Bedtime  Dispense: 90 tablet; Refill: PRN  - Lipid panel reflex to direct LDL Fasting    7. Bilateral thoracic back pain, unspecified chronicity  See above.   - XR Thoracic Spine 3 Views; Future  - oxyCODONE IR (ROXICODONE) 5 MG tablet; Take 1 tablet (5 mg) by mouth every 6 hours as needed for severe pain  Dispense: 15 tablet; Refill: 0    8. Osteoporosis, unspecified osteoporosis type, unspecified pathological fracture presence  Start Fosamax.  Discussed the use and indication of this medication as well as potential side effects.   Recheck DEXA in one year.   - alendronate (FOSAMAX) 70 MG tablet; Take 1 tablet (70 mg) by mouth with 8oz water every 7 days 30 minutes before breakfast and remain upright during this time.   "Dispense: 12 tablet; Refill: PRN    End of Life Planning:  Patient currently has an advanced directive: Yes.  Practitioner is supportive of decision.    COUNSELING:  Reviewed preventive health counseling, as reflected in patient instructions    BP Readings from Last 1 Encounters:   11/01/18 124/73     Estimated body mass index is 45.23 kg/(m^2) as calculated from the following:    Height as of this encounter: 5' 5.5\" (1.664 m).    Weight as of this encounter: 276 lb (125.2 kg).      Weight management plan: Discussed healthy diet and exercise guidelines and patient will follow up in 12 months in clinic to re-evaluate.     reports that she has never smoked. She has never used smokeless tobacco.      Appropriate preventive services were discussed with this patient, including applicable screening as appropriate for cardiovascular disease, diabetes, osteopenia/osteoporosis, and glaucoma.  As appropriate for age/gender, discussed screening for colorectal cancer, prostate cancer, breast cancer, and cervical cancer. Checklist reviewing preventive services available has been given to the patient.    Reviewed patients plan of care and provided an AVS. The Basic Care Plan (routine screening as documented in Health Maintenance) for Rhonda meets the Care Plan requirement. This Care Plan has been established and reviewed with the Patient.    Counseling Resources:  ATP IV Guidelines  Pooled Cohorts Equation Calculator  Breast Cancer Risk Calculator  FRAX Risk Assessment  ICSI Preventive Guidelines  Dietary Guidelines for Americans, 2010  USDA's MyPlate  ASA Prophylaxis  Lung CA Screening    Corina Lara NP  [facility]  "

## 2018-11-01 NOTE — PATIENT INSTRUCTIONS

## 2018-11-01 NOTE — LETTER
November 1, 2018      Rhonda Trotter  [address] [address]        To Whom It May Concern,      Rhonda Trotter is unable to participate in an exercise program for medical reasons and I have recommended that she cancel her group training agreement/contract.           Sincerely,        Corina Lara, DNP

## 2018-11-01 NOTE — MR AVS SNAPSHOT
After Visit Summary   11/1/2018    Rhonda Trotter    MRN: [id number]           Patient Information     Date Of Birth          1950        Visit Information        Provider Department      11/1/2018 8:00 AM Corina Lara NP [facility]        Today's Diagnoses     Encounter for routine adult health examination without abnormal findings    -  1    Closed compression fracture of thoracic vertebra, initial encounter (H)        Essential hypertension with goal blood pressure less than 140/90        Hypothyroidism, unspecified type        Hyperlipidemia LDL goal <130        Bilateral thoracic back pain, unspecified chronicity        Osteoporosis, unspecified osteoporosis type, unspecified pathological fracture presence          Care Instructions      Preventive Health Recommendations    See your health care provider every year to    Review health changes.     Discuss preventive care.      Review your medicines if your doctor has prescribed any.      You no longer need a yearly Pap test unless you've had an abnormal Pap test in the past 10 years. If you have vaginal symptoms, such as bleeding or discharge, be sure to talk with your provider about a Pap test.      Every 1 to 2 years, have a mammogram.  If you are over 69, talk with your health care provider about whether or not you want to continue having screening mammograms.      Every 10 years, have a colonoscopy. Or, have a yearly FIT test (stool test). These exams will check for colon cancer.       Have a cholesterol test every 5 years, or more often if your doctor advises it.       Have a diabetes test (fasting glucose) every three years. If you are at risk for diabetes, you should have this test more often.       At age 65, have a bone density scan (DEXA) to check for osteoporosis (brittle bone disease).    Shots:    Get a flu shot each year.    Get a tetanus shot every 10 years.    Talk to your doctor about your  pneumonia vaccines. There are now two you should receive - Pneumovax (PPSV 23) and Prevnar (PCV 13).    Talk to your pharmacist about the shingles vaccine.    Talk to your doctor about the hepatitis B vaccine.    Nutrition:     Eat at least 5 servings of fruits and vegetables each day.      Eat whole-grain bread, whole-wheat pasta and brown rice instead of white grains and rice.      Get adequate Calcium and Vitamin D.     Lifestyle    Exercise at least 150 minutes a week (30 minutes a day, 5 days a week). This will help you control your weight and prevent disease.      Limit alcohol to one drink per day.      No smoking.       Wear sunscreen to prevent skin cancer.       See your dentist twice a year for an exam and cleaning.      See your eye doctor every 1 to 2 years to screen for conditions such as glaucoma, macular degeneration and cataracts.    Personalized Prevention Plan  You are due for the preventive services outlined below.  Your care team is available to assist you in scheduling these services.  If you have already completed any of these items, please share that information with your care team to update in your medical record.  Health Maintenance Due   Topic Date Due     Thyroid Function Lab (TSH) - yearly  10/03/2018     Mammogram - every 2 years  09/15/2018             Follow-ups after your visit        Your next 10 appointments already scheduled     Nov 15, 2018 11:00 AM CST   [facility] with Gifty Buckner PT   [facility] ([facility])    [address] [place] [id number] [phone]              Who to contact     If you have questions or need follow up information about today's clinic visit or your schedule please contact [facility] directly at [phone].  Normal or non-critical lab and imaging results will be communicated to you by MyChart, letter or phone within 4 business days after the clinic has received the results.  "If you do not hear from us within 7 days, please contact the clinic through [employer] or phone. If you have a critical or abnormal lab result, we will notify you by phone as soon as possible.  Submit refill requests through [employer] or call your pharmacy and they will forward the refill request to us. Please allow 3 business days for your refill to be completed.          Additional Information About Your Visit        [employer] Information     [employer] gives you secure access to your electronic health record. If you see a primary care provider, you can also send messages to your care team and make appointments. If you have questions, please call your primary care clinic.  If you do not have a primary care provider, please call [phone] and they will assist you.        Care EveryWhere ID     This is your Care EveryWhere ID. This could be used by other organizations to access your [facility] medical records  [id number]        Your Vitals Were     Pulse Temperature Respirations Height Last Period Pulse Oximetry    78 97.7  F (36.5  C) (Oral) 18 5' 5.5\" (1.664 m) 08/01/2004 95%    BMI (Body Mass Index)                   45.23 kg/m2            Blood Pressure from Last 3 Encounters:   11/01/18 124/73   06/19/18 116/53   06/10/18 127/67    Weight from Last 3 Encounters:   11/01/18 276 lb (125.2 kg)   06/19/18 275 lb 8 oz (125 kg)   06/10/18 272 lb (123.4 kg)              We Performed the Following     Basic metabolic panel     Lipid panel reflex to direct LDL Fasting     TSH with free T4 reflex          Today's Medication Changes          These changes are accurate as of 11/1/18 10:18 AM.  If you have any questions, ask your nurse or doctor.               Start taking these medicines.        Dose/Directions    alendronate 70 MG tablet   Commonly known as:  FOSAMAX   Used for:  Osteoporosis, unspecified osteoporosis type, unspecified pathological fracture presence   Started by:  Corina Lara NP        Take 1 tablet (70 " mg) by mouth with 8oz water every 7 days 30 minutes before breakfast and remain upright during this time.   Quantity:  12 tablet   Refills:  PRN       oxyCODONE IR 5 MG tablet   Commonly known as:  ROXICODONE   Used for:  Bilateral thoracic back pain, unspecified chronicity   Started by:  Corina Lara NP        Dose:  5 mg   Take 1 tablet (5 mg) by mouth every 6 hours as needed for severe pain   Quantity:  15 tablet   Refills:  0            Where to get your medicines      These medications were sent to [facility] - [address]  [address] [facility] [address]     Phone:  [phone]     alendronate 70 MG tablet    levothyroxine 100 MCG tablet    lisinopril-hydrochlorothiazide 20-12.5 MG per tablet    simvastatin 20 MG tablet         Some of these will need a paper prescription and others can be bought over the counter.  Ask your nurse if you have questions.     Bring a paper prescription for each of these medications     oxyCODONE IR 5 MG tablet               Information about OPIOIDS     PRESCRIPTION OPIOIDS: WHAT YOU NEED TO KNOW   We gave you an opioid (narcotic) pain medicine. It is important to manage your pain, but opioids are not always the best choice. You should first try all the other options your care team gave you. Take this medicine for as short a time (and as few doses) as possible.    Some activities can increase your pain, such as bandage changes or therapy sessions. It may help to take your pain medicine 30 to 60 minutes before these activities. Reduce your stress by getting enough sleep, working on hobbies you enjoy and practicing relaxation or meditation. Talk to your care team about ways to manage your pain beyond prescription opioids.    These medicines have risks:    DO NOT drive when on new or higher doses of pain medicine. These medicines can affect your alertness and reaction times, and you could be arrested for driving under  the influence (DUI). If you need to use opioids long-term, talk to your care team about driving.    DO NOT operate heavy machinery    DO NOT do any other dangerous activities while taking these medicines.    DO NOT drink any alcohol while taking these medicines.     If the opioid prescribed includes acetaminophen, DO NOT take with any other medicines that contain acetaminophen. Read all labels carefully. Look for the word  acetaminophen  or  Tylenol.  Ask your pharmacist if you have questions or are unsure.    You can get addicted to pain medicines, especially if you have a history of addiction (chemical, alcohol or substance dependence). Talk to your care team about ways to reduce this risk.    All opioids tend to cause constipation. Drink plenty of water and eat foods that have a lot of fiber, such as fruits, vegetables, prune juice, apple juice and high-fiber cereal. Take a laxative (Miralax, milk of magnesia, Colace, Senna) if you don t move your bowels at least every other day. Other side effects include upset stomach, sleepiness, dizziness, throwing up, tolerance (needing more of the medicine to have the same effect), physical dependence and slowed breathing.    Store your pills in a secure place, locked if possible. We will not replace any lost or stolen medicine. If you don t finish your medicine, please throw away (dispose) as directed by your pharmacist. The [place] Pollution Control Agency has more information about safe disposal: https://www.pca.state.[place]/living-green/managing-unwanted-medications         Primary Care Provider Office Phone # Fax #    Corina Lara [phone]       [address] [facility] [address]        Equal Access to Services     [facility]: Hadii aad ku hadasho Sobruno, waaxda luqadaha, qaybta kaalmaevie lopez. So [facility] [phone].    ATENCIÓN: Si habla español, tiene a clayton disposición servicios  mona de asistencia lingüística. López huynh [phone].    We comply with applicable federal civil rights laws and [place] laws. We do not discriminate on the basis of race, color, national origin, age, disability, sex, sexual orientation, or gender identity.            Thank you!     Thank you for choosing [facility]  for your care. Our goal is always to provide you with excellent care. Hearing back from our patients is one way we can continue to improve our services. Please take a few minutes to complete the written survey that you may receive in the mail after your visit with us. Thank you!             Your Updated Medication List - Protect others around you: Learn how to safely use, store and throw away your medicines at www.disposemymeds.org.          This list is accurate as of 11/1/18 10:18 AM.  Always use your most recent med list.                   Brand Name Dispense Instructions for use Diagnosis    alendronate 70 MG tablet    FOSAMAX    12 tablet    Take 1 tablet (70 mg) by mouth with 8oz water every 7 days 30 minutes before breakfast and remain upright during this time.    Osteoporosis, unspecified osteoporosis type, unspecified pathological fracture presence       aspirin 81 MG EC tablet      Take 1 tablet (81 mg) by mouth daily    Aftercare following knee joint replacement surgery, unspecified laterality       B-12 100-5000 MCG Subl     50 tablet    Place 1 tablet under the tongue daily as needed    Aftercare following knee joint replacement surgery, unspecified laterality       calcium carbonate 500 MG chewable tablet    TUMS     Take 1-2 chew tab by mouth daily as needed for heartburn        Calcium-Phosphorus-Vitamin D 250-100-500 MG-MG-UNIT Chew    CALCIUM GUMMIES     Take 2 tablets by mouth daily as needed    Preop general physical exam       diphenhydrAMINE 25 MG capsule    BENADRYL    56 capsule    Take 1 capsule (25 mg) by mouth nightly as needed for sleep     Aftercare following knee joint replacement surgery, unspecified laterality       fluticasone 50 MCG/ACT spray    FLONASE    16 g    USE 2 SPRAYS IN EACH NOSTRIL DAILY    Chronic rhinitis, unspecified type       levothyroxine 100 MCG tablet    SYNTHROID/LEVOTHROID    90 tablet    Take 1 tablet (100 mcg) by mouth daily    Hypothyroidism, unspecified type       lisinopril-hydrochlorothiazide 20-12.5 MG per tablet    PRINZIDE/ZESTORETIC    90 tablet    Take 1 tablet by mouth daily    Essential hypertension with goal blood pressure less than 140/90       MAGNESIUM PO      Take by mouth daily Magnesium 7        multivitamin, therapeutic with minerals Tabs tablet     30 each    Take 1 tablet by mouth daily    Preop general physical exam       oxyCODONE IR 5 MG tablet    ROXICODONE    15 tablet    Take 1 tablet (5 mg) by mouth every 6 hours as needed for severe pain    Bilateral thoracic back pain, unspecified chronicity       simvastatin 20 MG tablet    ZOCOR    90 tablet    Take 1 tablet (20 mg) by mouth At Bedtime    Hyperlipidemia LDL goal <130

## 2018-11-15 ENCOUNTER — THERAPY VISIT (OUTPATIENT)
Dept: PHYSICAL THERAPY | Facility: CLINIC | Age: 68
End: 2018-11-15
Payer: MEDICARE

## 2018-11-15 DIAGNOSIS — M81.0 OSTEOPOROSIS: ICD-10-CM

## 2018-11-15 PROCEDURE — 97110 THERAPEUTIC EXERCISES: CPT | Mod: GP | Performed by: PHYSICAL THERAPIST

## 2018-11-15 PROCEDURE — G8981 BODY POS CURRENT STATUS: HCPCS | Mod: GP | Performed by: PHYSICAL THERAPIST

## 2018-11-15 PROCEDURE — 97530 THERAPEUTIC ACTIVITIES: CPT | Mod: GP | Performed by: PHYSICAL THERAPIST

## 2018-11-15 PROCEDURE — G8982 BODY POS GOAL STATUS: HCPCS | Mod: GP | Performed by: PHYSICAL THERAPIST

## 2018-11-15 NOTE — LETTER
DEPARTMENT OF HEALTH AND HUMAN SERVICES  CENTERS FOR MEDICARE & MEDICAID SERVICES    PLAN/UPDATED PLAN OF PROGRESS FOR OUTPATIENT REHABILITATION    PATIENTS NAME:  Rhonda Trotter     : 1950    PROVIDER NUMBER:    [id number]    [id number]     PROVIDER NAME: [facility]    MEDICAL RECORD NUMBER: [id number]     START OF CARE DATE:  SOC Date: 18   TYPE:  PT    PRIMARY/TREATMENT DIAGNOSIS: (Pertinent Medical Diagnosis)  Osteoporosis    VISITS FROM START OF CARE:  Rxs Used: 6     PROGRESS  REPORT  Progress reporting period is from 18 to 2018.     SUBJECTIVE  Subjective: Right mid to lateral back pain after performing an exercise in a class.  Very  painful- went to chiro and it helped a little.  Xray showed T6 compression fracture- but comparison to pulmonary xray in  showed that the fracture was already there.   Current Pain level: 3/10       The objective findings are from DOS 2018.    OBJECTIVE  Objective: Generalized mid back soreness that improves with extension.  Suspect pain onset related to fracture, and patient fearful of return to gym.  Review of most recent Dexa with MD rec for pt to start osteoporosis medication (Fosamax).  Reviewed patients gym/clas program- potential flexion exercise/poor form +speed possibly impactful.  Rec for dev of weightlifting program in PT, w emphasis on extension biased exercises and progressive loading for bone health      ASSESSMENT/PLAN  Updated problem list and treatment plan: Diagnosis 1:  Osteoporosis  Pain -  manual therapy, self management, education, directional preference exercise and home program  Decreased strength - therapeutic exercise and therapeutic activities  Impaired balance - neuro re-education and therapeutic activities  Impaired gait - gait training  Impaired muscle performance - neuro re-education  Decreased function - therapeutic activities  Impaired posture - neuro re-education  STG/LTGs  "have been met or progress has been made towards goals:  Yes (See Goal flow sheet completed today.)  Assessment of Progress: The patient's condition has potential to improve.  The patient's condition has exacerbated.  Patient is meeting short term goals and is progressing towards long term goals.  Self Management Plans:  Patient has been instructed in a home treatment program.  PATIENTS NAME:  Rhonda Trotter   : 1950    Patient  has been instructed in self management of symptoms.  I have re-evaluated this patient and find that the nature, scope, duration and intensity of the therapy is appropriate for the medical condition of the patient.  Rhonda continues to require the following intervention to meet STG and LTG's: PT    Recommendations:  This patient would benefit from continued therapy.     Frequency:  1 X week, once daily  Duration:  for 8 weeks    Please refer to the daily flowsheet for treatment today, total treatment time and time spent performing 1:1 timed codes.        Caregiver Signature/Credentials _____________________________ Date ________       Treating Provider: Gifty Buckner, PT   I have reviewed and certified the need for these services and plan of treatment while under my care.        PHYSICIAN'S SIGNATURE:   _____________________________________  Date___________     Corina Lara NP    Certification period:  Beginning of Cert date period: 10/23/18 to End of Cert period date: 18     Functional Level Progress Report: Please see attached \"Goal Flow sheet for Functional level.\"    ____X____ Continue Services or       ________ DC Services                Service dates: From  SOC Date: 18 date to present                         "

## 2018-11-16 NOTE — PROGRESS NOTES
Subjective:  HPI                    Objective:  System    Physical Exam    General     ROS    Assessment/Plan:    PROGRESS  REPORT    Progress reporting period is from 9/20/18 to 11/14/2018.     SUBJECTIVE  Subjective: Right mid to lateral back pain after performing an exercise in a class.  Very  painful- went to chiro and it helped a little.  Xray showed T6 compression fracture- but comparison to pulmonary xray in June showed that the fracture was already there.   Current Pain level: 3/10            ;   ,     The objective findings are from DOS 11/14/2018.    OBJECTIVE  Objective: Generalized mid back soreness that improves with extension.  Suspect pain onset related to fracture, and patient fearful of return to gym.  Review of most recent Dexa with MD rec for pt to start osteoporosis medication (Fosamax).  Reviewed patients gym/clas program- potential flexion exercise/poor form +speed possibly impactful.  Rec for dev of weightlifting program in PT, w emphasis on extension biased exercises and progressive loading for bone health      ASSESSMENT/PLAN  Updated problem list and treatment plan: Diagnosis 1:  Osteoporosis  Pain -  manual therapy, self management, education, directional preference exercise and home program  Decreased strength - therapeutic exercise and therapeutic activities  Impaired balance - neuro re-education and therapeutic activities  Impaired gait - gait training  Impaired muscle performance - neuro re-education  Decreased function - therapeutic activities  Impaired posture - neuro re-education  STG/LTGs have been met or progress has been made towards goals:  Yes (See Goal flow sheet completed today.)  Assessment of Progress: The patient's condition has potential to improve.  The patient's condition has exacerbated.  Patient is meeting short term goals and is progressing towards long term goals.  Self Management Plans:  Patient has been instructed in a home treatment program.  Patient  has been  instructed in self management of symptoms.  I have re-evaluated this patient and find that the nature, scope, duration and intensity of the therapy is appropriate for the medical condition of the patient.  Rhonda continues to require the following intervention to meet STG and LTG's: PT      Recommendations:  This patient would benefit from continued therapy.     Frequency:  1 X week, once daily  Duration:  for 8 weeks        Please refer to the daily flowsheet for treatment today, total treatment time and time spent performing 1:1 timed codes.

## 2018-11-28 ENCOUNTER — THERAPY VISIT (OUTPATIENT)
Dept: PHYSICAL THERAPY | Facility: CLINIC | Age: 68
End: 2018-11-28
Payer: MEDICARE

## 2018-11-28 ENCOUNTER — HOSPITAL ENCOUNTER (OUTPATIENT)
Dept: MAMMOGRAPHY | Facility: CLINIC | Age: 68
Discharge: HOME | End: 2018-11-28
Attending: NURSE PRACTITIONER | Admitting: NURSE PRACTITIONER
Payer: MEDICARE

## 2018-11-28 DIAGNOSIS — M81.0 OSTEOPOROSIS: ICD-10-CM

## 2018-11-28 DIAGNOSIS — Z12.31 VISIT FOR SCREENING MAMMOGRAM: ICD-10-CM

## 2018-11-28 PROCEDURE — 97110 THERAPEUTIC EXERCISES: CPT | Mod: GP | Performed by: PHYSICAL THERAPIST

## 2018-11-28 PROCEDURE — 77063 BREAST TOMOSYNTHESIS BI: CPT

## 2018-12-03 ENCOUNTER — THERAPY VISIT (OUTPATIENT)
Dept: PHYSICAL THERAPY | Facility: CLINIC | Age: 68
End: 2018-12-03
Payer: MEDICARE

## 2018-12-03 DIAGNOSIS — M81.0 OSTEOPOROSIS: ICD-10-CM

## 2018-12-03 PROCEDURE — 97110 THERAPEUTIC EXERCISES: CPT | Mod: GP | Performed by: PHYSICAL THERAPIST

## 2018-12-03 PROCEDURE — 97530 THERAPEUTIC ACTIVITIES: CPT | Mod: GP | Performed by: PHYSICAL THERAPIST

## 2018-12-10 ENCOUNTER — THERAPY VISIT (OUTPATIENT)
Dept: PHYSICAL THERAPY | Facility: CLINIC | Age: 68
End: 2018-12-10
Payer: MEDICARE

## 2018-12-10 DIAGNOSIS — M81.0 OSTEOPOROSIS: ICD-10-CM

## 2018-12-10 PROCEDURE — 97110 THERAPEUTIC EXERCISES: CPT | Mod: GP | Performed by: PHYSICAL THERAPIST

## 2018-12-10 PROCEDURE — 97530 THERAPEUTIC ACTIVITIES: CPT | Mod: GP | Performed by: PHYSICAL THERAPIST

## 2018-12-17 ENCOUNTER — THERAPY VISIT (OUTPATIENT)
Dept: PHYSICAL THERAPY | Facility: CLINIC | Age: 68
End: 2018-12-17
Payer: MEDICARE

## 2018-12-17 DIAGNOSIS — M81.0 OSTEOPOROSIS: ICD-10-CM

## 2018-12-17 PROCEDURE — 97112 NEUROMUSCULAR REEDUCATION: CPT | Mod: GP | Performed by: PHYSICAL THERAPIST

## 2018-12-17 PROCEDURE — G8981 BODY POS CURRENT STATUS: HCPCS | Mod: GP | Performed by: PHYSICAL THERAPIST

## 2018-12-17 PROCEDURE — 97110 THERAPEUTIC EXERCISES: CPT | Mod: GP | Performed by: PHYSICAL THERAPIST

## 2018-12-17 PROCEDURE — G8982 BODY POS GOAL STATUS: HCPCS | Mod: GP | Performed by: PHYSICAL THERAPIST

## 2019-01-07 ENCOUNTER — THERAPY VISIT (OUTPATIENT)
Dept: PHYSICAL THERAPY | Facility: CLINIC | Age: 69
End: 2019-01-07
Payer: MEDICARE

## 2019-01-07 DIAGNOSIS — M81.0 OSTEOPOROSIS: ICD-10-CM

## 2019-01-07 PROCEDURE — 97112 NEUROMUSCULAR REEDUCATION: CPT | Mod: GP | Performed by: PHYSICAL THERAPIST

## 2019-01-07 PROCEDURE — 97110 THERAPEUTIC EXERCISES: CPT | Mod: GP | Performed by: PHYSICAL THERAPIST

## 2019-01-14 ENCOUNTER — MYC MEDICAL ADVICE (OUTPATIENT)
Dept: FAMILY MEDICINE | Facility: CLINIC | Age: 69
End: 2019-01-14

## 2019-01-14 RX ORDER — AMOXICILLIN 500 MG/1
2000 CAPSULE ORAL ONCE
Qty: 4 CAPSULE | Refills: 1 | Status: SHIPPED | OUTPATIENT
Start: 2019-01-14 | End: 2019-07-16

## 2019-01-14 NOTE — TELEPHONE ENCOUNTER
Corina Lara, NP     1. Request for antibiotics for teeth cleaning next week - pharmacy pended     2. Would you recommend shingles vaccine for patient?     Andria Stanley, Registered Nurse   [facility]

## 2019-01-21 ENCOUNTER — THERAPY VISIT (OUTPATIENT)
Dept: PHYSICAL THERAPY | Facility: CLINIC | Age: 69
End: 2019-01-21
Payer: MEDICARE

## 2019-01-21 DIAGNOSIS — M81.0 OSTEOPOROSIS: ICD-10-CM

## 2019-01-21 PROCEDURE — 97110 THERAPEUTIC EXERCISES: CPT | Mod: GP | Performed by: PHYSICAL THERAPIST

## 2019-01-21 NOTE — LETTER
DEPARTMENT OF HEALTH AND HUMAN SERVICES  CENTERS FOR MEDICARE & MEDICAID SERVICES    PLAN/UPDATED PLAN OF PROGRESS FOR OUTPATIENT REHABILITATION    PATIENTS NAME:  Rhonda Trotter     : 1950    PROVIDER NUMBER:    [id number]    HICN:  [id number]     PROVIDER NAME: [facility]    MEDICAL RECORD NUMBER: [id number]     START OF CARE DATE:  SOC Date: 18   TYPE:  PT    PRIMARY/TREATMENT DIAGNOSIS: (Pertinent Medical Diagnosis)  Osteoporosis    VISITS FROM START OF CARE:  Rxs Used: 12     Discharge Note  Progress reporting period is from initial eval to 2019.     lease see information below for last relevant information on current status.  Patient seen for 12 visits.  SUBJECTIVE  Subjective changes noted by patient:  Less back pain, still a little apprhensive about exercise progression, but doing better.  Staying compliant is biggest problem.  Wants to review all content from visits to make sure doing correctly  .  Current pain level is 0/10.     Previous pain level was   .   Changes in function:  Yes (See Goal flowsheet attached for changes in current functional level)  Adverse reaction to treatment or activity: None    OBJECTIVE  Changes noted in objective findings: 30 min Rev of exercise program and help with making weekly ex schedule.  Emp imp of exercises most benficial for osteoporosis/bone loss prevention, also imp balance training     ASSESSMENT/PLAN  Diagnosis: Osteoporosis   DIAGP:  The encounter diagnosis was Osteoporosis.  Updated problem list and treatment plan:   Decreased function - HEP  Impaired muscle performance - HEP  Impaired balance - HEP  STG/LTGs have been met or progress has been made towards goals:  Yes, please see goal flowsheet for most current information  Assessment of Progress: goals met  Last current status:     Self Management Plans:  HEP  I have re-evaluated this patient and find that the nature, scope, duration and intensity of the  "therapy is appropriate for the medical condition of the patient.  Rhonda continues to require the following intervention to meet STG and LTG's:  HEP.      PATIENTS NAME:  Rhonda Trotter   : 1950    Recommendations:  Discharge with current home program.  Patient to follow up with MD as needed.    Please refer to the daily flowsheet for treatment today, total treatment time and time spent performing 1:1 timed codes.      Caregiver Signature/Credentials _____________________________ Date ________       Treating Provider: Gifty Buckner, PT   I have reviewed and certified the need for these services and plan of treatment while under my care.        PHYSICIAN'S SIGNATURE:   _____________________________________  Date___________     Corina Lara NP    Certification period:  Beginning of Cert date period: 19 to End of Cert period date: 19     Functional Level Progress Report: Please see attached \"Goal Flow sheet for Functional level.\"    ________ Continue Services or       ____X____ DC Services                Service dates: From  SOC Date: 18 date to present                         "

## 2019-01-22 NOTE — PROGRESS NOTES
Discharge Note    Progress reporting period is from initial eval to Jan 21, 2019.     lease see information below for last relevant information on current status.  Patient seen for 12 visits.  SUBJECTIVE  Subjective changes noted by patient:  Less back pain, still a little apprhensive about exercise progression, but doing better.  Staying compliant is biggest problem.  Wants to review all content from visits to make sure doing correctly  .  Current pain level is 0/10.     Previous pain level was   .   Changes in function:  Yes (See Goal flowsheet attached for changes in current functional level)  Adverse reaction to treatment or activity: None    OBJECTIVE  Changes noted in objective findings: 30 min Rev of exercise program and help with making weekly ex schedule.  Emp imp of exercises most benficial for osteoporosis/bone loss prevention, also imp balance training     ASSESSMENT/PLAN  Diagnosis: Osteoporosis   DIAGP:  The encounter diagnosis was Osteoporosis.  Updated problem list and treatment plan:   Decreased function - HEP  Impaired muscle performance - HEP  Impaired balance - HEP  STG/LTGs have been met or progress has been made towards goals:  Yes, please see goal flowsheet for most current information  Assessment of Progress: goals met  Last current status:     Self Management Plans:  HEP  I have re-evaluated this patient and find that the nature, scope, duration and intensity of the therapy is appropriate for the medical condition of the patient.  Rhonda continues to require the following intervention to meet STG and LTG's:  HEP.    Recommendations:  Discharge with current home program.  Patient to follow up with MD as needed.    Please refer to the daily flowsheet for treatment today, total treatment time and time spent performing 1:1 timed codes.

## 2019-06-04 PROBLEM — M81.0 OSTEOPOROSIS: Status: RESOLVED | Noted: 2018-07-25 | Resolved: 2019-06-04

## 2019-07-12 ENCOUNTER — MYC MEDICAL ADVICE (OUTPATIENT)
Dept: FAMILY MEDICINE | Facility: CLINIC | Age: 69
End: 2019-07-12

## 2019-07-12 DIAGNOSIS — Z29.8 * * * SBE PROPHYLAXIS * * *: ICD-10-CM

## 2019-07-16 RX ORDER — AMOXICILLIN 500 MG/1
2000 CAPSULE ORAL ONCE
Qty: 4 CAPSULE | Refills: 1 | Status: SHIPPED | OUTPATIENT
Start: 2019-07-16 | End: 2019-11-13

## 2019-07-17 ENCOUNTER — OFFICE VISIT (OUTPATIENT)
Dept: FAMILY MEDICINE | Facility: CLINIC | Age: 69
End: 2019-07-17
Payer: MEDICARE

## 2019-07-17 ENCOUNTER — MYC MEDICAL ADVICE (OUTPATIENT)
Dept: FAMILY MEDICINE | Facility: CLINIC | Age: 69
End: 2019-07-17

## 2019-07-17 VITALS
TEMPERATURE: 98.3 F | BODY MASS INDEX: 45.72 KG/M2 | WEIGHT: 279 LBS | RESPIRATION RATE: 20 BRPM | HEART RATE: 95 BPM | SYSTOLIC BLOOD PRESSURE: 122 MMHG | OXYGEN SATURATION: 98 % | DIASTOLIC BLOOD PRESSURE: 74 MMHG

## 2019-07-17 DIAGNOSIS — N39.0 URINARY TRACT INFECTION WITHOUT HEMATURIA, SITE UNSPECIFIED: Primary | ICD-10-CM

## 2019-07-17 DIAGNOSIS — E66.01 MORBID OBESITY WITH BMI OF 40.0-44.9, ADULT (H): ICD-10-CM

## 2019-07-17 DIAGNOSIS — J30.9 ALLERGIC RHINITIS, UNSPECIFIED SEASONALITY, UNSPECIFIED TRIGGER: ICD-10-CM

## 2019-07-17 LAB
ALBUMIN UR-MCNC: >=300 MG/DL
APPEARANCE UR: ABNORMAL
BACTERIA #/AREA URNS HPF: ABNORMAL /HPF
BILIRUB UR QL STRIP: ABNORMAL
COLOR UR AUTO: YELLOW
GLUCOSE UR STRIP-MCNC: NEGATIVE MG/DL
HGB UR QL STRIP: ABNORMAL
KETONES UR STRIP-MCNC: 15 MG/DL
LEUKOCYTE ESTERASE UR QL STRIP: ABNORMAL
NITRATE UR QL: NEGATIVE
NON-SQ EPI CELLS #/AREA URNS LPF: ABNORMAL /LPF
PH UR STRIP: 7 PH (ref 5–7)
RBC #/AREA URNS AUTO: ABNORMAL /HPF
SOURCE: ABNORMAL
SP GR UR STRIP: 1.02 (ref 1–1.03)
UROBILINOGEN UR STRIP-ACNC: 1 EU/DL (ref 0.2–1)
WBC #/AREA URNS AUTO: ABNORMAL /HPF

## 2019-07-17 PROCEDURE — 81001 URINALYSIS AUTO W/SCOPE: CPT | Performed by: NURSE PRACTITIONER

## 2019-07-17 PROCEDURE — 99214 OFFICE O/P EST MOD 30 MIN: CPT | Performed by: NURSE PRACTITIONER

## 2019-07-17 PROCEDURE — 87086 URINE CULTURE/COLONY COUNT: CPT | Performed by: NURSE PRACTITIONER

## 2019-07-17 RX ORDER — CIPROFLOXACIN 250 MG/1
250 TABLET, FILM COATED ORAL 2 TIMES DAILY
Qty: 6 TABLET | Refills: 0 | Status: SHIPPED | OUTPATIENT
Start: 2019-07-17 | End: 2019-11-13

## 2019-07-17 NOTE — PROGRESS NOTES
Subjective     Rhonda Trotter is a 68 year old female who presents to clinic today for the following health issues:    HPI     URINARY TRACT SYMPTOMS      Duration: about a week    Description  Urgency, incontinence, frequency  Last night pt ate red licorice     Intensity:  moderate    Accompanying signs and symptoms: odd sensation that happens when pt feels the need to go.   Loose stools recently, so thinks that might have caused a UTI  Allergies have been bothering pt. post nasal drip, sneezing a lot last week. She did take Flonase for this in the spring.     Concerns about being out of breath. Pt's sister pulmonary fibrosis   Fever/chills: no   Flank pain no   Nausea and vomiting: no   Vaginal symptoms: itching all the time: [employer] brand of vagisil helps with itching   Abdominal/Pelvic Pain: no     Therapies tried and outcome: yogurt                 Reviewed and updated as needed this visit by Provider         Review of Systems   ROS COMP: CONSTITUTIONAL: NEGATIVE for fever, chills, change in weight  ENT/MOUTH: post nasal drainage   RESP:see HPI  CV: NEGATIVE for chest pain, palpitations or peripheral edema  GI: NEGATIVE for nausea, abdominal pain, heartburn, or change in bowel habits  : see HPI  PSYCHIATRIC: NEGATIVE for changes in mood or affect      Objective    /74   Pulse 95   Temp 98.3  F (36.8  C) (Oral)   Resp 20   Wt 126.6 kg (279 lb)   LMP 08/01/2004   SpO2 98%   BMI 45.72 kg/m    Body mass index is 45.72 kg/m .  Physical Exam   GENERAL: healthy, alert and no distress  HENT: ear canals and TM's normal, nose and mouth without ulcers or lesions  NECK: no adenopathy, no asymmetry, masses, or scars and thyroid normal to palpation  RESP: lungs clear to auscultation - no rales, rhonchi or wheezes  CV: regular rate and rhythm, normal S1 S2, no S3 or S4, no murmur, click or rub, no peripheral edema and peripheral pulses strong  BACK: no CVA tenderness, no paralumbar tenderness  PSYCH:  mentation appears normal, affect normal/bright    Diagnostic Test Results:  Labs reviewed in Epic  Results for orders placed or performed in visit on 07/17/19 (from the past 24 hour(s))   UA reflex to Microscopic and Culture   Result Value Ref Range    Color Urine Yellow     Appearance Urine Slightly Cloudy     Glucose Urine Negative NEG^Negative mg/dL    Bilirubin Urine Moderate (A) NEG^Negative    Ketones Urine 15 (A) NEG^Negative mg/dL    Specific Gravity Urine 1.025 1.003 - 1.035    Blood Urine Moderate (A) NEG^Negative    pH Urine 7.0 5.0 - 7.0 pH    Protein Albumin Urine >=300 (A) NEG^Negative mg/dL    Urobilinogen Urine 1.0 0.2 - 1.0 EU/dL    Nitrite Urine Negative NEG^Negative    Leukocyte Esterase Urine Large (A) NEG^Negative    Source Midstream Urine    Urine Microscopic   Result Value Ref Range    WBC Urine 25-50 (A) OTO5^0 - 5 /HPF    RBC Urine 10-25 (A) OTO2^O - 2 /HPF    Squamous Epithelial /LPF Urine Moderate (A) FEW^Few /LPF    Bacteria Urine Moderate (A) NEG^Negative /HPF           Assessment & Plan     1. Urinary tract infection without hematuria, site unspecified  Discussed the use and indication of this medication as well as potential side effects.   Push fluids.  Follow up if symptoms persist or worsen.   - ciprofloxacin (CIPRO) 250 MG tablet; Take 1 tablet (250 mg) by mouth 2 times daily for 3 days  Dispense: 6 tablet; Refill: 0    2. Allergic rhinitis, unspecified seasonality, unspecified trigger  Start Claritin and go back on Flonase.  Follow up if symptoms persist or worsen.     3. BMI of 40.0-44.9, adult (H)  Discussed diet and exercise.              No follow-ups on file.    Corina Lara NP  [facility]

## 2019-07-18 LAB
BACTERIA SPEC CULT: NORMAL
BACTERIA SPEC CULT: NORMAL
SPECIMEN SOURCE: NORMAL

## 2019-11-09 ENCOUNTER — MYC MEDICAL ADVICE (OUTPATIENT)
Dept: FAMILY MEDICINE | Facility: CLINIC | Age: 69
End: 2019-11-09

## 2019-11-09 DIAGNOSIS — E78.5 HYPERLIPIDEMIA LDL GOAL <130: ICD-10-CM

## 2019-11-10 ASSESSMENT — ENCOUNTER SYMPTOMS
HEADACHES: 0
ABDOMINAL PAIN: 0
CONSTIPATION: 0
NERVOUS/ANXIOUS: 0
FEVER: 0
PALPITATIONS: 0
ARTHRALGIAS: 0
DIZZINESS: 0
CHILLS: 0
WEAKNESS: 0
SORE THROAT: 0
BREAST MASS: 0
EYE PAIN: 0
COUGH: 1
MYALGIAS: 0
SHORTNESS OF BREATH: 1
JOINT SWELLING: 0
FREQUENCY: 0
NAUSEA: 0
HEMATURIA: 0
DYSURIA: 0
PARESTHESIAS: 0
DIARRHEA: 0
HEARTBURN: 1
HEMATOCHEZIA: 0

## 2019-11-10 ASSESSMENT — ACTIVITIES OF DAILY LIVING (ADL): CURRENT_FUNCTION: NO ASSISTANCE NEEDED

## 2019-11-11 RX ORDER — SIMVASTATIN 20 MG
20 TABLET ORAL AT BEDTIME
Qty: 90 TABLET | Refills: 0 | Status: SHIPPED | OUTPATIENT
Start: 2019-11-11 | End: 2019-11-13

## 2019-11-13 ENCOUNTER — OFFICE VISIT (OUTPATIENT)
Dept: FAMILY MEDICINE | Facility: CLINIC | Age: 69
End: 2019-11-13
Payer: MEDICARE

## 2019-11-13 VITALS
HEART RATE: 70 BPM | RESPIRATION RATE: 18 BRPM | BODY MASS INDEX: 46.32 KG/M2 | SYSTOLIC BLOOD PRESSURE: 125 MMHG | TEMPERATURE: 98.7 F | HEIGHT: 65 IN | DIASTOLIC BLOOD PRESSURE: 80 MMHG | WEIGHT: 278 LBS | OXYGEN SATURATION: 97 %

## 2019-11-13 DIAGNOSIS — M81.0 OSTEOPOROSIS, UNSPECIFIED OSTEOPOROSIS TYPE, UNSPECIFIED PATHOLOGICAL FRACTURE PRESENCE: ICD-10-CM

## 2019-11-13 DIAGNOSIS — E78.5 HYPERLIPIDEMIA LDL GOAL <130: ICD-10-CM

## 2019-11-13 DIAGNOSIS — Z00.00 ENCOUNTER FOR MEDICARE ANNUAL WELLNESS EXAM: Primary | ICD-10-CM

## 2019-11-13 DIAGNOSIS — E03.9 HYPOTHYROIDISM, UNSPECIFIED TYPE: ICD-10-CM

## 2019-11-13 DIAGNOSIS — I10 ESSENTIAL HYPERTENSION WITH GOAL BLOOD PRESSURE LESS THAN 140/90: ICD-10-CM

## 2019-11-13 DIAGNOSIS — J30.9 ALLERGIC RHINITIS, UNSPECIFIED SEASONALITY, UNSPECIFIED TRIGGER: ICD-10-CM

## 2019-11-13 LAB
ANION GAP SERPL CALCULATED.3IONS-SCNC: 5 MMOL/L (ref 3–14)
BUN SERPL-MCNC: 13 MG/DL (ref 7–30)
CALCIUM SERPL-MCNC: 9.1 MG/DL (ref 8.5–10.1)
CHLORIDE SERPL-SCNC: 98 MMOL/L (ref 94–109)
CHOLEST SERPL-MCNC: 177 MG/DL
CO2 SERPL-SCNC: 28 MMOL/L (ref 20–32)
CREAT SERPL-MCNC: 0.72 MG/DL (ref 0.52–1.04)
GFR SERPL CREATININE-BSD FRML MDRD: 86 ML/MIN/{1.73_M2}
GLUCOSE SERPL-MCNC: 88 MG/DL (ref 70–99)
HDLC SERPL-MCNC: 70 MG/DL
LDLC SERPL CALC-MCNC: 79 MG/DL
NONHDLC SERPL-MCNC: 107 MG/DL
POTASSIUM SERPL-SCNC: 4.6 MMOL/L (ref 3.4–5.3)
SODIUM SERPL-SCNC: 131 MMOL/L (ref 133–144)
TRIGL SERPL-MCNC: 138 MG/DL
TSH SERPL DL<=0.005 MIU/L-ACNC: 2.97 MU/L (ref 0.4–4)

## 2019-11-13 PROCEDURE — 84443 ASSAY THYROID STIM HORMONE: CPT | Performed by: NURSE PRACTITIONER

## 2019-11-13 PROCEDURE — 36415 COLL VENOUS BLD VENIPUNCTURE: CPT | Performed by: NURSE PRACTITIONER

## 2019-11-13 PROCEDURE — 80061 LIPID PANEL: CPT | Performed by: NURSE PRACTITIONER

## 2019-11-13 PROCEDURE — 80048 BASIC METABOLIC PNL TOTAL CA: CPT | Performed by: NURSE PRACTITIONER

## 2019-11-13 PROCEDURE — 99213 OFFICE O/P EST LOW 20 MIN: CPT | Mod: 25 | Performed by: NURSE PRACTITIONER

## 2019-11-13 PROCEDURE — G0439 PPPS, SUBSEQ VISIT: HCPCS | Performed by: NURSE PRACTITIONER

## 2019-11-13 RX ORDER — SIMVASTATIN 20 MG
20 TABLET ORAL AT BEDTIME
Qty: 90 TABLET | Status: SHIPPED | OUTPATIENT
Start: 2019-11-13 | End: 2020-02-05

## 2019-11-13 RX ORDER — LISINOPRIL AND HYDROCHLOROTHIAZIDE 12.5; 2 MG/1; MG/1
1 TABLET ORAL DAILY
Qty: 90 TABLET | Status: SHIPPED | OUTPATIENT
Start: 2019-11-13 | End: 2019-11-20

## 2019-11-13 RX ORDER — LEVOTHYROXINE SODIUM 100 UG/1
100 TABLET ORAL DAILY
Qty: 90 TABLET | Status: SHIPPED | OUTPATIENT
Start: 2019-11-13 | End: 2020-02-05

## 2019-11-13 RX ORDER — ALENDRONATE SODIUM 70 MG/1
TABLET ORAL
Qty: 12 TABLET | Status: SHIPPED | OUTPATIENT
Start: 2019-11-13 | End: 2020-03-16

## 2019-11-13 ASSESSMENT — ENCOUNTER SYMPTOMS
ABDOMINAL PAIN: 0
NERVOUS/ANXIOUS: 0
JOINT SWELLING: 0
WEAKNESS: 0
CONSTIPATION: 0
SORE THROAT: 0
HEMATURIA: 0
PALPITATIONS: 0
DYSURIA: 0
HEMATOCHEZIA: 0
FREQUENCY: 0
COUGH: 1
DIARRHEA: 0
DIZZINESS: 0
NAUSEA: 0
MYALGIAS: 0
SHORTNESS OF BREATH: 1
EYE PAIN: 0
HEADACHES: 0
BREAST MASS: 0
HEARTBURN: 1
ARTHRALGIAS: 0
FEVER: 0
CHILLS: 0
PARESTHESIAS: 0

## 2019-11-13 ASSESSMENT — ACTIVITIES OF DAILY LIVING (ADL): CURRENT_FUNCTION: NO ASSISTANCE NEEDED

## 2019-11-13 ASSESSMENT — MIFFLIN-ST. JEOR: SCORE: 1796.88

## 2019-11-13 NOTE — PROGRESS NOTES
"SUBJECTIVE:   Rhonda Trotter is a 69 year old female who presents for Preventive Visit.    Are you in the first 12 months of your Medicare coverage?  No    Healthy Habits:     In general, how would you rate your overall health?  Good    Frequency of exercise:  2-3 days/week    Duration of exercise:  15-30 minutes    Do you usually eat at least 4 servings of fruit and vegetables a day, include whole grains    & fiber and avoid regularly eating high fat or \"junk\" foods?  No    Taking medications regularly:  Yes    Medication side effects:  None    Ability to successfully perform activities of daily living:  No assistance needed    Home Safety:  No safety concerns identified    Hearing Impairment:  No hearing concerns    In the past 6 months, have you been bothered by leaking of urine? Yes    In general, how would you rate your overall mental or emotional health?  Good      PHQ-2 Total Score: 0    Additional concerns today:  No    Do you feel safe in your environment? Yes    Have you ever done Advance Care Planning? (For example, a Health Directive, POLST, or a discussion with a medical provider or your loved ones about your wishes): Yes, advance care planning is on file.      Fall risk  Fallen 2 or more times in the past year?: No  Any fall with injury in the past year?: No    Cognitive Screening  Recalled 3 words  Normal clock        Do you have sleep apnea, excessive snoring or daytime drowsiness?: no. Once in a while trouble falling a sleep. Occasionally taking sleep aid when needed.     Reviewed and updated as needed this visit by clinical staff  Tobacco  Allergies  Meds  Med Hx  Surg Hx  Fam Hx  Soc Hx        Reviewed and updated as needed this visit by Provider        Social History     Tobacco Use     Smoking status: Never Smoker     Smokeless tobacco: Never Used   Substance Use Topics     Alcohol use: Yes     Comment: very light use         Alcohol Use 11/10/2019   Prescreen: >3 drinks/day or >7 " drinks/week? No   Prescreen: >3 drinks/day or >7 drinks/week? -       She is doing well on her current blood pressure medication and denies any side effects.  Her blood pressure has been well-controlled.    She has been on Fosamax for the past year and is tolerating it well.      She has been having chronic post nasal drainage, mild cough and occasional hoarseness, which she thinks is due to allergies.  She does use Flonase.  She would like to have allergy testing.         Current providers sharing in care for this patient include:   Patient Care Team:  Corina Lara NP as PCP - General  Corina Lara NP as Assigned PCP    The following health maintenance items are reviewed in Epic and correct as of today:  Health Maintenance   Topic Date Due     ZOSTER IMMUNIZATION (1 of 2) 07/30/2002     BMP  11/01/2019     FALL RISK ASSESSMENT  11/01/2019     MEDICARE ANNUAL WELLNESS VISIT  11/01/2019     TSH W/FREE T4 REFLEX  11/01/2019     MAMMO SCREENING  11/28/2020     PNEUMOCOCCAL IMMUNIZATION 65+ LOW/MEDIUM RISK (2 of 2 - PPSV23) 02/07/2021     ADVANCE CARE PLANNING  02/16/2021     DTAP/TDAP/TD IMMUNIZATION (3 - Td) 05/06/2021     COLONOSCOPY  05/03/2023     LIPID  11/01/2023     DEXA  Completed     HEPATITIS C SCREENING  Completed     PHQ-2  Completed     INFLUENZA VACCINE  Completed     IPV IMMUNIZATION  Aged Out     MENINGITIS IMMUNIZATION  Aged Out           Review of Systems   Constitutional: Negative for chills and fever.   HENT: Negative for congestion, ear pain, hearing loss and sore throat.    Eyes: Negative for pain and visual disturbance.   Respiratory: Positive for cough and shortness of breath.    Cardiovascular: Negative for chest pain, palpitations and peripheral edema.   Gastrointestinal: Positive for heartburn. Negative for abdominal pain, constipation, diarrhea, hematochezia and nausea.   Breasts:  Negative for tenderness, breast mass and discharge.   Genitourinary: Negative for dysuria,  "frequency, genital sores, hematuria, pelvic pain, urgency, vaginal bleeding and vaginal discharge.   Musculoskeletal: Negative for arthralgias, joint swelling and myalgias.   Skin: Negative for rash.   Neurological: Negative for dizziness, weakness, headaches and paresthesias.   Psychiatric/Behavioral: Negative for mood changes. The patient is not nervous/anxious.          OBJECTIVE:   /80   Pulse 70   Temp 98.7  F (37.1  C) (Oral)   Resp 18   Ht 1.651 m (5' 5\")   Wt 126.1 kg (278 lb)   LMP 08/01/2004   SpO2 97%   BMI 46.26 kg/m   Estimated body mass index is 46.26 kg/m  as calculated from the following:    Height as of this encounter: 1.651 m (5' 5\").    Weight as of this encounter: 126.1 kg (278 lb).  Physical Exam  GENERAL: healthy, alert and no distress  EYES: Eyes grossly normal to inspection, PERRL and conjunctivae and sclerae normal  HENT: ear canals and TM's normal, nose and mouth without ulcers or lesions  NECK: no adenopathy, no asymmetry, masses, or scars and thyroid normal to palpation  RESP: lungs clear to auscultation - no rales, rhonchi or wheezes  BREAST: normal without masses, tenderness or nipple discharge and no palpable axillary masses or adenopathy  CV: regular rate and rhythm, normal S1 S2, no S3 or S4, no murmur, click or rub, no peripheral edema and peripheral pulses strong  ABDOMEN: soft, nontender, no hepatosplenomegaly, no masses and bowel sounds normal  MS: no gross musculoskeletal defects noted, no edema  SKIN: no suspicious lesions or rashes  NEURO: Normal strength and tone, mentation intact and speech normal  PSYCH: mentation appears normal, affect normal/bright    Diagnostic Test Results:  Labs reviewed in Epic    ASSESSMENT / PLAN:   1. Encounter for Medicare annual wellness exam      2. Essential hypertension with goal blood pressure less than 140/90  At goal  The current medical regimen is effective;  continue present plan and medications.   - " "lisinopril-hydrochlorothiazide (PRINZIDE/ZESTORETIC) 20-12.5 MG tablet; Take 1 tablet by mouth daily  Dispense: 90 tablet; Refill: PRN  - Basic metabolic panel  (Ca, Cl, CO2, Creat, Gluc, K, Na, BUN)    3. Hypothyroidism, unspecified type  Will adjust dose if needed based on lab results.   - levothyroxine (SYNTHROID/LEVOTHROID) 100 MCG tablet; Take 1 tablet (100 mcg) by mouth daily  Dispense: 90 tablet; Refill: PRN  - TSH with free T4 reflex    4. Osteoporosis, unspecified osteoporosis type, unspecified pathological fracture presence  Will recheck a DEXA in May.   - alendronate (FOSAMAX) 70 MG tablet; Take 1 tablet (70 mg) by mouth with 8oz water every 7 days 30 minutes before breakfast and remain upright during this time.  Dispense: 12 tablet; Refill: PRN  - DX Hip/Pelvis/Spine; Future    5. Hyperlipidemia LDL goal <130  The current medical regimen is effective;  continue present plan and medications.   - simvastatin (ZOCOR) 20 MG tablet; Take 1 tablet (20 mg) by mouth At Bedtime  Dispense: 90 tablet; Refill: PRN  - Lipid panel reflex to direct LDL Fasting    6. Allergic rhinitis, unspecified seasonality, unspecified trigger  Referral to allergy.   - ALLERGY/ASTHMA ADULT REFERRAL    COUNSELING:  Reviewed preventive health counseling, as reflected in patient instructions    Estimated body mass index is 46.26 kg/m  as calculated from the following:    Height as of this encounter: 1.651 m (5' 5\").    Weight as of this encounter: 126.1 kg (278 lb).    Weight management plan: Discussed healthy diet and exercise guidelines     reports that she has never smoked. She has never used smokeless tobacco.      Appropriate preventive services were discussed with this patient, including applicable screening as appropriate for cardiovascular disease, diabetes, osteopenia/osteoporosis, and glaucoma.  As appropriate for age/gender, discussed screening for colorectal cancer, prostate cancer, breast cancer, and cervical cancer. " Checklist reviewing preventive services available has been given to the patient.    Reviewed patients plan of care and provided an AVS. The Basic Care Plan (routine screening as documented in Health Maintenance) for Rhonda meets the Care Plan requirement. This Care Plan has been established and reviewed with the Patient.    Counseling Resources:  ATP IV Guidelines  Pooled Cohorts Equation Calculator  Breast Cancer Risk Calculator  FRAX Risk Assessment  ICSI Preventive Guidelines  Dietary Guidelines for Americans, 2010  [employer] MyPlate  ASA Prophylaxis  Lung CA Screening    Corina Lara NP  [facility]    Identified Health Risks:    The patient was counseled and encouraged to consider modifying their diet and eating habits. She was provided with information on recommended healthy diet options.  Information on urinary incontinence and treatment options given to patient.

## 2019-11-13 NOTE — PATIENT INSTRUCTIONS
Patient Education   Personalized Prevention Plan  You are due for the preventive services outlined below.  Your care team is available to assist you in scheduling these services.  If you have already completed any of these items, please share that information with your care team to update in your medical record.  Health Maintenance Due   Topic Date Due     Zoster (Shingles) Vaccine (1 of 2) 07/30/2002     Flu Vaccine (1) 09/01/2019     Basic Metabolic Panel  11/01/2019     FALL RISK ASSESSMENT  11/01/2019     Annual Wellness Visit  11/01/2019     Thyroid Function Lab  11/01/2019       Understanding USDA MyPlate  The USDA (U.S. Department of Agriculture) has guidelines to help you make healthy food choices. These are called MyPlate. MyPlate shows the food groups that make up healthy meals using the image of a place setting. Before you eat, think about the healthiest choices for what to put onto your plate or into your cup or bowl. To learn more about building a healthy plate, visit www.choosemyplate.gov.    The food groups    Fruits. Any fruit or 100% fruit juice counts as part of the Fruit Group. Fruits may be fresh, canned, frozen, or dried, and may be whole, cut-up, or pureed. Make half your plate fruits and vegetables.    Vegetables. Any vegetable or 100% vegetable juice counts as a member of the Vegetable Group. Vegetables may be fresh, frozen, canned, or dried. They can be served raw or cooked and may be whole, cut-up, or mashed. Make half your plate fruits and vegetables.    Grains. All foods made from grains are part of the Grains Group. These include wheat, rice, oats, cornmeal, and barley such as bread, pasta, oatmeal, cereal, tortillas, and grits. Grains should be no more than a quarter of your plate. At least half of your grains should be whole grains.    Protein. This group includes meat, poultry, seafood, beans and peas, eggs, processed soy products (like tofu), nuts (including nut butters), and  seeds. Make protein choices no more than a quarter of your plate. Meat and poultry choices should be lean or low fat.    Dairy. All fluid milk products and foods made from milk that contain calcium, like yogurt and cheese, are part of the Dairy Group. (Foods that have little calcium, such as cream, butter, and cream cheese, are not part of the group.) Most dairy choices should be low-fat or fat-free.    Oils. These are fats that are liquid at room temperature. They include canola, corn, olive, soybean, and sunflower oil. Foods that are mainly oil include mayonnaise, certain salad dressings, and soft margarines. You should have only 5 to 7 teaspoons of oils a day. You probably already get this much from the food you eat.  Date Last Reviewed: 8/1/2017 2000-2018 [employer]. [address] [place] [address]. All rights reserved. This information is not intended as a substitute for professional medical care. Always follow your healthcare professional's instructions.          Urinary Incontinence, Female (Adult)  Urinary incontinence means loss of control of the bladder. This problem affects many women, especially as they get older. If you have incontinence, you may be embarrassed to ask for help. But know that this problem can be treated.  Types of Incontinence  There are different types of incontinence. Two of the main types are described here. You can have more than one type.    Stress incontinence. With this type, urine leaks when pressure (stress) is put on the bladder. This may happen when you cough, sneeze, or laugh. Stress incontinence most often occurs because the pelvic floor muscles that support the bladder and urethra are weak. This can happen after pregnancy and vaginal childbirth or a hysterectomy. It can also be due to excess body weight or hormone changes.    Urge incontinence (also called overactive bladder). With this type, a sudden urge to urinate is felt often. This may  happen even though there may not be much urine in the bladder. The need to urinate often during the night is common. Urge incontinence most often occurs because of bladder spasms. This may be due to bladder irritation or infection. Damage to bladder nerves or pelvic muscles, constipation, and certain medicines can also lead to urge incontinence.  Treatment of urinary incontinence depends on the cause. Further evaluation is needed to find the type you have. This will likely include an exam and certain tests. Based on the results, you and your healthcare provider can then plan treatment. Until a diagnosis is made, the home care tips below can help relieve symptoms.  Home care    Do pelvic floor muscle exercises, if they are prescribed. The pelvic floor muscles help support the bladder and urethra. Many women find that their symptoms improve when doing special exercises that strengthen these muscles. To do the exercises contract the muscles you would use to stop your stream of urine, but do this when you re not urinating. Hold for 10 seconds, then relax. Repeat 10 to 20 times in a row, at least 3 times a day. Your provider may give you other instructions for how to do the exercises and how often.    Keep a bladder diary. This helps track how often and how much you urinate over a set period of time. Bring this diary with you to your next visit with the provider. The information can help your provider learn more about your bladder problem.    Lose weight, if advised to by your provider. Excess weight puts pressure on the bladder. Your provider can help you create a weight-loss plan that s right for you. This may include exercising more and making certain diet changes.    Don't consume foods and drinks that may irritate the bladder. These can include alcohol and caffeinated drinks.    Quit smoking. Smoking and other tobacco use can lead to chronic cough that strains the pelvic floor muscles. Smoking may also damage the  bladder and urethra. Talk with your provider about treatments or methods you can use to quit smoking.    If drinking large amounts of fluid causes you to have symptoms, you may be advised to limit your fluid intake. You may also be advised to drink most of your fluids during the day and to limit fluids at night.    If you re worried about urine leakage or accidents, you may wear absorbent pads to catch urine. Change the pads often. This helps reduce discomfort. It may also reduce the risk of skin or bladder infections.  Follow-up care  Follow up with your healthcare provider, or as directed. It may take some to find the right treatment for your problem. Your treatment plan may include special therapies or medicines. Certain procedures or surgery may also be options. Be sure to discuss any questions you have with your provider.  When to seek medical advice  Call the healthcare provider right away if any of these occur:    Fever of 100.4 F (38 C) or higher, or as directed by your provider    Bladder pain or fullness    Abdominal swelling    Nausea or vomiting    Back pain    Weakness, dizziness or fainting  Date Last Reviewed: 10/1/2017    3903-5930 [employer]. [address] [place] [address], [place] [address]. All rights reserved. This information is not intended as a substitute for professional medical care. Always follow your healthcare professional's instructions.

## 2019-11-20 DIAGNOSIS — I10 ESSENTIAL HYPERTENSION WITH GOAL BLOOD PRESSURE LESS THAN 140/90: ICD-10-CM

## 2019-11-20 DIAGNOSIS — E87.1 HYPONATREMIA: Primary | ICD-10-CM

## 2019-11-20 RX ORDER — LISINOPRIL 30 MG/1
30 TABLET ORAL DAILY
Qty: 90 TABLET | Status: SHIPPED | OUTPATIENT
Start: 2019-11-20 | End: 2020-11-09

## 2020-01-18 ENCOUNTER — MYC MEDICAL ADVICE (OUTPATIENT)
Dept: FAMILY MEDICINE | Facility: CLINIC | Age: 70
End: 2020-01-18

## 2020-01-18 DIAGNOSIS — M81.0 OSTEOPOROSIS, UNSPECIFIED OSTEOPOROSIS TYPE, UNSPECIFIED PATHOLOGICAL FRACTURE PRESENCE: ICD-10-CM

## 2020-01-18 DIAGNOSIS — Z79.2 NEED FOR PROPHYLACTIC ANTIBIOTIC: ICD-10-CM

## 2020-01-20 RX ORDER — AMOXICILLIN 500 MG/1
2000 CAPSULE ORAL SEE ADMIN INSTRUCTIONS
Qty: 4 CAPSULE | Refills: 1 | Status: SHIPPED | OUTPATIENT
Start: 2020-01-20 | End: 2020-04-23

## 2020-01-21 ENCOUNTER — MYC MEDICAL ADVICE (OUTPATIENT)
Dept: FAMILY MEDICINE | Facility: CLINIC | Age: 70
End: 2020-01-21

## 2020-01-22 ENCOUNTER — MYC MEDICAL ADVICE (OUTPATIENT)
Dept: FAMILY MEDICINE | Facility: CLINIC | Age: 70
End: 2020-01-22

## 2020-01-22 DIAGNOSIS — E87.1 HYPONATREMIA: ICD-10-CM

## 2020-01-22 LAB
ANION GAP SERPL CALCULATED.3IONS-SCNC: 7 MMOL/L (ref 3–14)
BUN SERPL-MCNC: 11 MG/DL (ref 7–30)
CALCIUM SERPL-MCNC: 9.3 MG/DL (ref 8.5–10.1)
CHLORIDE SERPL-SCNC: 102 MMOL/L (ref 94–109)
CO2 SERPL-SCNC: 27 MMOL/L (ref 20–32)
CREAT SERPL-MCNC: 0.81 MG/DL (ref 0.52–1.04)
GFR SERPL CREATININE-BSD FRML MDRD: 75 ML/MIN/{1.73_M2}
GLUCOSE SERPL-MCNC: 123 MG/DL (ref 70–99)
POTASSIUM SERPL-SCNC: 4.6 MMOL/L (ref 3.4–5.3)
SODIUM SERPL-SCNC: 136 MMOL/L (ref 133–144)

## 2020-01-22 PROCEDURE — 80048 BASIC METABOLIC PNL TOTAL CA: CPT | Performed by: NURSE PRACTITIONER

## 2020-01-22 PROCEDURE — 36415 COLL VENOUS BLD VENIPUNCTURE: CPT | Performed by: NURSE PRACTITIONER

## 2020-01-23 NOTE — TELEPHONE ENCOUNTER
Erica,    Please see her message. Can you comment on her blood sugar, it was nonfasting    Merle Ureña, RN, BSN

## 2020-02-02 ENCOUNTER — MYC MEDICAL ADVICE (OUTPATIENT)
Dept: FAMILY MEDICINE | Facility: CLINIC | Age: 70
End: 2020-02-02

## 2020-02-03 DIAGNOSIS — E03.9 HYPOTHYROIDISM, UNSPECIFIED TYPE: ICD-10-CM

## 2020-02-03 DIAGNOSIS — E78.5 HYPERLIPIDEMIA LDL GOAL <130: ICD-10-CM

## 2020-02-03 NOTE — TELEPHONE ENCOUNTER
Yes, she should already be doing this (per my note from her 11/20 labs).  I did send in a prescription at that time.

## 2020-02-04 RX ORDER — LEVOTHYROXINE SODIUM 100 UG/1
TABLET ORAL
Qty: 0.1 TABLET | Refills: 0 | OUTPATIENT
Start: 2020-02-04

## 2020-02-04 RX ORDER — SIMVASTATIN 20 MG
TABLET ORAL
Qty: 0.1 TABLET | Refills: 0 | OUTPATIENT
Start: 2020-02-04

## 2020-02-04 NOTE — TELEPHONE ENCOUNTER
"Requested Prescriptions   Pending Prescriptions Disp Refills     levothyroxine (SYNTHROID/LEVOTHROID) 100 MCG tablet [Pharmacy Med Name: L-THYROXINE (SYNTHROID) TABS 100MCG] 90 tablet 4     Sig: TAKE 1 TABLET DAILY       Thyroid Protocol Passed - 2/3/2020 11:27 PM        Passed - Patient is 12 years or older        Passed - Recent (12 mo) or future (30 days) visit within the authorizing provider's specialty     Patient has had an office visit with the authorizing provider or a provider within the authorizing providers department within the previous 12 mos or has a future within next 30 days. See \"Patient Info\" tab in inbasket, or \"Choose Columns\" in Meds & Orders section of the refill encounter.              Passed - Medication is active on med list        Passed - Normal TSH on file in past 12 months     Recent Labs   Lab Test 11/13/19  0901   TSH 2.97              Passed - No active pregnancy on record     If patient is pregnant or has had a positive pregnancy test, please check TSH.          Passed - No positive pregnancy test in past 12 months     If patient is pregnant or has had a positive pregnancy test, please check TSH.          simvastatin (ZOCOR) 20 MG tablet [Pharmacy Med Name: SIMVASTATIN TABS 20MG] 90 tablet 4     Sig: TAKE 1 TABLET AT BEDTIME (LAST REFILL, NEEDS VISIT)       Statins Protocol Passed - 2/3/2020 11:27 PM        Passed - LDL on file in past 12 months     Recent Labs   Lab Test 11/13/19  0901   LDL 79             Passed - No abnormal creatine kinase in past 12 months     No lab results found.             Passed - Recent (12 mo) or future (30 days) visit within the authorizing provider's specialty     Patient has had an office visit with the authorizing provider or a provider within the authorizing providers department within the previous 12 mos or has a future within next 30 days. See \"Patient Info\" tab in inbasket, or \"Choose Columns\" in Meds & Orders section of the refill encounter.  "             Passed - Medication is active on med list        Passed - Patient is age 18 or older        Passed - No active pregnancy on record        Passed - No positive pregnancy test in past 12 months        Refused Prescriptions:                       Disp   Refills    levothyroxine (SYNTHROID/LEVOTHROID) 100 M*0.1 ta*0        Sig: TAKE 1 TABLET DAILY  Refused By: GUNJAN YUEN  Reason for Refusal: Duplicate    simvastatin (ZOCOR) 20 MG tablet [Pharmacy*0.1 ta*0        Sig: TAKE 1 TABLET AT BEDTIME (LAST REFILL, NEEDS VISIT)  Refused By: GUNJAN YUEN  Reason for Refusal: Duplicate    Refills on file

## 2020-02-05 ENCOUNTER — MYC MEDICAL ADVICE (OUTPATIENT)
Dept: FAMILY MEDICINE | Facility: CLINIC | Age: 70
End: 2020-02-05

## 2020-02-05 DIAGNOSIS — E78.5 HYPERLIPIDEMIA LDL GOAL <130: ICD-10-CM

## 2020-02-05 DIAGNOSIS — E03.9 HYPOTHYROIDISM, UNSPECIFIED TYPE: ICD-10-CM

## 2020-02-05 RX ORDER — LEVOTHYROXINE SODIUM 100 UG/1
100 TABLET ORAL DAILY
Qty: 90 TABLET | Refills: 2 | Status: SHIPPED | OUTPATIENT
Start: 2020-02-05 | End: 2020-11-09

## 2020-02-05 RX ORDER — SIMVASTATIN 20 MG
20 TABLET ORAL AT BEDTIME
Qty: 90 TABLET | Refills: 2 | Status: SHIPPED | OUTPATIENT
Start: 2020-02-05 | End: 2020-11-09

## 2020-03-13 DIAGNOSIS — M81.0 OSTEOPOROSIS, UNSPECIFIED OSTEOPOROSIS TYPE, UNSPECIFIED PATHOLOGICAL FRACTURE PRESENCE: ICD-10-CM

## 2020-03-13 NOTE — TELEPHONE ENCOUNTER
"Requested Prescriptions   Pending Prescriptions Disp Refills     alendronate (FOSAMAX) 70 MG tablet [Pharmacy Med Name: ALENDRONATE SOD TABS 4'S 70MG]  Last Written Prescription Date:  11/13/19  Last Fill Quantity: 12,  # refills: PRN   Last office visit: 11/13/2019 with prescribing provider:  Marco   Future Office Visit:     12 tablet 3     Sig: TAKE 1 TABLET WITH 8 OUNCES OF WATER EVERY 7 DAYS, 30 MINUTES BEFORE BREAKFAST AND REMAIN UPRIGHT DURING THIS TIME       Bisphosphonates Passed - 3/13/2020 12:00 PM        Passed - Recent (12 mo) or future (30 days) visit within the authorizing provider's specialty     Patient has had an office visit with the authorizing provider or a provider within the authorizing providers department within the previous 12 mos or has a future within next 30 days. See \"Patient Info\" tab in inbasket, or \"Choose Columns\" in Meds & Orders section of the refill encounter.              Passed - Dexa on file within past 2 years     Please review last Dexa result.           Passed - Medication is active on med list        Passed - Patient is age 18 or older        Passed - Normal serum creatinine on file within past 12 months     Recent Labs   Lab Test 01/22/20  1058   CR 0.81       Ok to refill medication if creatinine is low               "

## 2020-03-16 ENCOUNTER — MYC MEDICAL ADVICE (OUTPATIENT)
Dept: FAMILY MEDICINE | Facility: CLINIC | Age: 70
End: 2020-03-16

## 2020-03-16 DIAGNOSIS — M81.0 OSTEOPOROSIS, UNSPECIFIED OSTEOPOROSIS TYPE, UNSPECIFIED PATHOLOGICAL FRACTURE PRESENCE: ICD-10-CM

## 2020-03-16 RX ORDER — ALENDRONATE SODIUM 70 MG/1
TABLET ORAL
Qty: 12 TABLET | Refills: 1 | Status: SHIPPED | OUTPATIENT
Start: 2020-03-16 | End: 2020-03-18

## 2020-03-16 RX ORDER — ALENDRONATE SODIUM 70 MG/1
TABLET ORAL
Qty: 12 TABLET | Refills: 3 | OUTPATIENT
Start: 2020-03-16

## 2020-03-17 ENCOUNTER — MYC MEDICAL ADVICE (OUTPATIENT)
Dept: FAMILY MEDICINE | Facility: CLINIC | Age: 70
End: 2020-03-17

## 2020-03-17 DIAGNOSIS — M81.0 OSTEOPOROSIS, UNSPECIFIED OSTEOPOROSIS TYPE, UNSPECIFIED PATHOLOGICAL FRACTURE PRESENCE: ICD-10-CM

## 2020-03-18 RX ORDER — ALENDRONATE SODIUM 70 MG/1
TABLET ORAL
Qty: 12 TABLET | Refills: 3 | Status: SHIPPED | OUTPATIENT
Start: 2020-03-18 | End: 2020-11-09

## 2020-04-22 ENCOUNTER — NURSE TRIAGE (OUTPATIENT)
Dept: FAMILY MEDICINE | Facility: CLINIC | Age: 70
End: 2020-04-22

## 2020-04-22 NOTE — TELEPHONE ENCOUNTER
Additional Information    Negative: Difficult to awaken or acting confused (e.g., disoriented, slurred speech)    Negative: New neurologic deficit that is present NOW, sudden onset of ANY of the following: * Weakness of the face, arm, or leg on one side of the body * Numbness of the face, arm, or leg on one side of the body * Loss of speech or garbled speech    Negative: Sounds like a life-threatening emergency to the triager    Negative: Confusion, disorientation, or hallucinations is the main symptom    Negative: Dizziness is the main symptom    Negative: Followed a head injury within last 3 days    Negative: Headache (with neurologic deficit)    Negative: Unable to urinate (or only a few drops) and bladder feels very full    Negative: Loss of control of bowel or bladder (i.e., incontinence) of new onset    Negative: Back pain with numbness (loss of sensation) in groin or rectal area    Negative: Patient sounds very sick or weak to the triager    Tingling (e.g., pins and needles) of the face, arm or leg on one side of the body, that is  present now    Protocols used: NEUROLOGIC DEFICIT-A-[place]

## 2020-04-22 NOTE — TELEPHONE ENCOUNTER
"S-(situation): Pt states that she has \"a combination of anxiety and constipation, this has happened in the past\" Had decent BM today and yesterday but before this \"kind of backed up\". Last night the crook of her left elbow felt like something jiggling and she put her arm out and it stopped.    RIght now a little tingling on the left side of her face, across the top of her back, left arm. This tingly started last night . Maybe her jaw too \"You hear these things and I am a worrier\"  These sx started last night,sx come and go  Better as the evening progressed.  These sx started 1-2 hours ago  No arm or leg weakness, no speech problems      B-(background):  Used to work at  and they have onsite clinic. When she had this feeling on the side of her face told it was a nerve  Denies hx of panic attacks but was dx'd with anxiety in the past \"I think this is what these things are, I keep worrying\"       A-(assessment): likely anxiety attack    R-(recommendations): appt given tomorrow for virtual visit with PCP Marco    Advise she call [employer] [facility] line ([phone]) for postive  Also may try to breath into a paper bag when she feels tingling  Relaxing breathing exercises   Stop watching news  Call a friend     If she has weakness, can't use arm or leg, or speech not right or thinking not clear go to ED    May also try epsom salt bath which is relaxing    Erica , do you agree with this?    Merle Ureña, RN, BSN             "

## 2020-04-22 NOTE — TELEPHONE ENCOUNTER
Patient reporting in tingling left arm & face. Patient also reporting diarrhea since yesterday. Call transferred to emergent line.    Merlene CORTEZ     [facility]

## 2020-04-22 NOTE — TELEPHONE ENCOUNTER
Erica,    Appt given tomorrow with you . She felt ok to wait and recognized this likely all stress related and sx abated as she was talking on phone with me. Long conversation.    If she has weakness, can't speak correctly, dizzy, confused thinking to go to ED     Merle Ureña, RN, BSN

## 2020-04-23 ENCOUNTER — VIRTUAL VISIT (OUTPATIENT)
Dept: FAMILY MEDICINE | Facility: CLINIC | Age: 70
End: 2020-04-23
Payer: MEDICARE

## 2020-04-23 ENCOUNTER — TELEPHONE (OUTPATIENT)
Dept: FAMILY MEDICINE | Facility: CLINIC | Age: 70
End: 2020-04-23

## 2020-04-23 DIAGNOSIS — M54.2 CERVICALGIA: ICD-10-CM

## 2020-04-23 DIAGNOSIS — F41.9 ANXIETY: Primary | ICD-10-CM

## 2020-04-23 PROCEDURE — 99443 ZZC PHYSICIAN TELEPHONE EVALUATION 21-30 MIN: CPT | Performed by: NURSE PRACTITIONER

## 2020-04-23 ASSESSMENT — ANXIETY QUESTIONNAIRES
2. NOT BEING ABLE TO STOP OR CONTROL WORRYING: SEVERAL DAYS
5. BEING SO RESTLESS THAT IT IS HARD TO SIT STILL: NOT AT ALL
3. WORRYING TOO MUCH ABOUT DIFFERENT THINGS: SEVERAL DAYS
1. FEELING NERVOUS, ANXIOUS, OR ON EDGE: SEVERAL DAYS
6. BECOMING EASILY ANNOYED OR IRRITABLE: NOT AT ALL
GAD7 TOTAL SCORE: 4
7. FEELING AFRAID AS IF SOMETHING AWFUL MIGHT HAPPEN: SEVERAL DAYS

## 2020-04-23 ASSESSMENT — PATIENT HEALTH QUESTIONNAIRE - PHQ9
SUM OF ALL RESPONSES TO PHQ QUESTIONS 1-9: 1
5. POOR APPETITE OR OVEREATING: NOT AT ALL

## 2020-04-23 NOTE — TELEPHONE ENCOUNTER
Reason for Call:  Other call back and returning call    Detailed comments: patient missed Lalitha's phone call today for her upcoming appointment.    She was on another call.    FYI    Phone Number Patient can be reached at: Cell number on file:    Telephone Information:   Mobile [phone]       Best Time: asap    Can we leave a detailed message on this number? YES    Call taken on 4/23/2020 at 1:37 PM by Liyah Millard

## 2020-04-23 NOTE — TELEPHONE ENCOUNTER
"I did get a hold of patient and changed the appointment to \"waiting\".  I will check with Corina Almonte MA    "

## 2020-04-23 NOTE — PROGRESS NOTES
"Rhonda Trotter is a 69 year old female who is being evaluated via a billable video visit.      The patient has been notified of following:     \"This video visit will be conducted via a call between you and your physician/provider. We have found that certain health care needs can be provided without the need for an in-person physical exam.  This service lets us provide the care you need with a video conversation.  If a prescription is necessary we can send it directly to your pharmacy.  If lab work is needed we can place an order for that and you can then stop by our lab to have the test done at a later time.    Video visits are billed at different rates depending on your insurance coverage.  Please reach out to your insurance provider with any questions.    If during the course of the call the physician/provider feels a video visit is not appropriate, you will not be charged for this service.\"    Patient has given verbal consent for Video visit?     How would you like to obtain your AVS?     Patient would like the video invitation sent by: Send to e-mail at: [email or web address]    Will anyone else be joining your video visit?     Video visit did not work, so was changed to a phone visit.         Subjective     Rhonda Trotter is a 69 year old female who presents to clinic today for the following health issues:    HPI   Pt spoke to triage yesterday and was feeling weakness, can't speak correctly, dizzy, confused   A couple weeks ago was having headaches, pt thought the headaches were related to \"tension\", she reports having watching the news a lot, thinking it was \"anxiety\" related. \"I was feeling something in my left arm and face\".    She denies any chest pain, shortness of breath, dizziness, weakness, slurred speech, drooping face.  Symptoms resolved this morning, did notice a bit of a headache that is returning as the day progresses.  She is doing more looking on her phone and ipad.   She has had some neck " pain, had been seeing her chiropractor but hasn't been able to see her lately.            Video Start Time: NA        Patient Active Problem List   Diagnosis     Hypothyroidism     HYPERLIPIDEMIA LDL GOAL <130     Squamous cell carcinoma in situ     Quevedo's neuroma     Degenerative arthritis of knee     Aftercare following knee joint replacement surgery, unspecified laterality     Anemia due to blood loss, acute     Advance care planning     Left knee pain     Other orthopedic aftercare(V54.89)     BMI of 40.0-44.9, adult (H)     Physical deconditioning     Benign essential hypertension     Right knee pain     Aftercare following right knee joint replacement surgery     Osteoporosis, unspecified osteoporosis type, unspecified pathological fracture presence     Closed compression fracture of thoracic vertebra, initial encounter (H)     Past Surgical History:   Procedure Laterality Date     ARTHROPLASTY KNEE Left 2/3/2016    Procedure: ARTHROPLASTY KNEE;  Surgeon: Abdiel Ledesma MD;  Location: [facility] OR     ARTHROPLASTY KNEE Right 10/11/2016    Procedure: ARTHROPLASTY KNEE;  Surgeon: Abdiel Ledesma MD;  Location:  OR     BIOPSY  August 2014    breast biopsy was negative     COLONOSCOPY  2013    every 5 years starting at age 52     orif Left     wrist     SURGICAL HISTORY OF -       wisdom teeth extraction       Social History     Tobacco Use     Smoking status: Never Smoker     Smokeless tobacco: Never Used   Substance Use Topics     Alcohol use: Yes     Comment: very light use     Family History   Problem Relation Age of Onset     C.A.D. Father         MI's x 2     Hypertension Father      Hyperlipidemia Father      Prostate Cancer Father      Cancer - colorectal Mother         diagnosed age 55     Heart Disease Mother         mi     Colon Cancer Mother      Obesity Mother      Circulatory Sister         aortic stenosis; Raynauds     Lipids Sister         on Lipitor     Respiratory Sister         pulmonary  "fibrosis     Hypertension Sister         lost wt and off bp meds     C.A.D. Brother          of MI     Heart Disease Brother 53        mi     Hypertension Brother      Respiratory Paternal Uncle         Tb     Heart Disease Paternal Uncle      Gynecology Daughter      Hypertension Sister      Hyperlipidemia Sister      Obesity Sister      Hypertension Brother      Diabetes No family hx of      Cerebrovascular Disease No family hx of      Breast Cancer No family hx of            Reviewed and updated as needed this visit by Provider         Review of Systems   ROS COMP: CONSTITUTIONAL: NEGATIVE for fever, chills, change in weight  ENT/MOUTH: NEGATIVE for ear, mouth and throat problems  RESP: NEGATIVE for significant cough or SOB  CV: NEGATIVE for chest pain, palpitations or peripheral edema  MUSCULOSKELETAL: see HPI  NEURO: see HPI  PSYCHIATRIC: see HPI      Objective    LMP 2004   Estimated body mass index is 46.26 kg/m  as calculated from the following:    Height as of 19: 1.651 m (5' 5\").    Weight as of 19: 126.1 kg (278 lb).  Physical Exam     GENERAL: healthy, alert and no distress  PSYCH: mentation appears normal, affect normal/bright, judgement and insight intact, normal speech and appearance well-groomed              Assessment & Plan     (F41.9) Anxiety  (primary encounter diagnosis)  Comment:   Plan: MENTAL HEALTH REFERRAL  - Adult; Outpatient         Treatment; Individual/Couples/Family/Group         Therapy/Health Psychology; [facility]: [facility] [phone]; We will         contact you to schedule the appointment or         please call with any questions        Discussed coping mechanisms listed below.  Will refer to a therapist.     (M54.2) Cervicalgia  Comment:   Plan: Discussed looking online for exercises.        Phone call time: 34 minutes    No follow-ups on file.    Corina Lara NP  [facility]      Acknowledge that feeling " "worried, anxious, sad during times of high stress is normal.    Focus on doing things you have control over.  Make a daily check list of actionable items that you can do that day, even include routine things such as take a shower, chores, etc.  This helps to keep your focus on the present and gives a sense of accomplishment as you cross things off the list.  Activities such as crafts, painting, knitting, etc also help to stay in the present and occupy our mind and hands.     It can be helpful to keep a journal, allowing yourself to write things that come in to your mind, worries and concerns, including thoughts that you might have when you wake up in the middle of the night and then close the journal.      Try to keep to a routine (this is certainly a \"new routine\") is also helpful as it gives a sense of security and structure to help feel more calm.  This should include a consistent bedtime and waking time as well time for daily exercise.    Exercise - get outside for exercise daily.  Also try to incorporate some exercise with mindfulness like yoga or Ronnie Chi.  There are videos online that are helpful.    Deep breathing, visualization, meditation can help calm the mind and the body's physical response to stress.  There are multiple apps that can help with this, check out this link:  https://adaa.org/finding-help/mobile-apps    Limit your exposure to the news.  Brief access to credible sources of the news is reasonable to stay informed, but otherwise watch more comedies or lighthearted shows or movies.  Read or listen to music.    Stay connected.  Even though we are physically distancing ourselves, it is important to stay connected with family and friends.  Text, call, video chat with others regularly.  Focus on others in need can be helpful as well.  Check in with others who may be struggling and see how you can help support them.      Phone visit time: 34 minutes      No follow-ups on file.       Coirna JOHN" Marco, NP

## 2020-04-24 ASSESSMENT — ANXIETY QUESTIONNAIRES: GAD7 TOTAL SCORE: 4

## 2020-05-08 ENCOUNTER — VIRTUAL VISIT (OUTPATIENT)
Dept: PSYCHOLOGY | Facility: CLINIC | Age: 70
End: 2020-05-08
Payer: MEDICARE

## 2020-05-08 DIAGNOSIS — F43.22 ADJUSTMENT DISORDER WITH ANXIETY: Primary | ICD-10-CM

## 2020-05-08 PROCEDURE — 90791 PSYCH DIAGNOSTIC EVALUATION: CPT | Mod: 95 | Performed by: SOCIAL WORKER

## 2020-05-08 ASSESSMENT — COLUMBIA-SUICIDE SEVERITY RATING SCALE - C-SSRS
TOTAL  NUMBER OF INTERRUPTED ATTEMPTS PAST 3 MONTHS: NO
ATTEMPT PAST THREE MONTHS: NO
ATTEMPT LIFETIME: NO
2. HAVE YOU ACTUALLY HAD ANY THOUGHTS OF KILLING YOURSELF LIFETIME?: NO
2. HAVE YOU ACTUALLY HAD ANY THOUGHTS OF KILLING YOURSELF?: NO
TOTAL  NUMBER OF INTERRUPTED ATTEMPTS LIFETIME: NO
TOTAL  NUMBER OF ABORTED OR SELF INTERRUPTED ATTEMPTS PAST 3 MONTHS: NO
6. HAVE YOU EVER DONE ANYTHING, STARTED TO DO ANYTHING, OR PREPARED TO DO ANYTHING TO END YOUR LIFE?: NO
1. IN THE PAST MONTH, HAVE YOU WISHED YOU WERE DEAD OR WISHED YOU COULD GO TO SLEEP AND NOT WAKE UP?: NO
6. HAVE YOU EVER DONE ANYTHING, STARTED TO DO ANYTHING, OR PREPARED TO DO ANYTHING TO END YOUR LIFE?: NO
1. IN THE PAST MONTH, HAVE YOU WISHED YOU WERE DEAD OR WISHED YOU COULD GO TO SLEEP AND NOT WAKE UP?: NO
TOTAL  NUMBER OF ABORTED OR SELF INTERRUPTED ATTEMPTS PAST LIFETIME: NO

## 2020-05-08 ASSESSMENT — ANXIETY QUESTIONNAIRES
GAD7 TOTAL SCORE: 4
3. WORRYING TOO MUCH ABOUT DIFFERENT THINGS: SEVERAL DAYS
6. BECOMING EASILY ANNOYED OR IRRITABLE: NOT AT ALL
2. NOT BEING ABLE TO STOP OR CONTROL WORRYING: SEVERAL DAYS
IF YOU CHECKED OFF ANY PROBLEMS ON THIS QUESTIONNAIRE, HOW DIFFICULT HAVE THESE PROBLEMS MADE IT FOR YOU TO DO YOUR WORK, TAKE CARE OF THINGS AT HOME, OR GET ALONG WITH OTHER PEOPLE: SOMEWHAT DIFFICULT
1. FEELING NERVOUS, ANXIOUS, OR ON EDGE: SEVERAL DAYS
7. FEELING AFRAID AS IF SOMETHING AWFUL MIGHT HAPPEN: SEVERAL DAYS
5. BEING SO RESTLESS THAT IT IS HARD TO SIT STILL: NOT AT ALL

## 2020-05-08 ASSESSMENT — PATIENT HEALTH QUESTIONNAIRE - PHQ9
5. POOR APPETITE OR OVEREATING: NOT AT ALL
SUM OF ALL RESPONSES TO PHQ QUESTIONS 1-9: 1

## 2020-05-08 NOTE — PROGRESS NOTES
"[facility]  Evaluator Name:  Andree Villalta     Credentials:  [facility]    PATIENT'S NAME: Rhonda Trotter  PREFERRED NAME: Gwyn  PREFERRED PRONOUNS:she, her, hers  MRN:   [id number]  :   1950   ACCT. NUMBER: [id number]  DATE OF SERVICE: 20  START TIME: 10:00am  END TIME: 11:00am  PREFERRED PHONE: [phone]  May we leave a program related message: Yes    STANDARD ADULT DIAGNOSTIC ASSESSMENT    Telemedicine Visit: The patient's condition can be safely assessed and treated via synchronous audio and visual telemedicine encounter.      Reason for Telemedicine Visit: Services only offered telehealth    Originating Site (Patient Location): Patient's home    Distant Site (Provider Location): Provider Remote Setting    Consent:  The patient/guardian has verbally consented to: the potential risks and benefits of telemedicine (video visit) versus in person care; bill my insurance or make self-payment for services provided; and responsibility for payment of non-covered services.     Mode of Communication:  Video Conference via Doxy    As the provider I attest to compliance with applicable laws and regulations related to telemedicine.    Identifying Information:  Patient is a 69 year old, .  The pronoun use throughout this assessment reflects the patient's chosen pronoun.  Patient was referred for an assessment by primary care provider.  Patient attended the session alone.     Chief Complaint:   The reason for seeking services at this time is: seeking support for anxiety during COVID-19. Patient reports in her life time experiencing \"episodes\" of physical sensations such as tingling in her arm, headaches and that when she has spoken with her doctors about them, they have been attributed to anxiety. Patient reported having one of these \"episodes\" a few weeks back, spoke with her doctor and was referred for counseling.    Patient reports observing increased worry regarding COVID-19, " reporting she worries about her health, the health of friends, the well being of health care workers, and concerns for what the world will look like in the next year. Patient reports she feels she is generally able to shake her worries, but in the last weeks experienced more distress and found it harder to return to a grounded, positive frame of mind. Patient reports frustration with herself that she experiences worries and does not put her trust in God to take care of things as she would like to be able to do.      The problem(s) began in the last few months as COVID-19 became more widespread. Patient has attempted to resolve these concerns in the past through talking with friends, reducing consumption of the news, getting exercise.    Does the client have any condition that is currently presenting as a potential to harm themselves or others (severe withdrawal, serious medical condition, severe emotional/behavioral problem)? No.  Proceed with assessment.    Social/Family History:  Patient reported they grew up in [facility] [place] [facility].  They were raised by biological parents.  Patient reports her parents were  until they passed. Patient reports she had one older brother and one older sister.   Patient reported that her childhood was good.  Patient reports she is the last living member of her immediate family.      The patient describes their cultural background as having a strong shahid, being Roman Catholic, and .  Cultural influences and impact on patient's life structure, values, norms, and healthcare: Spiritual Beliefs: strong shahid in God which she hopes will release her from worries and Health Beliefs and the endorsement of OR engagement in Culturally Specific Healing Practices: some stigma in accessing mental health services.  Contextual influences on patient's health include: Societal Factors COVID-19.    These factors will be addressed in the Preliminary Treatment plan.  Patient identified their  preferred language to be English. Patient reported they does not need the assistance of an  or other support involved in therapy.     Patient reported had no significant delays in developmental tasks.   Patient's highest education level was college graduate. Patient identified the following learning problems: none reported.  Modifications will not be used to assist communication in therapy.   Patient reports they are  able to understand written materials.    Patient reported the following relationship history single for most of her lifetime.  Patient identified their sexual orientation as heterosexual.  Patient reported having zero child(bethel).     Patient's current living/housing situation involves staying in own home/apartment.  They live alone and they report that housing is stable. Patient identified multiple friends as part of their support system.  Patient identified the quality of these relationships as good.      Patient is currently retired.  Patient reports their finances are obtained through [employer] income.  Patient does identify finances as a current stressor.      Patient reported that they have not been involved with the legal system.   Patient denies being on probation / parole / under the jurisdiction of the court.        Patient's Strengths and Limitations:  Patient identified the following strengths or resources that will help them succeed in treatment: Methodist / Mandaeism, commitment to health and well being, shahid / spirituality, friends / good social support, insight, intelligence, motivation, sense of humor and strong social skills. Things that may interfere with the patient's success in treatment include: None identified.   _______________________________________________  Personal and Family Medical History:   Patient did not report a family history of mental health concerns.  Patient reports family history includes C.A.D. in her brother and father; Cancer - colorectal in her  "mother; Circulatory in her sister; Colon Cancer in her mother; Gynecology in her daughter; Heart Disease in her mother and paternal uncle; Heart Disease (age of onset: 53) in her brother; Hyperlipidemia in her father and sister; Hypertension in her brother, brother, father, sister, and sister; Lipids in her sister; Obesity in her mother and sister; Prostate Cancer in her father; Respiratory in her paternal uncle and sister..     Patient reported the following previous diagnoses which include(s): an Anxiety Disorder.  Patient reported symptoms began once every several years for the last 20 years has experienced \"episodes\" of anxiety.   Patient has not received mental health services in the past: None.  Psychiatric Hospitalizations: None.  Patient denies a history of civil commitment.  Currently, patient is not receiving other mental health services.  These include none.   Patient has had a physical exam to rule out medical causes for current symptoms.  Date of last physical exam was within the past year. Client was encouraged to follow up with PCP if symptoms were to develop. The patient has a [place] Primary Care Provider, who is named Corina Lara.  Patient reports the following current medical concerns: Allergies.  There are significant appetite / nutritional concerns / weight changes. Patient reports she would like to lose weight.   Patient does not report a history of head injury / trauma / cognitive impairment.      Patient reports current meds as:   Outpatient Medications Marked as Taking for the 5/8/20 encounter (Virtual Visit) with Andree Villalta LICSW   Medication Sig     alendronate (FOSAMAX) 70 MG tablet Take 1 tablet (70 mg) by mouth with 8oz water every 7 days 30 minutes before breakfast and remain upright during this time.     aspirin EC 81 MG tablet Take 1 tablet (81 mg) by mouth daily     calcium carbonate (TUMS) 500 MG chewable tablet Take 1-2 chew tab by mouth daily as needed for " heartburn     Calcium-Phosphorus-Vitamin D (CALCIUM GUMMIES) 250-100-500 MG-MG-UNIT CHEW Take 2 tablets by mouth daily as needed     Cobalamine Combinations (B-12) 100-5000 MCG SUBL Place 1 tablet under the tongue daily as needed     diphenhydrAMINE (BENADRYL) 25 MG capsule Take 1 capsule (25 mg) by mouth nightly as needed for sleep     fluticasone (FLONASE) 50 MCG/ACT spray USE 2 SPRAYS IN EACH NOSTRIL DAILY     levothyroxine (SYNTHROID/LEVOTHROID) 100 MCG tablet Take 1 tablet (100 mcg) by mouth daily     lisinopril (PRINIVIL/ZESTRIL) 30 MG tablet Take 1 tablet (30 mg) by mouth daily     MAGNESIUM PO Take by mouth daily Magnesium 7     multivitamin, therapeutic with minerals (THERA-VIT-M) TABS Take 1 tablet by mouth daily     simvastatin (ZOCOR) 20 MG tablet Take 1 tablet (20 mg) by mouth At Bedtime       Medication Adherence:  Patient reports taking prescribed medications as prescribed.    Patient Allergies:    Allergies   Allergen Reactions     No Known Drug Allergies        Medical History:    Past Medical History:   Diagnosis Date     Arthritis      Obesity, unspecified      Pure hypercholesterolemia      Unspecified essential hypertension      Unspecified hypothyroidism          Current Mental Status Exam:   Appearance:  Appropriate    Eye Contact:  Good   Psychomotor:  Normal       Gait / station:  no problem  Attitude / Demeanor: Cooperative  Interested  Speech      Rate / Production: Normal/ Responsive      Volume:  Normal  volume      Language:  intact and no problems  Mood:   Anxious   Affect:   Worrisome    Thought Content: Clear   Thought Process: Goal Directed       Associations: No loosening of associations  Insight:   Good   Judgment:  Intact   Orientation:  All  Attention/concentration: Good    Rating Scales:    PHQ9:    PHQ-9 SCORE 4/23/2020   PHQ-9 Total Score 1   ;    GAD7:    DICK-7 SCORE 4/23/2020   Total Score 4     CGI:     First:Considering your total clinical experience with this particular  patient population, how severe are the patient's symptoms at this time?: 3 (2020 10:44 AM)       Substance Use:  Patient did not report a family history of substance use concerns; see medical history section for details.  Patient has not received chemical dependency treatment in the past.  Patient has not ever been to detox.      Patient is not currently receiving any chemical dependency treatment. Patient reported the following problems as a result of their substance use: None.    Patient denies using alcohol.  Patient denies using tobacco.  Patient denies using marijuana.  Patient denies using caffeine.  Patient reports using/abusing the following substance(s). Patient reported no other substance use.     CAGE- AID:    CAGE-AID Total Score 2020   Total Score 0       Substance Use: No symptoms    Based on the negative CAGE score and clinical interview there  are not indications of drug or alcohol abuse.      Significant Losses / Trauma / Abuse / Neglect Issues:   Patient did not serve in the .  There are indications or report of significant loss, trauma, abuse or neglect issues related to:  Death of family members: Mother  in , Father and brother  in , sister  4 years ago.  Loss: being single and childless, loss of job from [employer].    Concerns for possible neglect are not present.     Safety Assessment:   Current Safety Concerns:  [place] Suicide Severity Rating Scale (Lifetime/Recent)  [place] Suicide Severity Rating (Lifetime/Recent) 2020   1. Wish to be Dead (Lifetime) No   1. Wish to be Dead (Recent) No   2. Non-Specific Active Suicidal Thoughts (Lifetime) No   2. Non-Specific Active Suicidal Thoughts (Recent) No   Actual Attempt (Lifetime) No   Actual Attempt (Past 3 Months) No   Has subject engaged in non-suicidal self-injurious behavior? (Lifetime) No   Has subject engaged in non-suicidal self-injurious behavior? (Past 3 Months) No   Interrupted Attempts  (Lifetime) No   Interrupted Attempts (Past 3 Months) No   Aborted or Self-Interrupted Attempt (Lifetime) No   Aborted or Self-Interrupted Attempt (Past 3 Months) No   Preparatory Acts or Behavior (Lifetime) No   Preparatory Acts or Behavior (Past 3 Months) No     Patient denies current homicidal ideation and behaviors.  Patient denies current self-injurious ideation and behaviors.    Patient denied risk behaviors associated with substance use.  Patient denies any high risk behaviors associated with mental health symptoms.  Patient reports the following current concerns for their personal safety: None.  Patient reports there are no firearms in the house.    History of Safety Concerns:  Patient denied a history of homicidal ideation.     Patient denied a history of personal safety concerns.    Patient denied a history of assaultive behaviors.    Patient denied a history of assaultive behaviors.     Patient denied a history of risk behaviors associated with substance use.  Patient denies any history of high risk behaviors associated with mental health symptoms.  Patient reports the following protective factors: spirituality, positive relationships positive social network, forward/future oriented thinking, dedication to family/friends, safe and stable environment, regular physical activity, abstinence from substances, effective problem-solving skills, committment to well-being, sense of meaning and positive social skills    Risk Plan:  See Preliminary Treatment Plan for Safety and Risk Management Plan    Review of Symptoms per patient report:  Depression: No symptoms  Kemi:  No Symptoms  Psychosis: No Symptoms  Anxiety: Excessive worry, Nervousness, Physical complaints, such as headaches, stomachaches, muscle tension and Ruminations  Panic:  No symptoms  Post Traumatic Stress Disorder:  No Symptoms   Eating Disorder: No Symptoms  ADD / ADHD:  No symptoms  Conduct Disorder: No symptoms  Autism Spectrum Disorder: No  symptoms  Obsessive Compulsive Disorder: No Symptoms    Patient reports the following compulsive behaviors and treatment history: None.      Diagnostic Criteria:   A. The development of emotional or behavioral symptoms in response to an identifiable stressor(s) occurring within 3 months of the onset of the stressor(s)  B. These symptoms or behaviors are clinically significant, as evidenced by one or both of the following:       - Marked distress that is out of proportion to the severity/intensity of the stressor (with consideration for external context & culture)       - Significant impairment in social, occupational, or other important areas of functioning  C. The stress-related disturbance does not meet criteria for another disorder & is not not an exacerbation of another mental disorder  D. The symptoms do not represent normal bereavement  E. Once the stressor or its consequences have terminated, the symptoms do not persist for more than an additional 6 months       * Adjustment Disorder with Anxiety: The predominant manfestations are symptoms such as nervousness, worry, or jitteriness, or, in children separation anxiety from major attachment figures    Functional Status:  Patient reports the following functional impairments: management of the household and or completion of tasks and social interactions.     WHODAS:   WHODAS 2.0 Total Score 5/8/2020   Total Score 14       Clinical Summary:  1. Reason for assessment: Seeking support with anxiety due to COVID-19.  2. Psychosocial, Cultural and Contextual Factors: The patient describes their cultural background as having a strong shahid, being Amish, and .  Cultural influences and impact on patient's life structure, values, norms, and healthcare: Spiritual Beliefs: strong shahid in God which she hopes will release her from worries and Health Beliefs and the endorsement of OR engagement in Culturally Specific Healing Practices: some stigma in accessing  mental health services.  Contextual influences on patient's health include: Societal Factors COVID-19.   3. As evidenced by self report and criteria, client meets the following DSM5 Diagnoses:   (Sustained by DSM5 Criteria Listed Above)  Adjustment Disorders  309.24 (F43.22) With anxiety.  Other Diagnoses that is relevant to services: None.  4. R/O: None.   5. Provisional Diagnosis: None.  6. Prognosis: Relieve Acute Symptoms.  7. Likely consequences of symptoms if not treated: ongoing discomfort with anxiety and presentation of somatic symptoms of anxiety.  8. Client strengths include:  caring, creative, educated, insightful, intelligent, motivated, open to learning, wants to learn and willing to ask questions .     Recommendations:     1. Plan for Safety and Risk Management:Recommended that patient call 911 or go to the local ED should there be a change in any of these risk factors..  Report to child / adult protection services was NA.     2. Patient's identified cultural concerns will be addressed by discussing expereince in therapy, bringing in spiritual beliefs as patient wishes.     3. Initial Treatment will focus on: Anxiety - managing anxiety related to COVID-19.     4. Resources/Service Plan:       services are not indicated.     Modifications to assist communication are not indicated.     Additional disability accommodations are not indicated.      5. Collaboration:  Collaboration / coordination of treatment will be initiated with the following support professionals: None.      6.  Referrals: None.  The following referral(s) will be initiated: [facility]. Next Scheduled Appointment: 5/29/2020.  A Release of Information has been obtained for the following: N/A.    7. ALO: ALO:  Discussed the general effects of drugs and alcohol on health and well-being. Provider gave patient printed information about the effects of chemical use on their health and well being. Recommendations:  None.     8. Records were  not available for review at time of assessment.  Information in this assessment was obtained from the medical record and provided by patient who is a good historian.   Patient will have open access to their mental health medical record.      Eval type:  Mental Health    Staff Name/Credentials:  Andree Villalta  May 8, 2020

## 2020-05-08 NOTE — Clinical Note
Corina Betancourt,    Your patient presented to [facility] for a diagnostic evaluation today.  They will be beginning individual therapy with me.      Please do not hesitate to contact me if you have any questions in regards to Rhonda Trotter's care.        Andree Villalta, [facility]  May 8, 2020  [facility]

## 2020-05-08 NOTE — PATIENT INSTRUCTIONS
Ky Pascal,    It was nice to meet you this morning!    I've shared the website for a therapist in [place] who has written about why we're all so stressed out and tired now and some strategies for maintaining a health nervous system.    [email or web address]    I've also included the website of a group that has made a corona virus sanity guide that has some useful meditations, breathing exercises, and podcast episodes specific to dealing with anxiety about the corona virus.     Https://[email or web address].[email or web address]    I hope this is a good start. We can talk more in a few weeks about what's working and generate some additional ideas as well.    If you are interested in getting scheduled sooner or getting in touch with me, please call our business office at [phone].    Take care,  Andree

## 2020-05-09 ASSESSMENT — ANXIETY QUESTIONNAIRES: GAD7 TOTAL SCORE: 4

## 2020-05-26 ENCOUNTER — MYC MEDICAL ADVICE (OUTPATIENT)
Dept: FAMILY MEDICINE | Facility: CLINIC | Age: 70
End: 2020-05-26

## 2020-05-27 ENCOUNTER — MYC MEDICAL ADVICE (OUTPATIENT)
Dept: PSYCHOLOGY | Facility: CLINIC | Age: 70
End: 2020-05-27

## 2020-05-27 NOTE — TELEPHONE ENCOUNTER
Please review patient's mychart question & advise, thanks!    Merlene CORTEZ     [facility]

## 2020-05-29 ENCOUNTER — VIRTUAL VISIT (OUTPATIENT)
Dept: PSYCHOLOGY | Facility: CLINIC | Age: 70
End: 2020-05-29
Payer: MEDICARE

## 2020-05-29 DIAGNOSIS — F43.22 ADJUSTMENT DISORDER WITH ANXIETY: Primary | ICD-10-CM

## 2020-05-29 PROCEDURE — 90834 PSYTX W PT 45 MINUTES: CPT | Mod: 95 | Performed by: SOCIAL WORKER

## 2020-05-29 NOTE — PROGRESS NOTES
Progress Note    Patient Name: Rhonda Trotter  Date: 5/29/2020         Service Type: Individual      Session Start Time: 12:30  Session End Time: 1:15pm     Session Length: 45min    Session #: 2    Attendees: Client attended alone    Telemedicine Visit: The patient's condition can be safely assessed and treated via synchronous audio and visual telemedicine encounter.      Reason for Telemedicine Visit: Services only offered telehealth    Originating Site (Patient Location): Patient's home    Distant Site (Provider Location): Provider Remote Setting    Consent:  The patient/guardian has verbally consented to: the potential risks and benefits of telemedicine (video visit) versus in person care; bill my insurance or make self-payment for services provided; and responsibility for payment of non-covered services.      Treatment Plan Last Reviewed: 5/29/2020  PHQ 4/23/2020 5/8/2020   PHQ-9 Total Score 1 1   Q9: Thoughts of better off dead/self-harm past 2 weeks Not at all Not at all     DICK-7 SCORE 4/23/2020 5/8/2020   Total Score 4 4           DATA  Interactive Complexity: No  Crisis: No       Progress Since Last Session (Related to Symptoms / Goals / Homework):   Symptoms: No change Patient reports decreased anxiety regarding COVID-19, and increased anxiety regarding community events    Homework: Achieved / completed to satisfaction      Episode of Care Goals: Satisfactory progress - ACTION (Actively working towards change); Intervened by reinforcing change plan / affirming steps taken     Current / Ongoing Stressors and Concerns:   Pandemic, isolation, community events     Treatment Objective(s) Addressed in This Session:   Treatment planning     Intervention:   Treatment planning: Therapist gathered from patient her hopes from treatment. Therapist processed with patient the strategies that have been helpful in the last weeks and considered how she would like to continue  learning.   CBT/supportive therapy: Therapist supported patient to process thoughts and reactions regarding recent community events. Therapist provided reflection and normalization of patient's responses. Therapist supported patient to articulate feelings about the events. Patient considered how to reconnect with friends and build up her relational supports. Therapist considered with patient how to build a sense of safety and calm at home during the next days.        ASSESSMENT: Current Emotional / Mental Status (status of significant symptoms):   Risk status (Self / Other harm or suicidal ideation)   Patient denies current fears or concerns for personal safety.   Patient denies current or recent suicidal ideation or behaviors.   Patient denies current or recent homicidal ideation or behaviors.   Patient denies current or recent self injurious behavior or ideation.   Patient denies other safety concerns.   Patient reports there has been no change in risk factors since their last session.     Patient reports there has been no change in protective factors since their last session.     Recommended that patient call 911 or go to the local ED should there be a change in any of these risk factors.     Appearance:   Appropriate    Eye Contact:   Good    Psychomotor Behavior: Normal    Attitude:   Cooperative  Interested   Orientation:   All   Speech    Rate / Production: Normal     Volume:  Normal    Mood:    Anxious    Affect:    Worrisome    Thought Content:  Clear    Thought Form:  Coherent  Logical    Insight:    Good      Medication Review:   No current psychiatric medications prescribed     Medication Compliance:   NA     Changes in Health Issues:   None reported     Chemical Use Review:   Substance Use: Chemical use reviewed, no active concerns identified      Tobacco Use: No current tobacco use.      Diagnosis:  1. Adjustment disorder with anxiety        Collateral Reports Completed:   Not Applicable    PLAN:  (Patient Tasks / Therapist Tasks / Other)  Patient to reach out to 2 friends for social support.  Patient to review meditations and spiritual resources when needing to build calm time.        GRETEL Choi                                                         ______________________________________________________________________    Treatment Plan    Patient's Name: Rhonda Trotter  YOB: 1950    Date: 5/29/2020    DSM5 Diagnoses: Adjustment Disorders  309.24 (F43.22) With anxiety  Psychosocial / Contextual Factors: Pandemic, community events  WHODAS:   WHODAS 2.0 Total Score 5/8/2020   Total Score 14       Referral / Collaboration:  Referral to another professional/service is not indicated at this time..    Anticipated number of session or this episode of care: 4-6      MeasurableTreatment Goal(s) related to diagnosis / functional impairment(s)  Goal 1: Patient will decrease experience of anxiety by half as measured by the GAD7 from a score of     I will know I've met my goal when I've taken the time to use the skills and meet with others      Objective #A     Patient will learn and utilize 1-3 tools to manage anxiety and stress.  Status: New - Date: 5/29/2020     Intervention(s)  Therapist will teach patient mindfulness skills, and emotion regulation skills.    Objective #B  Patient will process 1-3 thoughts and feelings about stress and worry.  Status: New - Date: 5/29/2020     Intervention(s)  Therapist will provide CBT and supportive therapy.    Objective #C  Patient will take 1-3 steps towards self-care and caring for relational needs.  Status: New - Date: 5/29/2020     Intervention(s)  Therapist will provide motivational interviewing to support patient's goals of increasing movement and staying connected with others.        Patient has reviewed and agreed to the above plan.      GRETEL Choi  May 29, 2020

## 2020-05-30 NOTE — PATIENT INSTRUCTIONS
Ky Pascal,    Below you will see the goals for our work together as we discussed today. We can always make changes, so let me know if you'd like to see something different on here next time we talk.    Take care,  Andree        Treatment Plan    Patient's Name: Rhonda Trotter  YOB: 1950    Date: 5/29/2020    DSM5 Diagnoses: Adjustment Disorders  309.24 (F43.22) With anxiety  Psychosocial / Contextual Factors: Pandemic, community events  WHODAS:   WHODAS 2.0 Total Score 5/8/2020   Total Score 14       Referral / Collaboration:  Referral to another professional/service is not indicated at this time..    Anticipated number of session or this episode of care: 4-6      MeasurableTreatment Goal(s) related to diagnosis / functional impairment(s)  Goal 1: Patient will decrease experience of anxiety by half as measured by the GAD7 from a score of     I will know I've met my goal when I've taken the time to use the skills and meet with others      Objective #A     Patient will learn and utilize 1-3 tools to manage anxiety and stress.  Status: New - Date: 5/29/2020     Intervention(s)  Therapist will teach patient mindfulness skills, and emotion regulation skills.    Objective #B  Patient will process 1-3 thoughts and feelings about stress and worry.  Status: New - Date: 5/29/2020     Intervention(s)  Therapist will provide CBT and supportive therapy.    Objective #C  Patient will take 1-3 steps towards self-care and caring for relational needs.  Status: New - Date: 5/29/2020     Intervention(s)  Therapist will provide motivational interviewing to support patient's goals of increasing movement and staying connected with others.        Patient has reviewed and agreed to the above plan.      Andree Villalta, [facility]  May 29, 2020

## 2020-06-19 ENCOUNTER — VIRTUAL VISIT (OUTPATIENT)
Dept: PSYCHOLOGY | Facility: CLINIC | Age: 70
End: 2020-06-19
Payer: MEDICARE

## 2020-06-19 DIAGNOSIS — F43.22 ADJUSTMENT DISORDER WITH ANXIETY: Primary | ICD-10-CM

## 2020-06-19 PROCEDURE — 90834 PSYTX W PT 45 MINUTES: CPT | Mod: 95 | Performed by: SOCIAL WORKER

## 2020-06-19 NOTE — PROGRESS NOTES
Progress Note    Patient Name: Rhonda Trotter  Date: 6/19/2020         Service Type: Individual      Session Start Time: 11:31am  Session End Time: 12:20pm     Session Length: 49min    Session #: 3    Attendees: Client attended alone    Telemedicine Visit: The patient's condition can be safely assessed and treated via synchronous audio and visual telemedicine encounter.      Reason for Telemedicine Visit: Services only offered telehealth    Originating Site (Patient Location): Patient's home    Distant Site (Provider Location): Provider Remote Setting    Consent:  The patient/guardian has verbally consented to: the potential risks and benefits of telemedicine (video visit) versus in person care; bill my insurance or make self-payment for services provided; and responsibility for payment of non-covered services.     Mode of Communication: [employer] virtual visit     Treatment Plan Last Reviewed: 5/29/2020  PHQ 4/23/2020 5/8/2020   PHQ-9 Total Score 1 1   Q9: Thoughts of better off dead/self-harm past 2 weeks Not at all Not at all     DICK-7 SCORE 4/23/2020 5/8/2020   Total Score 4 4           DATA  Interactive Complexity: No  Crisis: No       Progress Since Last Session (Related to Symptoms / Goals / Homework):   Symptoms: Improving Patient reports feeling that her anxiety has decreased in the last weeks, reporting that she has not experienced the somatic symptoms that led her to pursue counseling    Homework: Achieved / completed to satisfaction      Episode of Care Goals: Satisfactory progress - ACTION (Actively working towards change); Intervened by reinforcing change plan / affirming steps taken     Current / Ongoing Stressors and Concerns:   Pandemic, isolation, community events     Treatment Objective(s) Addressed in This Session:      Patient will process 1-3 thoughts and feelings about stress and worry.     Intervention:      CBT/Supportive therapy: Patient  identified and reflected on the strategies she has used to be more regulated and managing anxiety. Therapist provided reflection on the importance of patient building in time for social connections as well as spiritual connections.   Patient processed recent stressors including how to navigate the current political and social climate. Patient presented worries about how to co-exist in relationships where beliefs might be different. Therapist engaged patient in identifying and reflecting her values and considered how she might take steps towards living in line with those values.      ASSESSMENT: Current Emotional / Mental Status (status of significant symptoms):   Risk status (Self / Other harm or suicidal ideation)   Patient denies current fears or concerns for personal safety.   Patient denies current or recent suicidal ideation or behaviors.   Patient denies current or recent homicidal ideation or behaviors.   Patient denies current or recent self injurious behavior or ideation.   Patient denies other safety concerns.   Patient reports there has been no change in risk factors since their last session.     Patient reports there has been no change in protective factors since their last session.     Recommended that patient call 911 or go to the local ED should there be a change in any of these risk factors.     Appearance:   Appropriate    Eye Contact:   Good    Psychomotor Behavior: Normal    Attitude:   Cooperative  Interested   Orientation:   All   Speech    Rate / Production: Normal     Volume:  Normal    Mood:    Anxious    Affect:    Restricted    Thought Content:  Clear    Thought Form:  Coherent  Logical    Insight:    Good      Medication Review:   No current psychiatric medications prescribed     Medication Compliance:   NA     Changes in Health Issues:   None reported     Chemical Use Review:   Substance Use: Chemical use reviewed, no active concerns identified      Tobacco Use: No current tobacco use.       Diagnosis:  1. Adjustment disorder with anxiety        Collateral Reports Completed:   Not Applicable    PLAN: (Patient Tasks / Therapist Tasks / Other)  Patient to reflect on her values and how/if she would like to convey those to others.        GRETEL Choi                                                         ______________________________________________________________________    Treatment Plan    Patient's Name: Rhonda Trotter  YOB: 1950    Date: 5/29/2020    DSM5 Diagnoses: Adjustment Disorders  309.24 (F43.22) With anxiety  Psychosocial / Contextual Factors: Pandemic, community events  WHODAS:   WHODAS 2.0 Total Score 5/8/2020   Total Score 14       Referral / Collaboration:  Referral to another professional/service is not indicated at this time..    Anticipated number of session or this episode of care: 4-6      MeasurableTreatment Goal(s) related to diagnosis / functional impairment(s)  Goal 1: Patient will decrease experience of anxiety by half as measured by the GAD7 from a score of     I will know I've met my goal when I've taken the time to use the skills and meet with others      Objective #A     Patient will learn and utilize 1-3 tools to manage anxiety and stress.  Status: New - Date: 5/29/2020     Intervention(s)  Therapist will teach patient mindfulness skills, and emotion regulation skills.    Objective #B  Patient will process 1-3 thoughts and feelings about stress and worry.  Status: New - Date: 5/29/2020     Intervention(s)  Therapist will provide CBT and supportive therapy.    Objective #C  Patient will take 1-3 steps towards self-care and caring for relational needs.  Status: New - Date: 5/29/2020     Intervention(s)  Therapist will provide motivational interviewing to support patient's goals of increasing movement and staying connected with others.        Patient has reviewed and agreed to the above plan.      GRETEL Choi  May 29, 2020

## 2020-07-10 ENCOUNTER — VIRTUAL VISIT (OUTPATIENT)
Dept: PSYCHOLOGY | Facility: CLINIC | Age: 70
End: 2020-07-10
Payer: MEDICARE

## 2020-07-10 DIAGNOSIS — F43.22 ADJUSTMENT DISORDER WITH ANXIETY: Primary | ICD-10-CM

## 2020-07-10 PROCEDURE — 90834 PSYTX W PT 45 MINUTES: CPT | Mod: 95 | Performed by: SOCIAL WORKER

## 2020-07-10 NOTE — PROGRESS NOTES
Discharge Summary  Multiple Sessions    Client Name: Rhonda Trotter MRN#: [id number] YOB: 1950    Discharge Date:   July 10, 2020    Service Modality: Video Visit:    Telemedicine Visit: The patient's condition can be safely assessed and treated via synchronous audio and visual telemedicine encounter.      Reason for Telemedicine Visit: Services only offered telehealth    Originating Site (Patient Location): Patient's home    Distant Site (Provider Location): Provider Remote Setting    Consent:  The patient/guardian has verbally consented to: the potential risks and benefits of telemedicine (video visit) versus in person care; bill my insurance or make self-payment for services provided; and responsibility for payment of non-covered services.     Patient would like the video invitation sent by: Send to e-mail at: [email or web address].}     Mode of Communication:  Video Conference via [employer]    As the provider I attest to compliance with applicable laws and regulations related to telemedicine.    Service Type: Individual      Session Start Time: 11:35am  Session End Time: 12:20pm      Session Length: 45 - 50     Session #: 4     Attendees: Client attended alone      Focus of Treatment Objective(s):  Client's presenting concerns included: Adjustment Difficulties related to: Pandemic and community events  Stage of Change at time of Discharge: MAINTENANCE (Working to maintain change, with risk of relapse)     Intervention:  Motivational interviewing/supportive therapy: Patient reflected on her recent moods and noted ongoing improvement compared to the first sessions. Patient reflected on the impact of the political climate and how this is impacting interpersonal relationships. Therapist provided reflection and containment. Therapist supported patient to clarify their values. Therapist considered various communication strategies with patient and considered her goals in  relationships.   Patient reflected her her therapy sessions, and noted feeling okay to terminate services. Patient is welcome to return in the future as needed.     Medication Adherence:  NA    Chemical Use:  NA    Assessment: Current Emotional / Mental Status (status of significant symptoms):    Risk status (Self / Other harm or suicidal ideation)  Client denies current fears or concerns for personal safety.  Client denies current or recent suicidal ideation or behaviors.  Client denies current or recent homicidal ideation or behaviors.  Client denies current or recent self injurious behavior or ideation.  Client denies other safety concerns.  A safety and risk management plan has not been developed at this time, however client was given the after-hours number should there be a change in any of these risk factors.    Appearance:   Appropriate   Eye Contact:   Good   Psychomotor Behavior: Normal   Attitude:   Cooperative  Pleasant  Orientation:   All  Speech   Rate / Production: Normal    Volume:  Normal   Mood:    Normal  Affect:    Appropriate  Bright   Thought Content:  Clear   Thought Form:  Coherent  Logical   Insight:   Good     DSM5 Diagnoses: (Sustained by DSM5 Criteria Listed Above)  Diagnoses: Adjustment Disorders  309.24 (F43.22) With anxiety  Psychosocial & Contextual Factors: Pandemic, community unrest  WHODAS 2.0 Total Score 5/8/2020   Total Score 14         Reason for Discharge:  Client is satisfied with progress and Goals completed      Aftercare Plan:  Client may resume counseling services at any time in the future by calling the [facility] Intake Office, [phone].      Andree Villalta, GRETEL  July 10, 2020

## 2020-11-09 ENCOUNTER — OFFICE VISIT (OUTPATIENT)
Dept: FAMILY MEDICINE | Facility: CLINIC | Age: 70
End: 2020-11-09
Payer: MEDICARE

## 2020-11-09 VITALS
RESPIRATION RATE: 20 BRPM | BODY MASS INDEX: 45.1 KG/M2 | HEART RATE: 74 BPM | TEMPERATURE: 98.6 F | DIASTOLIC BLOOD PRESSURE: 78 MMHG | SYSTOLIC BLOOD PRESSURE: 139 MMHG | WEIGHT: 271 LBS | OXYGEN SATURATION: 97 %

## 2020-11-09 DIAGNOSIS — E66.01 MORBID OBESITY WITH BMI OF 40.0-44.9, ADULT (H): ICD-10-CM

## 2020-11-09 DIAGNOSIS — I10 ESSENTIAL HYPERTENSION WITH GOAL BLOOD PRESSURE LESS THAN 140/90: ICD-10-CM

## 2020-11-09 DIAGNOSIS — E03.9 HYPOTHYROIDISM, UNSPECIFIED TYPE: ICD-10-CM

## 2020-11-09 DIAGNOSIS — Z00.00 ENCOUNTER FOR MEDICARE ANNUAL WELLNESS EXAM: Primary | ICD-10-CM

## 2020-11-09 DIAGNOSIS — E78.5 HYPERLIPIDEMIA LDL GOAL <130: ICD-10-CM

## 2020-11-09 DIAGNOSIS — M81.0 OSTEOPOROSIS, UNSPECIFIED OSTEOPOROSIS TYPE, UNSPECIFIED PATHOLOGICAL FRACTURE PRESENCE: ICD-10-CM

## 2020-11-09 LAB
ANION GAP SERPL CALCULATED.3IONS-SCNC: 4 MMOL/L (ref 3–14)
BUN SERPL-MCNC: 11 MG/DL (ref 7–30)
CALCIUM SERPL-MCNC: 9.3 MG/DL (ref 8.5–10.1)
CHLORIDE SERPL-SCNC: 101 MMOL/L (ref 94–109)
CHOLEST SERPL-MCNC: 190 MG/DL
CO2 SERPL-SCNC: 28 MMOL/L (ref 20–32)
CREAT SERPL-MCNC: 0.79 MG/DL (ref 0.52–1.04)
ERYTHROCYTE [DISTWIDTH] IN BLOOD BY AUTOMATED COUNT: 13.6 % (ref 10–15)
GFR SERPL CREATININE-BSD FRML MDRD: 77 ML/MIN/{1.73_M2}
GLUCOSE SERPL-MCNC: 96 MG/DL (ref 70–99)
HCT VFR BLD AUTO: 41.5 % (ref 35–47)
HDLC SERPL-MCNC: 78 MG/DL
HGB BLD-MCNC: 13.8 G/DL (ref 11.7–15.7)
LDLC SERPL CALC-MCNC: 89 MG/DL
MCH RBC QN AUTO: 32.3 PG (ref 26.5–33)
MCHC RBC AUTO-ENTMCNC: 33.3 G/DL (ref 31.5–36.5)
MCV RBC AUTO: 97 FL (ref 78–100)
NONHDLC SERPL-MCNC: 112 MG/DL
PLATELET # BLD AUTO: 220 10E9/L (ref 150–450)
POTASSIUM SERPL-SCNC: 4.6 MMOL/L (ref 3.4–5.3)
RBC # BLD AUTO: 4.27 10E12/L (ref 3.8–5.2)
SODIUM SERPL-SCNC: 133 MMOL/L (ref 133–144)
TRIGL SERPL-MCNC: 114 MG/DL
TSH SERPL DL<=0.005 MIU/L-ACNC: 2.36 MU/L (ref 0.4–4)
WBC # BLD AUTO: 6 10E9/L (ref 4–11)

## 2020-11-09 PROCEDURE — 80048 BASIC METABOLIC PNL TOTAL CA: CPT | Performed by: NURSE PRACTITIONER

## 2020-11-09 PROCEDURE — 85027 COMPLETE CBC AUTOMATED: CPT | Performed by: NURSE PRACTITIONER

## 2020-11-09 PROCEDURE — 84443 ASSAY THYROID STIM HORMONE: CPT | Performed by: NURSE PRACTITIONER

## 2020-11-09 PROCEDURE — G0439 PPPS, SUBSEQ VISIT: HCPCS | Performed by: NURSE PRACTITIONER

## 2020-11-09 PROCEDURE — 99214 OFFICE O/P EST MOD 30 MIN: CPT | Mod: 25 | Performed by: NURSE PRACTITIONER

## 2020-11-09 PROCEDURE — 36415 COLL VENOUS BLD VENIPUNCTURE: CPT | Performed by: NURSE PRACTITIONER

## 2020-11-09 PROCEDURE — 80061 LIPID PANEL: CPT | Performed by: NURSE PRACTITIONER

## 2020-11-09 RX ORDER — SIMVASTATIN 20 MG
20 TABLET ORAL AT BEDTIME
Qty: 90 TABLET | Refills: 3 | Status: SHIPPED | OUTPATIENT
Start: 2020-11-09 | End: 2021-10-20

## 2020-11-09 RX ORDER — LEVOTHYROXINE SODIUM 100 UG/1
100 TABLET ORAL DAILY
Qty: 90 TABLET | Refills: 3 | Status: SHIPPED | OUTPATIENT
Start: 2020-11-09 | End: 2021-10-20

## 2020-11-09 RX ORDER — LISINOPRIL 30 MG/1
30 TABLET ORAL DAILY
Qty: 90 TABLET | Refills: 3 | Status: SHIPPED | OUTPATIENT
Start: 2020-11-09 | End: 2021-10-20

## 2020-11-09 RX ORDER — ALENDRONATE SODIUM 70 MG/1
TABLET ORAL
Qty: 12 TABLET | Refills: 3 | Status: SHIPPED | OUTPATIENT
Start: 2020-11-09 | End: 2021-10-20

## 2020-11-09 NOTE — PROGRESS NOTES
"  SUBJECTIVE:   Rhonda Trotter is a 70 year old female who presents for Preventive Visit.    {Split Bill scripting  The purpose of this visit is to discuss your medical history and prevent health problems before you are sick. You may be responsible for a co-pay, coinsurance, or deductible if your visit today includes services such as checking on a sore throat, having an x-ray or lab test, or treating and evaluating a new or existing condition     Patient has been advised of split billing requirements and indicates understanding: Yes     Are you in the first 12 months of your Medicare Part B coverage?  No    Physical Health:    In general, how would you rate your overall physical health? good    Outside of work, how many days during the week do you exercise? Over the summer walking    Outside of work, approximately how many minutes a day do you exercise?N/A    If you drink alcohol do you typically have >3 drinks per day or >7 drinks per week? No    Do you usually eat at least 4 servings of fruit and vegetables a day, include whole grains & fiber and avoid regularly eating high fat or \"junk\" foods? At least 2 servings     Do you have any problems taking medications regularly?  No    Do you have any side effects from medications? none    Needs assistance for the following daily activities: no assistance needed    Which of the following safety concerns are present in your home?  none identified     Hearing impairment: ringing in ears     In the past 6 months, have you been bothered by leaking of urine? Yes. Trying to drink less water in the evening. Wears pads     Mental Health:    In general, how would you rate your overall mental or emotional health? good  PHQ-2 Score:      Do you feel safe in your environment? Yes    Have you ever done Advance Care Planning? (For example, a Health Directive, POLST, or a discussion with a medical provider or your loved ones about your wishes): Yes, advance care planning is on " file.    Additional concerns to address?  YES. Stools has been floating. Chiropractor gave pt recommendation to take [employer] Digestive enzymes Plus prebiotic & probiotic supplement     Fall risk:  Fallen 2 or more times in the past year?: No  Any fall with injury in the past year?: No    Cognitive Screenin) Repeat 3 items (Leader, Season, Table)    2) Clock draw: NORMAL  3) 3 item recall: Recalls 3 objects  Results: 3 items recalled: COGNITIVE IMPAIRMENT LESS LIKELY    Mini-CogTM Copyright YARI Hernandez. Licensed by the author for use in [facility] [employer]; reprinted with permission (hanny@[facility]). All rights reserved.      Do you have sleep apnea, excessive snoring or daytime drowsiness?: no: snoring     She is doing well on Fosamax.  Last DEXA was 2 years ago.    She is doing well on her current blood pressure medications and denies any side effects.  Her blood pressure has been controlled.    She denies any symptoms such as fatigue; changes in weight; temperature intolerance; skin changes; constipation or loose stools.    Her anxiety level is improved.  She is seeing a therapist.     She continues to have some urinary incontinence, especially if she waits too long to urinate or if she sneezes.  She does think symptoms improve if she does kegels.    She has been having tinnitus for years.  She is wondering about a hearing test.     She will have intermittent right knee pain.  It can be bothersome at times with standing or getting in or out of a car at times.  She does see a chiropractor.   She has had bilateral knee replacements.     Occasionally her stools will float.          Reviewed and updated as needed this visit by clinical staff  Tobacco  Allergies  Meds              Reviewed and updated as needed this visit by Provider                Social History     Tobacco Use     Smoking status: Never Smoker     Smokeless tobacco: Never Used   Substance Use Topics     Alcohol use: Yes     Comment:  "very light use                           Current providers sharing in care for this patient include:   Patient Care Team:  Corina Lara NP as PCP - General  Corina Lara NP as Assigned PCP    The following health maintenance items are reviewed in Epic and correct as of today:  Health Maintenance   Topic Date Due     TSH W/FREE T4 REFLEX  11/13/2020     FALL RISK ASSESSMENT  11/13/2020     MAMMO SCREENING  11/28/2020     BMP  01/22/2021     Pneumococcal Vaccine: 65+ Years (2 of 2 - PPSV23) 02/07/2021     DTAP/TDAP/TD IMMUNIZATION (3 - Td) 05/06/2021     MEDICARE ANNUAL WELLNESS VISIT  11/09/2021     COLORECTAL CANCER SCREENING  05/03/2023     LIPID  11/13/2024     ADVANCE CARE PLANNING  11/13/2024     DEXA  Completed     HEPATITIS C SCREENING  Completed     PHQ-2  Completed     INFLUENZA VACCINE  Completed     ZOSTER IMMUNIZATION  Completed     Pneumococcal Vaccine: Pediatrics (0 to 5 Years) and At-Risk Patients (6 to 64 Years)  Aged Out     IPV IMMUNIZATION  Aged Out     MENINGITIS IMMUNIZATION  Aged Out     HEPATITIS B IMMUNIZATION  Aged Out           ROS:  CONSTITUTIONAL: NEGATIVE for fever, chills, change in weight  ENT/MOUTH: NEGATIVE for ear, mouth and throat problems  RESP: NEGATIVE for significant cough or SOB  CV: NEGATIVE for chest pain, palpitations or peripheral edema  GI: NEGATIVE for nausea, abdominal pain, heartburn, or change in bowel habits  MUSCULOSKELETAL: NEGATIVE for significant arthralgias or myalgia  NEURO: NEGATIVE for weakness, dizziness or paresthesias  ENDOCRINE: NEGATIVE for temperature intolerance, skin/hair changes  PSYCHIATRIC: NEGATIVE for changes in mood or affect    OBJECTIVE:   /78   Pulse 74   Temp 98.6  F (37  C) (Tympanic)   Resp 20   Wt 122.9 kg (271 lb)   LMP 08/01/2004   SpO2 97%   BMI 45.10 kg/m   Estimated body mass index is 45.1 kg/m  as calculated from the following:    Height as of 11/13/19: 1.651 m (5' 5\").    Weight as of this encounter: 122.9 " kg (271 lb).  EXAM:   GENERAL: healthy, alert and no distress  EYES: Eyes grossly normal to inspection, PERRL and conjunctivae and sclerae normal  HENT: ear canals and TM's normal, nose and mouth without ulcers or lesions  NECK: no adenopathy, no asymmetry, masses, or scars and thyroid normal to palpation  RESP: lungs clear to auscultation - no rales, rhonchi or wheezes  BREAST: normal without masses, tenderness or nipple discharge and no palpable axillary masses or adenopathy  CV: regular rate and rhythm, normal S1 S2, no S3 or S4, no murmur, click or rub, no peripheral edema and peripheral pulses strong  ABDOMEN: soft, nontender, no hepatosplenomegaly, no masses and bowel sounds normal  MS: no gross musculoskeletal defects noted, no edema  SKIN: no suspicious lesions or rashes  NEURO: Normal strength and tone, mentation intact and speech normal  PSYCH: mentation appears normal, affect normal/bright        ASSESSMENT / PLAN:   (Z00.00) Encounter for Medicare annual wellness exam  (primary encounter diagnosis)  Comment:   Plan: Get Td in the spring.     (E03.9) Hypothyroidism, unspecified type  Comment:   Plan: levothyroxine (SYNTHROID/LEVOTHROID) 100 MCG         tablet, TSH with free T4 reflex        The current medical regimen is effective;  continue present plan and medications.     (I10) Essential hypertension with goal blood pressure less than 140/90  Comment: at goal  Plan: lisinopril (ZESTRIL) 30 MG tablet, Basic         metabolic panel  (Ca, Cl, CO2, Creat, Gluc, K,         Na, BUN)        The current medical regimen is effective;  continue present plan and medications.     (E78.5) Hyperlipidemia LDL goal <130  Comment:   Plan: simvastatin (ZOCOR) 20 MG tablet, Lipid panel         reflex to direct LDL Fasting        The current medical regimen is effective;  continue present plan and medications.     (M81.0) Osteoporosis, unspecified osteoporosis type, unspecified pathological fracture presence  Comment:  "  Plan: alendronate (FOSAMAX) 70 MG tablet, DX         Hip/Pelvis/Spine        Will recheck a DEXA.       Patient has been advised of split billing requirements and indicates understanding: Yes    COUNSELING:  Reviewed preventive health counseling, as reflected in patient instructions    Estimated body mass index is 45.1 kg/m  as calculated from the following:    Height as of 11/13/19: 1.651 m (5' 5\").    Weight as of this encounter: 122.9 kg (271 lb).    Weight management plan: Discussed healthy diet and exercise guidelines    She reports that she has never smoked. She has never used smokeless tobacco.    Appropriate preventive services were discussed with this patient, including applicable screening as appropriate for cardiovascular disease, diabetes, osteopenia/osteoporosis, and glaucoma.  As appropriate for age/gender, discussed screening for colorectal cancer, prostate cancer, breast cancer, and cervical cancer. Checklist reviewing preventive services available has been given to the patient.    Reviewed patients plan of care and provided an AVS. The Basic Care Plan (routine screening as documented in Health Maintenance) for Rhonda meets the Care Plan requirement. This Care Plan has been established and reviewed with the Patient.    Counseling Resources:  ATP IV Guidelines  Pooled Cohorts Equation Calculator  Breast Cancer Risk Calculator  BRCA-Related Cancer Risk Assessment: FHS-7 Tool  FRAX Risk Assessment  ICSI Preventive Guidelines  Dietary Guidelines for Americans, 2010  USDA's MyPlate  ASA Prophylaxis  Lung CA Screening    Corina Lara NP  [facility]  "

## 2020-12-09 ENCOUNTER — HOSPITAL ENCOUNTER (OUTPATIENT)
Dept: MAMMOGRAPHY | Facility: CLINIC | Age: 70
Discharge: HOME | End: 2020-12-09
Attending: NURSE PRACTITIONER | Admitting: NURSE PRACTITIONER
Payer: MEDICARE

## 2020-12-09 DIAGNOSIS — Z12.31 SCREENING MAMMOGRAM, ENCOUNTER FOR: ICD-10-CM

## 2020-12-09 PROCEDURE — 77067 SCR MAMMO BI INCL CAD: CPT

## 2021-01-30 ENCOUNTER — MYC MEDICAL ADVICE (OUTPATIENT)
Dept: FAMILY MEDICINE | Facility: CLINIC | Age: 71
End: 2021-01-30

## 2021-03-24 ENCOUNTER — NURSE TRIAGE (OUTPATIENT)
Dept: NURSING | Facility: CLINIC | Age: 71
End: 2021-03-24

## 2021-03-24 NOTE — TELEPHONE ENCOUNTER
2nd Covid vaccine tomorrow through [place] [facility] - has had a nagging headache past couple of days. Is also having some aches and stuff. No fever. States she's not really feeling bad - took some tylenol and it did help yesterday. Thinks her headache may be related to a new pillow.     Reviewed CDC guidelines with patient - patient will monitor symptoms and if feels same or better proceed with vaccine.    Agnes Mansfield RN on 3/24/2021 at 4:25 PM    Reason for Disposition    COVID-19 vaccine, Frequently Asked Questions (FAQs)    Additional Information    Negative: [1] Difficulty breathing or swallowing AND [2] starts within 2 hours after injection    Negative: Sounds like a life-threatening emergency to the triager    Negative: [1] COVID-19 exposure AND [2] no symptoms    Negative: [1] Typical COVID-19 symptoms AND [2] symptoms that are NOT expected from vaccine (e.g., cough, difficulty breathing, loss of taste or smell, runny nose, sore throat)    Negative: [1] Typical COVID-19 symptoms AND [2] started > 3 days after getting vaccine    Negative: Fever > 104 F (40 C)    Negative: Sounds like a severe, unusual reaction to the triager    Negative: [1] Redness or red streak around the injection site AND [2] started > 48 hours after getting vaccine AND [3] fever    Negative: [1] Fever > 101 F (38.3 C) AND [2] age > 60 AND [3] started > 48 hours after getting vaccine    Negative: [1] Fever > 100.0 F (37.8 C) AND [2] bedridden (e.g., nursing home patient, CVA, chronic illness, recovering from surgery) AND [3] started > 48 hours after getting vaccine    Negative: [1] Fever > 100.0 F (37.8 C) AND [2] diabetes mellitus or weak immune system (e.g., HIV positive, cancer chemo, splenectomy, organ transplant, chronic steroids) AND [3] started > 48 hours after getting vaccine    Negative: [1] Redness or red streak around the injection site AND [2] started > 48 hours after getting vaccine AND [3] no fever  (Exception: red area < 1  inch or 2.5 cm wide)    Negative: [1] Pain, tenderness, or swelling at the injection site AND [2] over 3 days (72 hours) since vaccine AND [3] getting worse    Negative: Fever > 100.0 F (37.8 C) present > 3 days (72 hours)    Negative: [1] Fever > 100.0 F (37.8 C) AND [2] healthcare worker    Negative: [1] Pain, tenderness, or swelling at the injection site AND [2] lasts > 7 days    Negative: [1] Requesting COVID-19 vaccine AND [2] healthcare worker (e.g., EMS first responders, doctors, nurses)    Negative: [1] Requesting COVID-19 vaccine AND [2] resident of a long-term care facility (e.g., nursing home)    Negative: [1] Requesting COVID-19 vaccine AND [2] vaccine available in the community for this patient group    Negative: COVID-19 vaccine, injection site reaction (e.g., pain, redness, swelling), question about    Negative: COVID-19 vaccine, systemic reactions (e.g., fatigue, fever, muscle aches), questions about    Protocols used: CORONAVIRUS (COVID-19) VACCINE QUESTIONS AND [id number] 1.3

## 2021-07-20 ENCOUNTER — MYC MEDICAL ADVICE (OUTPATIENT)
Dept: FAMILY MEDICINE | Facility: CLINIC | Age: 71
End: 2021-07-20

## 2021-07-20 DIAGNOSIS — G57.60 MORTON'S NEUROMA: Primary | ICD-10-CM

## 2021-07-20 NOTE — TELEPHONE ENCOUNTER
"Erica,    Painful feet, hx neuroma and also \"pain and stiffness in both my feet in the metatarsal arch area\"    She is asking for referral to podiatry or ortho, whichever you advise    Merle Ureña RN, BSN  [facility]       "

## 2021-08-13 ENCOUNTER — OFFICE VISIT (OUTPATIENT)
Dept: PODIATRY | Facility: CLINIC | Age: 71
End: 2021-08-13
Payer: MEDICARE

## 2021-08-13 VITALS
BODY MASS INDEX: 43.71 KG/M2 | SYSTOLIC BLOOD PRESSURE: 130 MMHG | DIASTOLIC BLOOD PRESSURE: 74 MMHG | HEIGHT: 66 IN | WEIGHT: 272 LBS

## 2021-08-13 DIAGNOSIS — M79.671 FOOT PAIN, BILATERAL: Primary | ICD-10-CM

## 2021-08-13 DIAGNOSIS — M77.52 CAPSULITIS OF METATARSOPHALANGEAL (MTP) JOINTS OF BOTH FEET: ICD-10-CM

## 2021-08-13 DIAGNOSIS — M77.51 CAPSULITIS OF METATARSOPHALANGEAL (MTP) JOINTS OF BOTH FEET: ICD-10-CM

## 2021-08-13 DIAGNOSIS — M20.41 HAMMER TOES OF BOTH FEET: ICD-10-CM

## 2021-08-13 DIAGNOSIS — M79.672 FOOT PAIN, BILATERAL: Primary | ICD-10-CM

## 2021-08-13 DIAGNOSIS — M20.42 HAMMER TOES OF BOTH FEET: ICD-10-CM

## 2021-08-13 PROCEDURE — 99203 OFFICE O/P NEW LOW 30 MIN: CPT | Performed by: PODIATRIST

## 2021-08-13 ASSESSMENT — MIFFLIN-ST. JEOR: SCORE: 1775.53

## 2021-08-13 NOTE — PATIENT INSTRUCTIONS
Thank you for choosing [facility] Podiatry / Foot & Ankle Surgery!    DR. FARLEY'S CLINIC:  [facility]   [id number]   [place] [address]   [phone]  [phone]      SCHEDULE SURGERY: [phone]   BILLING QUESTIONS: [phone]   APPOINTMENTS: [phone]   CONSUMER PRICE LINE: [phone]      Follow up: as needed    Next steps: over the counter voltaren gel    HAMMERTOES  Hammertoe is a contracture (bending) of one or both joints of the second, third, fourth, or fifth (little) toes. This abnormal bending can put pressure on the toe when wearing shoes, causing problems to develop.  Hammertoes usually start out as mild deformities and get progressively worse over time. In the earlier stages, hammertoes are flexible and the symptoms can often be managed with noninvasive measures. But if left untreated, hammertoes can become more rigid and will not respond to non-surgical treatment.  Because of the progressive nature of hammertoes, they should receive early attention. Hammertoes never get better without some kind of intervention.  CAUSES  The most common cause of hammertoe is a muscle/tendon imbalance. This imbalance, which leads to a bending of the toe, results from mechanical (structural) changes in the foot that occur over time in some people.  Hammertoes may be aggravated by shoes that don t fit properly. A hammertoe may result if a toe is too long and is forced into a cramped position when a tight shoe is worn.  Occasionally, hammertoe is the result of an earlier trauma to the toe. In some people, hammertoes are inherited.  SYMPTOMS  Pain or irritation of the affected toe when wearing shoes.   Corns and calluses (a buildup of skin) on the toe, between two toes, or on the ball of the foot. Corns are caused by constant friction against the shoe. They may be soft or hard, depending upon their location.   Inflammation, redness, or a burning sensation   Contracture of  the toe   In more severe cases of hammertoe, open sores may form.   DIAGNOSIS  Although hammertoes are readily apparent, to arrive at a diagnosis the foot and ankle surgeon will obtain a thorough history of your symptoms and examine your foot. During the physical examination, the doctor may attempt to reproduce your symptoms by manipulating your foot and will study the contractures of the toes. In addition, the foot and ankle surgeon may take x-rays to determine the degree of the deformities and assess any changes that may have occurred.   Hammertoes are progressive - they don t go away by themselves and usually they will get worse over time. However, not all cases are alike - some hammertoes progress more rapidly than others. Once your foot and ankle surgeon has evaluated your hammertoes, a treatment plan can be developed that is suited to your needs.  NON-SURGICAL TREATMENT  There is a variety of treatment options for hammertoe. The treatment your foot and ankle surgeon selects will depend upon the severity of your hammertoe and other factors.  A number of non-surgical measures can be undertaken:  Padding corns and calluses. Your foot and ankle surgeon can provide or prescribe pads designed to shield corns from irritation. If you want to try over-the-counter pads, avoid the medicated types. Medicated pads are generally not recommended because they may contain a small amount of acid that can be harmful. Consult your surgeon about this option.   Changes in shoewear. Avoid shoes with pointed toes, shoes that are too short, or shoes with high heels - conditions that can force your toe against the front of the shoe. Instead, choose comfortable shoes with a deep, roomy toe box and heels no higher than two inches.   Orthotic devices. A custom orthotic device placed in your shoe may help control the muscle/tendon imbalance.   Injection therapy. Corticosteroid injections are sometimes used to ease pain and inflammation  caused by hammertoe.   Medications. Oral nonsteroidal anti-inflammatory drugs (NSAIDs), such as ibuprofen, may be recommended to reduce pain and inflammation.   Splinting/strapping. Splints or small straps may be applied by the surgeon to realign the bent toe.   Exercises:   1. The Toe Tap  Stand flat on the ground in your bare feet. Raise all of your toes up off the ground as high as you can. Then starting with the little toes, slowly press them down to the ground. After the big toes are on the ground, start over by raising all of them up off the ground again. This motion is similar to tapping your fingers on a desk. Repeat this ten times.     2. Interlocking your Fingers and Toes  Cross your right foot over your knee and place the fingers of your left hand between your toes. Squeeze your toes together, pinching your fingers between them. Spread the toes apart and squeeze them together again. Repeat this ten times then do the other foot. Like most exercises, this will get easier the more you do it. If you are having a lot of difficulty with this exercise, start with just your index finger between your first and second toe, then later add your middle finger between your second and third toes, and so on until you can fit all your fingers between your toes. Do this ten times on each foot. Eventually you will be able to spread your toes apart without using your fingers.    3. Gripping the Floor   the floor by pressing the pads of your toes (not the tips) into the floor without curling your toes. Relax and repeat this ten times. If your shoes have the proper amount of depth, you should be able to do this with shoes on.    HAMMERTOE TOE SURGERY   Hammertoe surgery is complex. The surgical procedure is an attempt to help the toe lay in a better position. Nearly every structure in the toe will be cut including the tendons, ligaments, skin and bone. Hammertoes are a complex deformity and final toe position is difficult  to predict. The only sure way to position a toe is to fuse all three digital joints. That will not happen as some degree of toe motion is needed for walking. The toe may not be completely reduced as the surrounding skin and other structures may not allow the toe to return to a normal position. The tendons on adjacent toes may need to be cut at the time of the original or subsequent surgeries, as interconnections exist between the toes. The toe may drift after surgery. Stiffness may develop leading to new areas of pressure.   Future shoe choices will be critical in allowing the surgery to provide comfort. The toes will still hurt if shoes rub. The original pain may also persist as other foot problems may be contributing to the current pain. The toe may or may not be pinned in place. External pins would require complete avoidance of water on the foot for six weeks. The pin would be removed about six weeks after the surgery. Strict attention to protection is critical. The pin could get bumped or loosen resulting in early removal. Removal might be necessary before the bone heals which would negatively affect the final surgical outcome and toe allignment.       OSTEOARTHRITIS OF THE FOOT & ANKLE  Osteoarthritis is a condition characterized by the breakdown and eventual loss of cartilage in one or more joints. Cartilage (the connective tissue found at the end of the bones in the joints) protects and cushions the bones during movement. When cartilage deteriorates or is lost, symptoms develop that can restrict one s ability to easily perform daily activities.  Osteoarthritis is also known as degenerative arthritis, reflecting its nature to develop as part of the aging process. As the most common form of arthritis, osteoarthritis affects millions of Americans. Some people refer to osteoarthritis simply as arthritis, even though there are many different types of arthritis.  Osteoarthritis appears at various joints throughout  the body, including the hands, feet, spine, hips, and knees. In the foot, the disease most frequently occurs in the big toe, although it is also often found in the midfoot and ankle.  CAUSES  Osteoarthritis is considered a  wear and tear  disease because the cartilage in the joint wears down with repeated stress and use over time. As the cartilage deteriorates and gets thinner, the bones lose their protective covering and eventually may rub together, causing pain and inflammation of the joint.  An injury may also lead to osteoarthritis, although it may take months or years after the injury for the condition to develop. For example, osteoarthritis in the big toe is often caused by kicking or jamming the toe, or by dropping something on the toe. Osteoarthritis in the midfoot is often caused by dropping something on it, or by a sprain or fracture. In the ankle, osteoarthritis is usually caused by a fracture and occasionally by a severe sprain.  Sometimes osteoarthritis develops as a result of abnormal foot mechanics such as flat feet or high arches. A flat foot causes less stability in the ligaments (bands of tissue that connect bones), resulting in excessive strain on the joints, which can cause arthritis. A high arch is rigid and lacks mobility, causing a jamming of joints that creates an increased risk of arthritis.  SYMPTOMS  People with osteoarthritis in the foot or ankle experience, in varying degrees, one or more of the following: Pain and stiffness in the joint, swelling in or near the joint, or difficulty walking or bending the joint.   Some patients with osteoarthritis also develop a bone spur (a bony protrusion) at the affected joint. Shoe pressure may cause pain at the site of a bone spur, and in some cases blisters or calluses may form over its surface. Bone spurs can also limit the movement of the joint.    DIAGNOSIS  In diagnosing osteoarthritis, the foot and ankle surgeon will examine the foot  thoroughly, looking for swelling in the joint, limited mobility, and pain with movement. In some cases, deformity and/or enlargement (spur) of the joint may be noted. X-rays may be ordered to evaluate the extent of the disease.  NON-SURGICAL TREATMENT  To help relieve symptoms, the surgeon may begin treating osteoarthritis with one or more of the following non-surgical approaches:  Oral medications. Nonsteroidal anti-inflammatory drugs (NSAIDs), such as ibuprofen, are often helpful in reducing the inflammation and pain. Occasionally a prescription for a steroid medication is needed to adequately reduce symptoms.   Orthotic devices. Custom orthotic devices (shoe inserts) are often prescribed to provide support to improve the foot s mechanics or cushioning to help minimize pain.   Bracing. Bracing, which restricts motion and supports the joint, can reduce pain during walking and help prevent further deformity.   Immobilization. Protecting the foot from movement by wearing a cast or removable cast-boot may be necessary to allow the inflammation to resolve.   Steroid injections. In some cases, steroid injections are applied to the affected joint to deliver anti-inflammatory medication.   Physical therapy. Exercises to strengthen the muscles, especially when the osteoarthritis occurs in the ankle, may give the patient greater stability and help avoid injury that might worsen the condition.   DO I NEED SURGERY?  When osteoarthritis has progressed substantially or failed to improve with non-surgical treatment, surgery may be recommended. In advanced cases, surgery may be the only option. The goal of surgery is to decrease pain and improve function. The foot and ankle surgeon will consider a number of factors when selecting the procedure best suited to the patient s condition and lifestyle.    www.pedifix.com or call 4-083-PEDIFIX  HAMMERTOE PADS / TOE SPLINTS

## 2021-08-13 NOTE — LETTER
8/13/2021         RE: Rhonda Trotter  [address] [facility] [address]        Dear Colleague,    Thank you for referring your patient, Rhonda Trotter, to the [facility] POD[facility]RY. Please see a copy of my visit note below.    PATIENT HISTORY:  Corina Lara NP requested I see this patient for their foot issue.  Rhonda Trotter is a 71 year old female who presents to clinic for foot pain.  Patient notes discomfort on the balls of her feet.  She states is been going on for about 6 months.  States when she is in bed and her foot is laying on the bed.  Does not appear when she is walking she states.  She does wear good shoes and does not go barefoot at all because she is having his history of plantar fasciitis and.  Denies specific injury.  She is wondering what is causing the pain and what can be done for it.    Review of Systems:  Patient denies fever, chills, rash, wound, limping, numbness, weakness, heart burn, blood in stool, chest pain with activity, calf pain when walking, shortness of breath with activity, chronic cough, easy bleeding/bruising, swelling of ankles, excessive thirst, fatigue, depression, anxiety.  Patient admits to stiffness.     PAST MEDICAL HISTORY:   Past Medical History:   Diagnosis Date     Arthritis      Obesity, unspecified      Pure hypercholesterolemia      Unspecified essential hypertension      Unspecified hypothyroidism         PAST SURGICAL HISTORY:   Past Surgical History:   Procedure Laterality Date     ARTHROPLASTY KNEE Left 2/3/2016    Procedure: ARTHROPLASTY KNEE;  Surgeon: Abdiel Ledesma MD;  Location:  OR     ARTHROPLASTY KNEE Right 10/11/2016    Procedure: ARTHROPLASTY KNEE;  Surgeon: Abdiel Ledesma MD;  Location:  OR     BIOPSY  August 2014    breast biopsy was negative     COLONOSCOPY  2013    every 5 years starting at age 52     orif Left     wrist     SURGICAL HISTORY OF -       wisdom teeth extraction         MEDICATIONS:   Current Outpatient Medications:      alendronate (FOSAMAX) 70 MG tablet, Take 1 tablet (70 mg) by mouth with 8oz water every 7 days 30 minutes before breakfast and remain upright during this time., Disp: 12 tablet, Rfl: 3     aspirin EC 81 MG tablet, Take 1 tablet (81 mg) by mouth daily, Disp: , Rfl:      calcium carbonate (TUMS) 500 MG chewable tablet, Take 1-2 chew tab by mouth daily as needed for heartburn, Disp: , Rfl:      Calcium-Phosphorus-Vitamin D (CALCIUM GUMMIES) 250-100-500 MG-MG-UNIT CHEW, Take 2 tablets by mouth daily as needed, Disp: , Rfl:      Cobalamine Combinations (B-12) 100-5000 MCG SUBL, Place 1 tablet under the tongue daily as needed (Patient taking differently: Place 1 tablet under the tongue daily ), Disp: 50 tablet, Rfl: 0     diphenhydrAMINE (BENADRYL) 25 MG capsule, Take 1 capsule (25 mg) by mouth nightly as needed for sleep, Disp: 56 capsule, Rfl:      fluticasone (FLONASE) 50 MCG/ACT spray, USE 2 SPRAYS IN EACH NOSTRIL DAILY, Disp: 16 g, Rfl: PRN     levothyroxine (SYNTHROID/LEVOTHROID) 100 MCG tablet, Take 1 tablet (100 mcg) by mouth daily, Disp: 90 tablet, Rfl: 3     lisinopril (ZESTRIL) 30 MG tablet, Take 1 tablet (30 mg) by mouth daily, Disp: 90 tablet, Rfl: 3     MAGNESIUM PO, Take by mouth daily Magnesium 7, Disp: , Rfl:      multivitamin, therapeutic with minerals (THERA-VIT-M) TABS, Take 1 tablet by mouth daily, Disp: 30 each, Rfl: 0     Probiotic Product (PROBIOTIC PO), Take 2 capsules by mouth every morning [facility] Digestive enzymes Plus prebiotic & probiotic supplement, Disp: , Rfl:      simvastatin (ZOCOR) 20 MG tablet, Take 1 tablet (20 mg) by mouth At Bedtime, Disp: 90 tablet, Rfl: 3     ALLERGIES:    Allergies   Allergen Reactions     No Known Drug Allergies         SOCIAL HISTORY:   Social History     Socioeconomic History     Marital status: Single     Spouse name: Not on file     Number of children: Not on file     Years of education: Not on  file     Highest education level: Not on file   Occupational History     Not on file   Tobacco Use     Smoking status: Never Smoker     Smokeless tobacco: Never Used   Substance and Sexual Activity     Alcohol use: Yes     Comment: very light use     Drug use: No     Sexual activity: Never     Birth control/protection: None   Other Topics Concern     Parent/sibling w/ CABG, MI or angioplasty before 65F 55M? Yes     Comment: brother  of heart attack at age 53   Social History Narrative    Dairy/d 3-4 servings/d.     Caffeine 0-1 servings/d    Exercise 1-2 x week trying to increase    Sunscreen used - Yes,occ    Seatbelts used - Yes    Working smoke/CO detectors in the home - Yes    Guns stored in the home - No    Self Breast Exams - No    Self Testicular Exam - NA    Eye Exam up to date - Yes     Dental Exam up to date - Yes     Pap Smear up to date - no    Mammogram up to date - has appt today    PSA up to date - NA    Dexa Scan up to date - 3/06    Flex Sig / Colonoscopy up to date - Yes,May 2008,repeat in 5yrs    Immunizations up to date - Yes,Td     Abuse: Current or Past(Physical, Sexual or Emotional)- No    Do you feel safe in your environment - Yes     Social Determinants of Health     Financial Resource Strain:      Difficulty of Paying Living Expenses:    Food Insecurity:      Worried About Running Out of Food in the Last Year:      Ran Out of Food in the Last Year:    Transportation Needs:      Lack of Transportation (Medical):      Lack of Transportation (Non-Medical):    Physical Activity:      Days of Exercise per Week:      Minutes of Exercise per Session:    Stress:      Feeling of Stress :    Social Connections:      Frequency of Communication with Friends and Family:      Frequency of Social Gatherings with Friends and Family:      Attends Congregational Services:      Active Member of Clubs or Organizations:      Attends Club or Organization Meetings:      Marital Status:    Intimate Partner  "Violence:      Fear of Current or Ex-Partner:      Emotionally Abused:      Physically Abused:      Sexually Abused:         FAMILY HISTORY:   Family History   Problem Relation Age of Onset     C.A.D. Father         MI's x 2     Hypertension Father      Hyperlipidemia Father      Prostate Cancer Father      Cancer - colorectal Mother         diagnosed age 55     Heart Disease Mother         mi     Colon Cancer Mother      Obesity Mother      Circulatory Sister         aortic stenosis; Raynauds     Lipids Sister         on Lipitor     Respiratory Sister         pulmonary fibrosis     Hypertension Sister         lost wt and off bp meds     C.A.D. Brother          of MI     Heart Disease Brother 53        mi     Hypertension Brother      Respiratory Paternal Uncle         Tb     Heart Disease Paternal Uncle      Gynecology Daughter      Hypertension Sister      Hyperlipidemia Sister      Obesity Sister      Hypertension Brother      Diabetes No family hx of      Cerebrovascular Disease No family hx of      Breast Cancer No family hx of         EXAM:Vitals: /74   Ht 1.676 m (5' 6\")   Wt 123.4 kg (272 lb)   LMP 2004   BMI 43.90 kg/m      General appearance: Patient is alert and fully cooperative with history & exam.  No sign of distress is noted during the visit.     Psychiatric: Affect is pleasant & appropriate.  Patient appears motivated to improve health.     Respiratory: Breathing is regular & unlabored while sitting.     HEENT: Hearing is intact to spoken word.  Speech is clear.  No gross evidence of visual impairment that would impact ambulation.     Dermatologic: Skin is intact to both lower extremities without significant lesions, rash or abrasion.  No paronychia or evidence of soft tissue infection is noted.     Vascular: DP & PT pulses are intact & regular bilaterally.  No significant edema or varicosities noted.  CFT and skin temperature is normal to both lower extremities.     Neurologic: " Lower extremity sensation is intact to light touch.  No evidence of weakness or contracture in the lower extremities.  No evidence of neuropathy.     Musculoskeletal: Patient is ambulatory without assistive device or brace.  Semiflexible contracture of toes 2 through 4 bilaterally.  Decreased range of motion of the toes 2 through 4.  Pain on palpation of the plantar aspects of the second and third metatarsal heads bilaterally.     ASSESSMENT:    Foot pain, bilateral  Capsulitis of metatarsophalangeal (MTP) joints of both feet  Hammer toes of both feet     Medical Decision Making/Plan:  Reviewed patient's chart in [facility]. Reviewed and discussed causes of hammertoes with patient.  Explained that this can be caused by an overpowering of muscles or by the way we walk.  Discussed conservative treatments such as orthotics, pads, shoe gear.  Explained that sometimes the flexor tendons can be cut to try and straighten the toe and reduce rubbing. This is normally done in office and patient is weight bearing in postop she for 1-2 weeks.  We also discussed surgical intervention to remove the joint and possibly fuse the toe.  Normally patient has a pin sticking out of the toe for about 6 weeks and can not get the foot wet. Patient would have to be minimal weight bearing in cam boot.      Reviewed and discussed causes of capsulitis.  Talked about how the elongated 2nd metatarsal can cause more pressure to occur to the joint.  We talked about treatments such as padding, orthotics, injection, physical therapy, immobilization, MRI, and possible surgery to shorten metatarsal.    About surgery to straighten hammertoes.  Patient is not interested.  We will have her apply over-the-counter topical Voltaren gel.  She seems to only have pain when she is in bed and her feet are against the mattress.  Recommend that she put two pillows under the feet so that the feet rests on the mattress to try to help with pain.  Did talk about toes to try  to help hammering toes.  All questions were answered to patient's satisfaction she will call for questions or concerns.    Patient risk factor: Patient is at low risk for infection.        Jaimie Wilson DPM, Podiatry/Foot and Ankle Surgery        Again, thank you for allowing me to participate in the care of your patient.        Sincerely,        Jaimie Wilson DPM, Podiatry/Foot and Ankle Surgery

## 2021-08-13 NOTE — PROGRESS NOTES
PATIENT HISTORY:  Corina Lara NP requested I see this patient for their foot issue.  Rhonda Trotter is a 71 year old female who presents to clinic for foot pain.  Patient notes discomfort on the balls of her feet.  She states is been going on for about 6 months.  States when she is in bed and her foot is laying on the bed.  Does not appear when she is walking she states.  She does wear good shoes and does not go barefoot at all because she is having his history of plantar fasciitis and.  Denies specific injury.  She is wondering what is causing the pain and what can be done for it.    Review of Systems:  Patient denies fever, chills, rash, wound, limping, numbness, weakness, heart burn, blood in stool, chest pain with activity, calf pain when walking, shortness of breath with activity, chronic cough, easy bleeding/bruising, swelling of ankles, excessive thirst, fatigue, depression, anxiety.  Patient admits to stiffness.     PAST MEDICAL HISTORY:   Past Medical History:   Diagnosis Date     Arthritis      Obesity, unspecified      Pure hypercholesterolemia      Unspecified essential hypertension      Unspecified hypothyroidism         PAST SURGICAL HISTORY:   Past Surgical History:   Procedure Laterality Date     ARTHROPLASTY KNEE Left 2/3/2016    Procedure: ARTHROPLASTY KNEE;  Surgeon: Abdiel Ledesma MD;  Location:  OR     ARTHROPLASTY KNEE Right 10/11/2016    Procedure: ARTHROPLASTY KNEE;  Surgeon: Abdiel Ledesma MD;  Location:  OR     BIOPSY  August 2014    breast biopsy was negative     COLONOSCOPY  2013    every 5 years starting at age 52     orif Left     wrist     SURGICAL HISTORY OF -       wisdom teeth extraction        MEDICATIONS:   Current Outpatient Medications:      alendronate (FOSAMAX) 70 MG tablet, Take 1 tablet (70 mg) by mouth with 8oz water every 7 days 30 minutes before breakfast and remain upright during this time., Disp: 12 tablet, Rfl: 3     aspirin EC 81 MG tablet,  Take 1 tablet (81 mg) by mouth daily, Disp: , Rfl:      calcium carbonate (TUMS) 500 MG chewable tablet, Take 1-2 chew tab by mouth daily as needed for heartburn, Disp: , Rfl:      Calcium-Phosphorus-Vitamin D (CALCIUM GUMMIES) 250-100-500 MG-MG-UNIT CHEW, Take 2 tablets by mouth daily as needed, Disp: , Rfl:      Cobalamine Combinations (B-12) 100-5000 MCG SUBL, Place 1 tablet under the tongue daily as needed (Patient taking differently: Place 1 tablet under the tongue daily ), Disp: 50 tablet, Rfl: 0     diphenhydrAMINE (BENADRYL) 25 MG capsule, Take 1 capsule (25 mg) by mouth nightly as needed for sleep, Disp: 56 capsule, Rfl:      fluticasone (FLONASE) 50 MCG/ACT spray, USE 2 SPRAYS IN EACH NOSTRIL DAILY, Disp: 16 g, Rfl: PRN     levothyroxine (SYNTHROID/LEVOTHROID) 100 MCG tablet, Take 1 tablet (100 mcg) by mouth daily, Disp: 90 tablet, Rfl: 3     lisinopril (ZESTRIL) 30 MG tablet, Take 1 tablet (30 mg) by mouth daily, Disp: 90 tablet, Rfl: 3     MAGNESIUM PO, Take by mouth daily Magnesium 7, Disp: , Rfl:      multivitamin, therapeutic with minerals (THERA-VIT-M) TABS, Take 1 tablet by mouth daily, Disp: 30 each, Rfl: 0     Probiotic Product (PROBIOTIC PO), Take 2 capsules by mouth every morning [employer] Digestive enzymes Plus prebiotic & probiotic supplement, Disp: , Rfl:      simvastatin (ZOCOR) 20 MG tablet, Take 1 tablet (20 mg) by mouth At Bedtime, Disp: 90 tablet, Rfl: 3     ALLERGIES:    Allergies   Allergen Reactions     No Known Drug Allergies         SOCIAL HISTORY:   Social History     Socioeconomic History     Marital status: Single     Spouse name: Not on file     Number of children: Not on file     Years of education: Not on file     Highest education level: Not on file   Occupational History     Not on file   Tobacco Use     Smoking status: Never Smoker     Smokeless tobacco: Never Used   Substance and Sexual Activity     Alcohol use: Yes     Comment: very light use     Drug use: No      Sexual activity: Never     Birth control/protection: None   Other Topics Concern     Parent/sibling w/ CABG, MI or angioplasty before 65F 55M? Yes     Comment: brother  of heart attack at age 53   Social History Narrative    Dairy/d 3-4 servings/d.     Caffeine 0-1 servings/d    Exercise 1-2 x week trying to increase    Sunscreen used - Yes,occ    Seatbelts used - Yes    Working smoke/CO detectors in the home - Yes    Guns stored in the home - No    Self Breast Exams - No    Self Testicular Exam - NA    Eye Exam up to date - Yes     Dental Exam up to date - Yes     Pap Smear up to date - no    Mammogram up to date - has appt today    PSA up to date - NA    Dexa Scan up to date - 3/06    Flex Sig / Colonoscopy up to date - Yes,May 2008,repeat in 5yrs    Immunizations up to date - Yes,Td     Abuse: Current or Past(Physical, Sexual or Emotional)- No    Do you feel safe in your environment - Yes     Social Determinants of Health     Financial Resource Strain:      Difficulty of Paying Living Expenses:    Food Insecurity:      Worried About Running Out of Food in the Last Year:      Ran Out of Food in the Last Year:    Transportation Needs:      Lack of Transportation (Medical):      Lack of Transportation (Non-Medical):    Physical Activity:      Days of Exercise per Week:      Minutes of Exercise per Session:    Stress:      Feeling of Stress :    Social Connections:      Frequency of Communication with Friends and Family:      Frequency of Social Gatherings with Friends and Family:      Attends [employer] Services:      Active Member of Clubs or Organizations:      Attends Club or Organization Meetings:      Marital Status:    Intimate Partner Violence:      Fear of Current or Ex-Partner:      Emotionally Abused:      Physically Abused:      Sexually Abused:         FAMILY HISTORY:   Family History   Problem Relation Age of Onset     C.A.D. Father         MI's x 2     Hypertension Father      Hyperlipidemia  "Father      Prostate Cancer Father      Cancer - colorectal Mother         diagnosed age 55     Heart Disease Mother         mi     Colon Cancer Mother      Obesity Mother      Circulatory Sister         aortic stenosis; Raynauds     Lipids Sister         on Lipitor     Respiratory Sister         pulmonary fibrosis     Hypertension Sister         lost wt and off bp meds     C.A.D. Brother          of MI     Heart Disease Brother 53        mi     Hypertension Brother      Respiratory Paternal Uncle         Tb     Heart Disease Paternal Uncle      Gynecology Daughter      Hypertension Sister      Hyperlipidemia Sister      Obesity Sister      Hypertension Brother      Diabetes No family hx of      Cerebrovascular Disease No family hx of      Breast Cancer No family hx of         EXAM:Vitals: /74   Ht 1.676 m (5' 6\")   Wt 123.4 kg (272 lb)   LMP 2004   BMI 43.90 kg/m      General appearance: Patient is alert and fully cooperative with history & exam.  No sign of distress is noted during the visit.     Psychiatric: Affect is pleasant & appropriate.  Patient appears motivated to improve health.     Respiratory: Breathing is regular & unlabored while sitting.     HEENT: Hearing is intact to spoken word.  Speech is clear.  No gross evidence of visual impairment that would impact ambulation.     Dermatologic: Skin is intact to both lower extremities without significant lesions, rash or abrasion.  No paronychia or evidence of soft tissue infection is noted.     Vascular: DP & PT pulses are intact & regular bilaterally.  No significant edema or varicosities noted.  CFT and skin temperature is normal to both lower extremities.     Neurologic: Lower extremity sensation is intact to light touch.  No evidence of weakness or contracture in the lower extremities.  No evidence of neuropathy.     Musculoskeletal: Patient is ambulatory without assistive device or brace.  Semiflexible contracture of toes 2 through 4 " bilaterally.  Decreased range of motion of the toes 2 through 4.  Pain on palpation of the plantar aspects of the second and third metatarsal heads bilaterally.     ASSESSMENT:    Foot pain, bilateral  Capsulitis of metatarsophalangeal (MTP) joints of both feet  Hammer toes of both feet     Medical Decision Making/Plan:  Reviewed patient's chart in [facility]. Reviewed and discussed causes of hammertoes with patient.  Explained that this can be caused by an overpowering of muscles or by the way we walk.  Discussed conservative treatments such as orthotics, pads, shoe gear.  Explained that sometimes the flexor tendons can be cut to try and straighten the toe and reduce rubbing. This is normally done in office and patient is weight bearing in postop she for 1-2 weeks.  We also discussed surgical intervention to remove the joint and possibly fuse the toe.  Normally patient has a pin sticking out of the toe for about 6 weeks and can not get the foot wet. Patient would have to be minimal weight bearing in cam boot.      Reviewed and discussed causes of capsulitis.  Talked about how the elongated 2nd metatarsal can cause more pressure to occur to the joint.  We talked about treatments such as padding, orthotics, injection, physical therapy, immobilization, MRI, and possible surgery to shorten metatarsal.    About surgery to straighten hammertoes.  Patient is not interested.  We will have her apply over-the-counter topical Voltaren gel.  She seems to only have pain when she is in bed and her feet are against the mattress.  Recommend that she put two pillows under the feet so that the feet rests on the mattress to try to help with pain.  Did talk about toes to try to help hammering toes.  All questions were answered to patient's satisfaction she will call for questions or concerns.    Patient risk factor: Patient is at low risk for infection.        Jaimie Wilson DPM, Podiatry/Foot and Ankle Surgery

## 2021-09-02 ENCOUNTER — MYC MEDICAL ADVICE (OUTPATIENT)
Dept: FAMILY MEDICINE | Facility: CLINIC | Age: 71
End: 2021-09-02

## 2021-09-02 DIAGNOSIS — M81.0 OSTEOPOROSIS, UNSPECIFIED OSTEOPOROSIS TYPE, UNSPECIFIED PATHOLOGICAL FRACTURE PRESENCE: Primary | ICD-10-CM

## 2021-09-07 NOTE — TELEPHONE ENCOUNTER
Erica,    1. Her second dose of pneumonia vaccine due 2/7/21 per [facility] records. She is wondering if she should wait to get it at her annual in Nov?    2. Last bone scan 5/8/18. She is wondering when she needs this again    Merle Ureña, RN, BSN  [facility]

## 2021-09-18 ENCOUNTER — HEALTH MAINTENANCE LETTER (OUTPATIENT)
Age: 71
End: 2021-09-18

## 2021-11-15 ENCOUNTER — HOSPITAL ENCOUNTER (OUTPATIENT)
Dept: BONE DENSITY | Facility: CLINIC | Age: 71
Discharge: HOME | End: 2021-11-15
Attending: NURSE PRACTITIONER | Admitting: NURSE PRACTITIONER
Payer: MEDICARE

## 2021-11-15 DIAGNOSIS — M81.0 OSTEOPOROSIS, UNSPECIFIED OSTEOPOROSIS TYPE, UNSPECIFIED PATHOLOGICAL FRACTURE PRESENCE: ICD-10-CM

## 2021-11-15 PROCEDURE — 77080 DXA BONE DENSITY AXIAL: CPT

## 2021-11-29 ENCOUNTER — OFFICE VISIT (OUTPATIENT)
Dept: FAMILY MEDICINE | Facility: CLINIC | Age: 71
End: 2021-11-29
Payer: MEDICARE

## 2021-11-29 VITALS
OXYGEN SATURATION: 96 % | SYSTOLIC BLOOD PRESSURE: 132 MMHG | RESPIRATION RATE: 16 BRPM | HEART RATE: 71 BPM | DIASTOLIC BLOOD PRESSURE: 68 MMHG | WEIGHT: 270 LBS | BODY MASS INDEX: 44.98 KG/M2 | HEIGHT: 65 IN | TEMPERATURE: 97 F

## 2021-11-29 DIAGNOSIS — E66.01 MORBID OBESITY WITH BMI OF 40.0-44.9, ADULT (H): ICD-10-CM

## 2021-11-29 DIAGNOSIS — E03.9 HYPOTHYROIDISM, UNSPECIFIED TYPE: ICD-10-CM

## 2021-11-29 DIAGNOSIS — M81.0 OSTEOPOROSIS, UNSPECIFIED OSTEOPOROSIS TYPE, UNSPECIFIED PATHOLOGICAL FRACTURE PRESENCE: ICD-10-CM

## 2021-11-29 DIAGNOSIS — Z00.00 ENCOUNTER FOR MEDICARE ANNUAL WELLNESS EXAM: Primary | ICD-10-CM

## 2021-11-29 DIAGNOSIS — E78.5 HYPERLIPIDEMIA LDL GOAL <130: ICD-10-CM

## 2021-11-29 DIAGNOSIS — I10 ESSENTIAL HYPERTENSION WITH GOAL BLOOD PRESSURE LESS THAN 140/90: ICD-10-CM

## 2021-11-29 PROCEDURE — 80048 BASIC METABOLIC PNL TOTAL CA: CPT | Performed by: NURSE PRACTITIONER

## 2021-11-29 PROCEDURE — 84443 ASSAY THYROID STIM HORMONE: CPT | Performed by: NURSE PRACTITIONER

## 2021-11-29 PROCEDURE — 90732 PPSV23 VACC 2 YRS+ SUBQ/IM: CPT | Performed by: NURSE PRACTITIONER

## 2021-11-29 PROCEDURE — 36415 COLL VENOUS BLD VENIPUNCTURE: CPT | Performed by: NURSE PRACTITIONER

## 2021-11-29 PROCEDURE — 99214 OFFICE O/P EST MOD 30 MIN: CPT | Mod: 25 | Performed by: NURSE PRACTITIONER

## 2021-11-29 PROCEDURE — G0009 ADMIN PNEUMOCOCCAL VACCINE: HCPCS | Performed by: NURSE PRACTITIONER

## 2021-11-29 PROCEDURE — G0439 PPPS, SUBSEQ VISIT: HCPCS | Performed by: NURSE PRACTITIONER

## 2021-11-29 PROCEDURE — 80061 LIPID PANEL: CPT | Performed by: NURSE PRACTITIONER

## 2021-11-29 RX ORDER — VALSARTAN 160 MG/1
160 TABLET ORAL DAILY
Qty: 90 TABLET | Refills: 3 | Status: SHIPPED | OUTPATIENT
Start: 2021-11-29 | End: 2022-11-16

## 2021-11-29 RX ORDER — SIMVASTATIN 20 MG
20 TABLET ORAL AT BEDTIME
Qty: 90 TABLET | Refills: 3 | Status: SHIPPED | OUTPATIENT
Start: 2021-11-29 | End: 2022-02-07

## 2021-11-29 RX ORDER — LEVOTHYROXINE SODIUM 100 UG/1
100 TABLET ORAL DAILY
Qty: 90 TABLET | Refills: 3 | Status: SHIPPED | OUTPATIENT
Start: 2021-11-29 | End: 2022-02-07

## 2021-11-29 RX ORDER — ALENDRONATE SODIUM 70 MG/1
TABLET ORAL
Qty: 12 TABLET | Refills: 3 | Status: SHIPPED | OUTPATIENT
Start: 2021-11-29 | End: 2022-11-16

## 2021-11-29 ASSESSMENT — ENCOUNTER SYMPTOMS
NAUSEA: 0
PALPITATIONS: 0
JOINT SWELLING: 0
WEAKNESS: 0
HEADACHES: 0
CHILLS: 0
PARESTHESIAS: 0
DIZZINESS: 1
SHORTNESS OF BREATH: 0
FEVER: 0
ARTHRALGIAS: 0
NERVOUS/ANXIOUS: 1
COUGH: 1
MYALGIAS: 0
BREAST MASS: 0
DIARRHEA: 1
SORE THROAT: 0
DYSURIA: 0
CONSTIPATION: 1
HEARTBURN: 0
FREQUENCY: 0
HEMATURIA: 0
ABDOMINAL PAIN: 0
HEMATOCHEZIA: 0
EYE PAIN: 0

## 2021-11-29 ASSESSMENT — ACTIVITIES OF DAILY LIVING (ADL): CURRENT_FUNCTION: NO ASSISTANCE NEEDED

## 2021-11-29 ASSESSMENT — MIFFLIN-ST. JEOR: SCORE: 1750.59

## 2021-11-29 NOTE — PROGRESS NOTES
"SUBJECTIVE:   Rhonda Trotter is a 71 year old female who presents for Preventive Visit.      Patient has been advised of split billing requirements and indicates understanding: Yes   Are you in the first 12 months of your Medicare coverage?  No    Healthy Habits:     In general, how would you rate your overall health?  Good    Frequency of exercise:  2-3 days/week    Duration of exercise:  45-60 minutes    Do you usually eat at least 4 servings of fruit and vegetables a day, include whole grains    & fiber and avoid regularly eating high fat or \"junk\" foods?  Yes    Taking medications regularly:  Yes    Medication side effects:  None    Ability to successfully perform activities of daily living:  No assistance needed    Home Safety:  No safety concerns identified    Hearing Impairment:  No hearing concerns    In the past 6 months, have you been bothered by leaking of urine? Yes    In general, how would you rate your overall mental or emotional health?  Good      PHQ-2 Total Score: 0    Additional concerns today:  Yes  Her blood pressure has been well-controlled but she does have a mild annoying, chronic cough.    She does have some vulvar itching.   She has some incontinence, especially with coughing.    She denies any symptoms such as fatigue; changes in weight; temperature intolerance; skin changes; constipation or loose stools.      Do you feel safe in your environment? Yes    Have you ever done Advance Care Planning? (For example, a Health Directive, POLST, or a discussion with a medical provider or your loved ones about your wishes): Yes, advance care planning is on file.       Fall risk  Fallen 2 or more times in the past year?: No  Any fall with injury in the past year?: No  click delete button to remove this line now  Cognitive Screening   1) Repeat 3 items (Leader, Season, Table)    2) Clock draw: NORMAL  3) 3 item recall: Recalls 3 objects  Results: 3 items recalled: COGNITIVE IMPAIRMENT LESS " LIKELY    Mini-CogTM Copyright YARI Hernandez. Licensed by the author for use in [facility]; reprinted with permission (hanny@[facility]). All rights reserved.      Do you have sleep apnea, excessive snoring or daytime drowsiness?: no    Reviewed and updated as needed this visit by clinical staff  Tobacco  Allergies  Meds   Med Hx  Surg Hx  Fam Hx  Soc Hx       Reviewed and updated as needed this visit by Provider               Social History     Tobacco Use     Smoking status: Never Smoker     Smokeless tobacco: Never Used   Substance Use Topics     Alcohol use: Yes     Comment: very light use     If you drink alcohol do you typically have >3 drinks per day or >7 drinks per week? No    Alcohol Use 11/10/2019   Prescreen: >3 drinks/day or >7 drinks/week? No   Prescreen: >3 drinks/day or >7 drinks/week? -   No flowsheet data found.            Current providers sharing in care for this patient include:   Patient Care Team:  Corina Lara NP as PCP - General  Corina Lara NP as Assigned PCP  Jaimie Wilson DPM, Podiatry/Foot and Ankle Surgery as Assigned Musculoskeletal Provider    The following health maintenance items are reviewed in Epic and correct as of today:  Health Maintenance Due   Topic Date Due     ANNUAL REVIEW OF HM ORDERS  Never done     PHQ-2  01/01/2021     Pneumococcal Vaccine: 65+ Years (2 of 2 - PPSV23) 02/07/2021     DTAP/TDAP/TD IMMUNIZATION (3 - Td or Tdap) 05/06/2021     BMP  11/09/2021     FALL RISK ASSESSMENT  11/09/2021     MEDICARE ANNUAL WELLNESS VISIT  11/09/2021     TSH W/FREE T4 REFLEX  11/09/2021           FHS-7: No flowsheet data found.      Pertinent mammograms are reviewed under the imaging tab.    Review of Systems   Constitutional: Negative for chills and fever.   HENT: Negative for congestion, ear pain, hearing loss and sore throat.    Eyes: Positive for visual disturbance. Negative for pain.   Respiratory: Positive for cough. Negative for shortness of  "breath.    Cardiovascular: Negative for chest pain, palpitations and peripheral edema.   Gastrointestinal: Positive for constipation and diarrhea. Negative for abdominal pain, heartburn, hematochezia and nausea.   Breasts:  Negative for tenderness, breast mass and discharge.   Genitourinary: Positive for urgency. Negative for dysuria, frequency, genital sores, hematuria, pelvic pain, vaginal bleeding and vaginal discharge.   Musculoskeletal: Negative for arthralgias, joint swelling and myalgias.   Skin: Negative for rash.   Neurological: Positive for dizziness. Negative for weakness, headaches and paresthesias.   Psychiatric/Behavioral: Negative for mood changes. The patient is nervous/anxious.          OBJECTIVE:   /68 (BP Location: Right arm, Patient Position: Chair, Cuff Size: Adult Large)   Pulse 71   Temp 97  F (36.1  C) (Temporal)   Resp 16   Ht 1.651 m (5' 5\")   Wt 122.5 kg (270 lb)   LMP 08/01/2004   SpO2 96%   BMI 44.93 kg/m   Estimated body mass index is 44.93 kg/m  as calculated from the following:    Height as of this encounter: 1.651 m (5' 5\").    Weight as of this encounter: 122.5 kg (270 lb).  Physical Exam  GENERAL: healthy, alert and no distress  EYES: Eyes grossly normal to inspection, PERRL and conjunctivae and sclerae normal  HENT: ear canals and TM's normal, nose and mouth without ulcers or lesions  NECK: no adenopathy, no asymmetry, masses, or scars and thyroid normal to palpation  RESP: lungs clear to auscultation - no rales, rhonchi or wheezes  CV: regular rate and rhythm, normal S1 S2, no S3 or S4, no murmur, click or rub, no peripheral edema and peripheral pulses strong  ABDOMEN: soft, nontender, no hepatosplenomegaly, no masses and bowel sounds normal  MS: no gross musculoskeletal defects noted, no edema  SKIN: no suspicious lesions or rashes  NEURO: Normal strength and tone, mentation intact and speech normal  PSYCH: mentation appears normal, affect " "normal/bright        ASSESSMENT / PLAN:   (Z00.00) Encounter for Medicare annual wellness exam  (primary encounter diagnosis)  Comment:   Plan:     (E66.01,  Z68.41) BMI of 40.0-44.9, adult (H)  Comment:   Plan: Discussed diet and exercise.     (I10) Essential hypertension with goal blood pressure less than 140/90  Comment: at goal  Plan: valsartan (DIOVAN) 160 MG tablet, Basic         metabolic panel  (Ca, Cl, CO2, Creat, Gluc, K,         Na, BUN), Basic metabolic panel  (Ca, Cl, CO2,         Creat, Gluc, K, Na, BUN)        The current medical regimen is effective;  continue present plan and medications.   Cough may be an ACE side effect, will change to Valsartan and follow up in one month if cough persists.  She will have her blood pressure rechecked at [facility] and message me with results.     (E03.9) Hypothyroidism, unspecified type  Comment:   Plan: levothyroxine (SYNTHROID/LEVOTHROID) 100 MCG         tablet, TSH with free T4 reflex        The current medical regimen is effective;  continue present plan and medications.     (M81.0) Osteoporosis, unspecified osteoporosis type, unspecified pathological fracture presence  Comment:   Plan: alendronate (FOSAMAX) 70 MG tablet        The current medical regimen is effective;  continue present plan and medications.     (E78.5) Hyperlipidemia LDL goal <130  Comment:   Plan: simvastatin (ZOCOR) 20 MG tablet, Lipid panel         reflex to direct LDL Fasting        .contg       Patient has been advised of split billing requirements and indicates understanding:   COUNSELING:  Reviewed preventive health counseling, as reflected in patient instructions    Estimated body mass index is 44.93 kg/m  as calculated from the following:    Height as of this encounter: 1.651 m (5' 5\").    Weight as of this encounter: 122.5 kg (270 lb).    Weight management plan: Discussed healthy diet and exercise guidelines    She reports that she has never smoked. She has never used smokeless " tobacco.      Appropriate preventive services were discussed with this patient, including applicable screening as appropriate for cardiovascular disease, diabetes, osteopenia/osteoporosis, and glaucoma.  As appropriate for age/gender, discussed screening for colorectal cancer, prostate cancer, breast cancer, and cervical cancer. Checklist reviewing preventive services available has been given to the patient.    Reviewed patients plan of care and provided an AVS. The Basic Care Plan (routine screening as documented in Health Maintenance) for Rhonda meets the Care Plan requirement. This Care Plan has been established and reviewed with the Patient.    Counseling Resources:  ATP IV Guidelines  Pooled Cohorts Equation Calculator  Breast Cancer Risk Calculator  Breast Cancer: Medication to Reduce Risk  FRAX Risk Assessment  ICSI Preventive Guidelines  Dietary Guidelines for Americans, 2010  USDA's MyPlate  ASA Prophylaxis  Lung CA Screening    Corina Lara NP  [facility]    Identified Health Risks:

## 2021-11-30 LAB
ANION GAP SERPL CALCULATED.3IONS-SCNC: 4 MMOL/L (ref 3–14)
BUN SERPL-MCNC: 13 MG/DL (ref 7–30)
CALCIUM SERPL-MCNC: 9 MG/DL (ref 8.5–10.1)
CHLORIDE BLD-SCNC: 100 MMOL/L (ref 94–109)
CHOLEST SERPL-MCNC: 190 MG/DL
CO2 SERPL-SCNC: 27 MMOL/L (ref 20–32)
CREAT SERPL-MCNC: 0.84 MG/DL (ref 0.52–1.04)
FASTING STATUS PATIENT QL REPORTED: YES
GFR SERPL CREATININE-BSD FRML MDRD: 71 ML/MIN/1.73M2
GLUCOSE BLD-MCNC: 88 MG/DL (ref 70–99)
HDLC SERPL-MCNC: 76 MG/DL
LDLC SERPL CALC-MCNC: 89 MG/DL
NONHDLC SERPL-MCNC: 114 MG/DL
POTASSIUM BLD-SCNC: 4.4 MMOL/L (ref 3.4–5.3)
SODIUM SERPL-SCNC: 131 MMOL/L (ref 133–144)
TRIGL SERPL-MCNC: 126 MG/DL
TSH SERPL DL<=0.005 MIU/L-ACNC: 1.92 MU/L (ref 0.4–4)

## 2022-03-02 ENCOUNTER — OFFICE VISIT (OUTPATIENT)
Dept: FAMILY MEDICINE | Facility: CLINIC | Age: 72
End: 2022-03-02
Payer: COMMERCIAL

## 2022-03-02 ENCOUNTER — MYC MEDICAL ADVICE (OUTPATIENT)
Dept: FAMILY MEDICINE | Facility: CLINIC | Age: 72
End: 2022-03-02

## 2022-03-02 VITALS
HEART RATE: 66 BPM | HEIGHT: 65 IN | OXYGEN SATURATION: 94 % | SYSTOLIC BLOOD PRESSURE: 118 MMHG | WEIGHT: 271 LBS | DIASTOLIC BLOOD PRESSURE: 76 MMHG | BODY MASS INDEX: 45.15 KG/M2 | TEMPERATURE: 98 F | RESPIRATION RATE: 16 BRPM

## 2022-03-02 DIAGNOSIS — I10 BENIGN ESSENTIAL HYPERTENSION: ICD-10-CM

## 2022-03-02 DIAGNOSIS — H26.9 CATARACT OF BOTH EYES, UNSPECIFIED CATARACT TYPE: ICD-10-CM

## 2022-03-02 DIAGNOSIS — Z01.818 PREOP GENERAL PHYSICAL EXAM: Primary | ICD-10-CM

## 2022-03-02 DIAGNOSIS — E66.01 MORBID OBESITY WITH BMI OF 40.0-44.9, ADULT (H): ICD-10-CM

## 2022-03-02 PROCEDURE — 99214 OFFICE O/P EST MOD 30 MIN: CPT | Performed by: NURSE PRACTITIONER

## 2022-03-02 NOTE — PATIENT INSTRUCTIONS
Preparing for Your Surgery  Getting started  A nurse will call you to review your health history and instructions. They will give you an arrival time based on your scheduled surgery time. Please be ready to share:    Your doctor's clinic name and phone number    Your medical, surgical and anesthesia history    A list of allergies and sensitivities    A list of medicines, including herbal treatments and over-the-counter drugs    Whether the patient has a legal guardian (ask how to send us the papers in advance)  Please tell us if you're pregnant--or if there's any chance you might be pregnant. Some surgeries may injure a fetus (unborn baby), so they require a pregnancy test. Surgeries that are safe for a fetus don't always need a test, and you can choose whether to have one.   If you have a child who's having surgery, please ask for a copy of Preparing for Your Child's Surgery.    Preparing for surgery    Within 30 days of surgery: Have a pre-op exam (sometimes called an H&P, or History and Physical). This can be done at a clinic or pre-operative center.  ? If you're having a , you may not need this exam. Talk to your care team.    At your pre-op exam, talk to your care team about all medicines you take. If you need to stop any medicines before surgery, ask when to start taking them again.  ? We do this for your safety. Many medicines can make you bleed too much during surgery. Some change how well surgery (anesthesia) drugs work.    Call your insurance company to let them know you're having surgery. (If you don't have insurance, call [phone].)    Call your clinic if there's any change in your health. This includes signs of a cold or flu (sore throat, runny nose, cough, rash, fever). It also includes a scrape or scratch near the surgery site.    If you have questions on the day of surgery, call your hospital or surgery center.  COVID testing  You may need to be tested for COVID-19 before having  surgery. If so, your surgical team will give you instructions for scheduling this test, separate from your preoperative history and physical.  Eating and drinking guidelines  For your safety: Unless your surgeon tells you otherwise, follow the guidelines below.    Eat and drink as usual until 8 hours before surgery. After that, no food or milk.    Drink clear liquids until 2 hours before surgery. These are liquids you can see through, like water, Gatorade and Propel Water. You may also have black coffee and tea (no cream or milk).    Nothing by mouth within 2 hours of surgery. This includes gum, candy and breath mints.    If you drink alcohol: Stop drinking it the night before surgery.    If your care team tells you to take medicine on the morning of surgery, it's okay to take it with a sip of water.  Preventing infection    Shower or bathe the night before and morning of your surgery. Follow the instructions your clinic gave you. (If no instructions, use regular soap.)    Don't shave or clip hair near your surgery site. We'll remove the hair if needed.    Don't smoke or vape the morning of surgery. You may chew nicotine gum up to 2 hours before surgery. A nicotine patch is okay.  ? Note: Some surgeries require you to completely quit smoking and nicotine. Check with your surgeon.    Your care team will make every effort to keep you safe from infection. We will:  ? Clean our hands often with soap and water (or an alcohol-based hand rub).  ? Clean the skin at your surgery site with a special soap that kills germs.  ? Give you a special gown to keep you warm. (Cold raises the risk of infection.)  ? Wear special hair covers, masks, gowns and gloves during surgery.  ? Give antibiotic medicine, if prescribed. Not all surgeries need antibiotics.  What to bring on the day of surgery    Photo ID and insurance card    Copy of your health care directive, if you have one    Glasses and hearing aides (bring cases)  ? You can't  wear contacts during surgery    Inhaler and eye drops, if you use them (tell us about these when you arrive)    CPAP machine or breathing device, if you use them    A few personal items, if spending the night    If you have . . .  ? A pacemaker, ICD (cardiac defibrillator) or other implant: Bring the ID card.  ? An implanted stimulator: Bring the remote control.  ? A legal guardian: Bring a copy of the certified (court-stamped) guardianship papers.  Please remove any jewelry, including body piercings. Leave jewelry and other valuables at home.  If you're going home the day of surgery    You must have a responsible adult drive you home. They should stay with you overnight as well.    If you don't have someone to stay with you, and you aren't safe to go home alone, we may keep you overnight. Insurance often won't pay for this.  After surgery  If it's hard to control your pain or you need more pain medicine, please call your surgeon's office.  Questions?   If you have any questions for your care team, list them here: _________________________________________________________________________________________________________________________________________________________________________ ____________________________________ ____________________________________ ____________________________________  For informational purposes only. Not to replace the advice of your health care provider. Copyright   2003, 2019 [facility]. All rights reserved. Clinically reviewed by Julieta Roca MD. [employer] [id number] - REV 07/21.

## 2022-03-02 NOTE — PROGRESS NOTES
[facility] [address] [place] [address]  Phone: [phone]  Primary Provider: Corina Lara        PREOPERATIVE EVALUATION:  Today's date: 3/2/2022    Rhonda Trotter is a 71 year old female who presents for a preoperative evaluation.    Surgical Information:  Surgery/Procedure: Cataracts    Surgery Location: [facility], [facility]  Surgeon: Dr. Kincaid  Surgery Date: 3/9/22 and 3/16/22  Time of Surgery: TBD  Where patient plans to recover: At home with family  Fax number for surgical facility: [phone], phone [phone]    Type of Anesthesia Anticipated: to be determined    Assessment & Plan     The proposed surgical procedure is considered LOW risk.    (Z01.818) Preop general physical exam  (primary encounter diagnosis)  Comment:   Plan:     (H26.9) Cataract of both eyes, unspecified cataract type  Comment:   Plan:     (I10) Benign essential hypertension  Comment: at goal  Plan: The current medical regimen is effective;  continue present plan and medications.     (E66.01,  Z68.41) BMI of 40.0-44.9, adult (H)  Comment:   Plan:          Risks and Recommendations:  The patient has the following additional risks and recommendations for perioperative complications:   - Morbid obesity (BMI >40)    Medication Instructions:   - ACE/ARB: May be continued on the day of surgery.     RECOMMENDATION:  APPROVAL GIVEN to proceed with proposed procedure, without further diagnostic evaluation.                Subjective     HPI related to upcoming procedure: Decreased vision secondary to cataracts    Preop Questions 3/2/2022   1. Have you ever had a heart attack or stroke? No   2. Have you ever had surgery on your heart or blood vessels, such as a stent placement, a coronary artery bypass, or surgery on an artery in your head, neck, heart, or legs? No   3. Do you have chest pain with activity? No   4. Do you have a history of  heart failure? No    5. Do you currently have a cold, bronchitis or symptoms of other infection? No   6. Do you have a cough, shortness of breath, or wheezing? No   7. Do you or anyone in your family have previous history of blood clots? No   8. Do you or does anyone in your family have a serious bleeding problem such as prolonged bleeding following surgeries or cuts? No   9. Have you ever had problems with anemia or been told to take iron pills? No   10. Have you had any abnormal blood loss such as black, tarry or bloody stools, or abnormal vaginal bleeding? No   11. Have you ever had a blood transfusion? No   12. Are you willing to have a blood transfusion if it is medically needed before, during, or after your surgery? Yes   13. Have you or any of your relatives ever had problems with anesthesia? No   14. Do you have sleep apnea, excessive snoring or daytime drowsiness? No   15. Do you have any artifical heart valves or other implanted medical devices like a pacemaker, defibrillator, or continuous glucose monitor? No   16. Do you have artificial joints? YES - knees   17. Are you allergic to latex? No   18. Is there any chance that you may be pregnant? -       Health Care Directive:  Patient has a Health Care Directive on file      Preoperative Review of :   reviewed - no record of controlled substances prescribed.      Status of Chronic Conditions:  HYPERTENSION - Patient has longstanding history of HTN , currently denies any symptoms referable to elevated blood pressure. Specifically denies chest pain, palpitations, dyspnea, orthopnea, PND or peripheral edema. Blood pressure readings have been in normal range. Current medication regimen is as listed below. Patient denies any side effects of medication.       Review of Systems  CONSTITUTIONAL: NEGATIVE for fever, chills, change in weight  INTEGUMENTARY/SKIN: NEGATIVE for worrisome rashes, moles or lesions  EYES: see HPI  ENT/MOUTH: NEGATIVE for ear, mouth and throat  problems  RESP: NEGATIVE for significant cough or SOB  CV: NEGATIVE for chest pain, palpitations or peripheral edema  GI: NEGATIVE for nausea, abdominal pain, heartburn, or change in bowel habits  : NEGATIVE for frequency, dysuria, or hematuria  MUSCULOSKELETAL: NEGATIVE for significant arthralgias or myalgia  NEURO: NEGATIVE for weakness, dizziness or paresthesias  ENDOCRINE: NEGATIVE for temperature intolerance, skin/hair changes  HEME: NEGATIVE for bleeding problems  PSYCHIATRIC: NEGATIVE for changes in mood or affect    Patient Active Problem List    Diagnosis Date Noted     Closed compression fracture of thoracic vertebra, initial encounter (H) 11/01/2018     Priority: Medium     Osteoporosis, unspecified osteoporosis type, unspecified pathological fracture presence 05/29/2018     Priority: Medium     Right knee pain 10/24/2016     Priority: Medium     Aftercare following right knee joint replacement surgery 10/24/2016     Priority: Medium     Benign essential hypertension 10/20/2016     Priority: Medium     Physical deconditioning 10/17/2016     Priority: Medium     BMI of 40.0-44.9, adult (H) 09/15/2016     Priority: Medium     Left knee pain 02/17/2016     Priority: Medium     Other orthopedic aftercare(V54.89) 02/17/2016     Priority: Medium     Advance care planning 02/16/2016     Priority: Medium     Advance Care Planning 2/16/2016: Receipt of ACP document:  Received: Health Care Directive which was witnessed or notarized on 12/17/14.  Document previously scanned on 2/3/16.  Validation form completed and sent to be scanned.  Code Status reflects choices in most recent ACP document.  Confirmed/documented designated decision maker(s).  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 Provider to review        Past Medical History:   Diagnosis Date     Arthritis      Obesity, unspecified      Pure hypercholesterolemia      Unspecified essential hypertension      Unspecified hypothyroidism      Past Surgical History:   Procedure Laterality Date     ARTHROPLASTY KNEE Left 2/3/2016    Procedure: ARTHROPLASTY KNEE;  Surgeon: Abdiel Ledesma MD;  Location: [facility] OR     ARTHROPLASTY KNEE Right 10/11/2016    Procedure: ARTHROPLASTY KNEE;  Surgeon: Abdiel Ledesma MD;  Location:  OR     BIOPSY  August 2014    breast biopsy was negative     COLONOSCOPY  2013    every 5 years starting at age 52     orif Left     wrist     SURGICAL HISTORY OF -       wisdom teeth extraction     Current Outpatient Medications   Medication Sig Dispense Refill     alendronate (FOSAMAX) 70 MG tablet TAKE 1 TABLET EVERY 7 DAYS WITH 8 OUNCES OF WATER 30 MINUTES BEFORE BREAKFAST AND REMAIN UPRIGHT DURING THIS TIME 12 tablet 3     calcium carbonate (TUMS) 500 MG chewable tablet Take 1-2 chew tab by mouth daily as needed for heartburn       Calcium-Phosphorus-Vitamin D (CALCIUM GUMMIES) 250-100-500 MG-MG-UNIT CHEW Take 2 tablets by mouth daily as needed       Cobalamine Combinations (B-12) 100-5000 MCG SUBL Place 1 tablet under the tongue daily as needed (Patient taking differently: Place 1 tablet under the tongue daily ) 50 tablet 0     diphenhydrAMINE (BENADRYL) 25 MG capsule Take 1 capsule (25 mg) by mouth nightly as needed for sleep 56 capsule      fluticasone (FLONASE) 50 MCG/ACT spray USE 2 SPRAYS IN EACH NOSTRIL DAILY 16 g PRN     levothyroxine (SYNTHROID/LEVOTHROID) 100 MCG tablet TAKE 1 TABLET DAILY 90 tablet 2      "MAGNESIUM PO Take by mouth daily Magnesium 7       multivitamin, therapeutic with minerals (THERA-VIT-M) TABS Take 1 tablet by mouth daily 30 each 0     simvastatin (ZOCOR) 20 MG tablet TAKE 1 TABLET AT BEDTIME 90 tablet 2     valsartan (DIOVAN) 160 MG tablet Take 1 tablet (160 mg) by mouth daily 90 tablet 3       Allergies   Allergen Reactions     No Known Drug Allergies         Social History     Tobacco Use     Smoking status: Never Smoker     Smokeless tobacco: Never Used   Substance Use Topics     Alcohol use: Yes     Comment: very light use       History   Drug Use No         Objective     /76   Pulse 66   Temp 98  F (36.7  C) (Temporal)   Resp 16   Ht 1.651 m (5' 5\")   Wt 122.9 kg (271 lb)   LMP 08/01/2004   SpO2 94%   BMI 45.10 kg/m      Physical Exam    GENERAL APPEARANCE: healthy, alert and no distress     EYES: EOMI, PERRL     HENT: ear canals and TM's normal and nose and mouth without ulcers or lesions     NECK: no adenopathy, no asymmetry, masses, or scars and thyroid normal to palpation     RESP: lungs clear to auscultation - no rales, rhonchi or wheezes     CV: regular rates and rhythm, normal S1 S2, no S3 or S4 and no murmur, click or rub     ABDOMEN:  soft, nontender, no HSM or masses and bowel sounds normal     MS: extremities normal- no gross deformities noted, no evidence of inflammation in joints, FROM in all extremities.     SKIN: no suspicious lesions or rashes     NEURO: Normal strength and tone, sensory exam grossly normal, mentation intact and speech normal     PSYCH: mentation appears normal. and affect normal/bright     LYMPHATICS: No cervical adenopathy    Recent Labs   Lab Test 11/29/21  0938 11/09/20  1002   HGB  --  13.8   PLT  --  220   * 133   POTASSIUM 4.4 4.6   CR 0.84 0.79        Diagnostics:  No labs were ordered during this visit.   No EKG required for low risk surgery (cataract, skin procedure, breast biopsy, etc).    Revised Cardiac Risk Index (RCRI):  The " patient has the following serious cardiovascular risks for perioperative complications:   - No serious cardiac risks = 0 points     RCRI Interpretation: 0 points: Class I (very low risk - 0.4% complication rate)           Signed Electronically by: Corina Lara NP  Copy of this evaluation report is provided to requesting physician.

## 2022-07-17 ENCOUNTER — MYC MEDICAL ADVICE (OUTPATIENT)
Dept: FAMILY MEDICINE | Facility: CLINIC | Age: 72
End: 2022-07-17

## 2022-10-20 NOTE — PATIENT INSTRUCTIONS
Patient Education   Personalized Prevention Plan  You are due for the preventive services outlined below.  Your care team is available to assist you in scheduling these services.  If you have already completed any of these items, please share that information with your care team to update in your medical record.  Health Maintenance Due   Topic Date Due     Thyroid Function Lab  11/13/2020     FALL RISK ASSESSMENT  11/13/2020     Mammogram  11/28/2020         Pulmonary hypertension

## 2022-11-10 ASSESSMENT — ENCOUNTER SYMPTOMS
DIARRHEA: 0
NAUSEA: 0
ARTHRALGIAS: 0
BREAST MASS: 0
DYSURIA: 0
SHORTNESS OF BREATH: 1
ABDOMINAL PAIN: 0
JOINT SWELLING: 0
FREQUENCY: 0
HEARTBURN: 0
EYE PAIN: 0
WEAKNESS: 0
PALPITATIONS: 0
PARESTHESIAS: 0
CONSTIPATION: 0
HEMATURIA: 0
COUGH: 1
HEMATOCHEZIA: 0
CHILLS: 0
MYALGIAS: 0
NERVOUS/ANXIOUS: 0
HEADACHES: 0
SORE THROAT: 0
FEVER: 0
DIZZINESS: 0

## 2022-11-10 ASSESSMENT — ACTIVITIES OF DAILY LIVING (ADL): CURRENT_FUNCTION: NO ASSISTANCE NEEDED

## 2022-11-16 ENCOUNTER — OFFICE VISIT (OUTPATIENT)
Dept: FAMILY MEDICINE | Facility: CLINIC | Age: 72
End: 2022-11-16
Payer: COMMERCIAL

## 2022-11-16 VITALS
HEART RATE: 72 BPM | DIASTOLIC BLOOD PRESSURE: 70 MMHG | BODY MASS INDEX: 40.59 KG/M2 | TEMPERATURE: 97.2 F | RESPIRATION RATE: 20 BRPM | HEIGHT: 68 IN | WEIGHT: 267.8 LBS | OXYGEN SATURATION: 95 % | SYSTOLIC BLOOD PRESSURE: 122 MMHG

## 2022-11-16 DIAGNOSIS — E03.9 HYPOTHYROIDISM, UNSPECIFIED TYPE: ICD-10-CM

## 2022-11-16 DIAGNOSIS — M81.0 OSTEOPOROSIS, UNSPECIFIED OSTEOPOROSIS TYPE, UNSPECIFIED PATHOLOGICAL FRACTURE PRESENCE: ICD-10-CM

## 2022-11-16 DIAGNOSIS — E78.5 HYPERLIPIDEMIA LDL GOAL <130: ICD-10-CM

## 2022-11-16 DIAGNOSIS — R05.9 COUGH, UNSPECIFIED TYPE: ICD-10-CM

## 2022-11-16 DIAGNOSIS — R19.5 LOOSE STOOLS: ICD-10-CM

## 2022-11-16 DIAGNOSIS — Z00.00 ENCOUNTER FOR MEDICARE ANNUAL WELLNESS EXAM: Primary | ICD-10-CM

## 2022-11-16 DIAGNOSIS — I10 BENIGN ESSENTIAL HYPERTENSION: ICD-10-CM

## 2022-11-16 LAB
ANION GAP SERPL CALCULATED.3IONS-SCNC: 12 MMOL/L (ref 7–15)
BUN SERPL-MCNC: 12 MG/DL (ref 8–23)
CALCIUM SERPL-MCNC: 9.5 MG/DL (ref 8.8–10.2)
CHLORIDE SERPL-SCNC: 99 MMOL/L (ref 98–107)
CHOLEST SERPL-MCNC: 179 MG/DL
CREAT SERPL-MCNC: 0.72 MG/DL (ref 0.51–0.95)
DEPRECATED HCO3 PLAS-SCNC: 22 MMOL/L (ref 22–29)
GFR SERPL CREATININE-BSD FRML MDRD: 89 ML/MIN/1.73M2
GLUCOSE SERPL-MCNC: 93 MG/DL (ref 70–99)
HDLC SERPL-MCNC: 72 MG/DL
LDLC SERPL CALC-MCNC: 82 MG/DL
NONHDLC SERPL-MCNC: 107 MG/DL
POTASSIUM SERPL-SCNC: 4.3 MMOL/L (ref 3.4–5.3)
SODIUM SERPL-SCNC: 133 MMOL/L (ref 136–145)
TRIGL SERPL-MCNC: 125 MG/DL
TSH SERPL DL<=0.005 MIU/L-ACNC: 2.84 UIU/ML (ref 0.3–4.2)

## 2022-11-16 PROCEDURE — 99214 OFFICE O/P EST MOD 30 MIN: CPT | Mod: 25 | Performed by: NURSE PRACTITIONER

## 2022-11-16 PROCEDURE — 99397 PER PM REEVAL EST PAT 65+ YR: CPT | Performed by: NURSE PRACTITIONER

## 2022-11-16 PROCEDURE — 84443 ASSAY THYROID STIM HORMONE: CPT | Performed by: NURSE PRACTITIONER

## 2022-11-16 PROCEDURE — 80061 LIPID PANEL: CPT | Performed by: NURSE PRACTITIONER

## 2022-11-16 PROCEDURE — 80048 BASIC METABOLIC PNL TOTAL CA: CPT | Performed by: NURSE PRACTITIONER

## 2022-11-16 PROCEDURE — 36415 COLL VENOUS BLD VENIPUNCTURE: CPT | Performed by: NURSE PRACTITIONER

## 2022-11-16 RX ORDER — VALSARTAN 160 MG/1
160 TABLET ORAL DAILY
Qty: 90 TABLET | Refills: 3 | Status: SHIPPED | OUTPATIENT
Start: 2022-11-16 | End: 2023-11-09

## 2022-11-16 RX ORDER — ALENDRONATE SODIUM 70 MG/1
TABLET ORAL
Qty: 12 TABLET | Refills: 3 | Status: SHIPPED | OUTPATIENT
Start: 2022-11-16 | End: 2023-11-09

## 2022-11-16 RX ORDER — LEVOTHYROXINE SODIUM 100 UG/1
100 TABLET ORAL DAILY
Qty: 90 TABLET | Refills: 3 | Status: SHIPPED | OUTPATIENT
Start: 2022-11-16 | End: 2023-11-09

## 2022-11-16 RX ORDER — SIMVASTATIN 20 MG
20 TABLET ORAL AT BEDTIME
Qty: 90 TABLET | Refills: 3 | Status: SHIPPED | OUTPATIENT
Start: 2022-11-16 | End: 2023-11-09

## 2022-11-16 ASSESSMENT — ENCOUNTER SYMPTOMS
NAUSEA: 0
SORE THROAT: 0
WEAKNESS: 0
CHILLS: 0
HEMATURIA: 0
JOINT SWELLING: 0
DIZZINESS: 0
PALPITATIONS: 0
EYE PAIN: 0
COUGH: 1
NERVOUS/ANXIOUS: 0
SHORTNESS OF BREATH: 1
MYALGIAS: 0
HEMATOCHEZIA: 0
DIARRHEA: 0
DYSURIA: 0
ABDOMINAL PAIN: 0
CONSTIPATION: 0
ARTHRALGIAS: 0
BREAST MASS: 0
HEARTBURN: 0
HEADACHES: 0
FEVER: 0
FREQUENCY: 0
PARESTHESIAS: 0

## 2022-11-16 ASSESSMENT — ACTIVITIES OF DAILY LIVING (ADL): CURRENT_FUNCTION: NO ASSISTANCE NEEDED

## 2022-11-16 NOTE — PROGRESS NOTES
"SUBJECTIVE:   Rhonda is a 72 year old who presents for Preventive Visit.  Patient has been advised of split billing requirements and indicates understanding: Yes  Are you in the first 12 months of your Medicare coverage?  No    Healthy Habits:     In general, how would you rate your overall health?  Good    Frequency of exercise:  2-3 days/week    Duration of exercise:  15-30 minutes    Do you usually eat at least 4 servings of fruit and vegetables a day, include whole grains    & fiber and avoid regularly eating high fat or \"junk\" foods?  No    Taking medications regularly:  Yes    Medication side effects:  None    Ability to successfully perform activities of daily living:  No assistance needed    Home Safety:  No safety concerns identified    Hearing Impairment:  No hearing concerns    In the past 6 months, have you been bothered by leaking of urine? Yes    In general, how would you rate your overall mental or emotional health?  Excellent      PHQ-2 Total Score: 0    Additional concerns today:  Yes    She changed from Lisinopril to Valsartan last year due to a cough.  Her cough resolved, but now has it again somewhat.  She did try Flonase in the past, but not with this cough.    Her blood pressure has been well-controlled on her current medication and she denies any side effects.    She has occasional loose stools, wonders if it is related to something she is eating.  She denies any blood in her stool.    She is doing well on her current dose of levothyroxine.  She denies any symptoms such as fatigue; changes in weight; temperature intolerance; skin changes; constipation.       Have you ever done Advance Care Planning? (For example, a Health Directive, POLST, or a discussion with a medical provider or your loved ones about your wishes): Yes, advance care planning is on file.       Fall risk  Fallen 2 or more times in the past year?: No  Any fall with injury in the past year?: No  click delete button to remove " this line now  Cognitive Screening   1) Repeat 3 items (Leader, Season, Table)    2) Clock draw: NORMAL  3) 3 item recall: Recalls 3 objects  Results: 3 items recalled: COGNITIVE IMPAIRMENT LESS LIKELY    Mini-CogTM Copyright S Mary. Licensed by the author for use in [facility]; reprinted with permission (hanny@[facility]). All rights reserved.      Do you have sleep apnea, excessive snoring or daytime drowsiness?: no    Reviewed and updated as needed this visit by clinical staff   Tobacco  Allergies  Meds  Problems  Med Hx  Surg Hx  Fam Hx          Reviewed and updated as needed this visit by Provider   Tobacco  Allergies  Meds  Problems  Med Hx  Surg Hx  Fam Hx         Social History     Tobacco Use     Smoking status: Never     Smokeless tobacco: Never   Substance Use Topics     Alcohol use: Yes     Comment: very light use     If you drink alcohol do you typically have >3 drinks per day or >7 drinks per week? No    Alcohol Use 11/16/2022   Prescreen: >3 drinks/day or >7 drinks/week? -   Prescreen: >3 drinks/day or >7 drinks/week? No               Current providers sharing in care for this patient include:   Patient Care Team:  Corina Lara NP as PCP - General  Corina Lara NP as Assigned PCP  Jaimie Wilson DPM, Podiatry/Foot and Ankle Surgery as Assigned Musculoskeletal Provider    The following health maintenance items are reviewed in Epic and correct as of today:  Health Maintenance   Topic Date Due     DTAP/TDAP/TD IMMUNIZATION (3 - Td or Tdap) 05/06/2021     MEDICARE ANNUAL WELLNESS VISIT  11/29/2022     BMP  11/29/2022     TSH W/FREE T4 REFLEX  11/29/2022     MAMMO SCREENING  12/09/2022     COLORECTAL CANCER SCREENING  05/03/2023     ANNUAL REVIEW OF HM ORDERS  11/16/2023     FALL RISK ASSESSMENT  11/16/2023     LIPID  11/29/2026     ADVANCE CARE PLANNING  11/16/2027     DEXA  11/15/2036     HEPATITIS C SCREENING  Completed     PHQ-2 (once per calendar year)   "Completed     INFLUENZA VACCINE  Completed     Pneumococcal Vaccine: 65+ Years  Completed     ZOSTER IMMUNIZATION  Completed     COVID-19 Vaccine  Completed     IPV IMMUNIZATION  Aged Out     MENINGITIS IMMUNIZATION  Aged Out               Review of Systems   Constitutional: Negative for chills and fever.   HENT: Negative for congestion, ear pain, hearing loss and sore throat.    Eyes: Negative for pain and visual disturbance.   Respiratory: Positive for cough and shortness of breath.    Cardiovascular: Negative for chest pain, palpitations and peripheral edema.   Gastrointestinal: Negative for abdominal pain, constipation, diarrhea, heartburn, hematochezia and nausea.   Breasts:  Negative for tenderness, breast mass and discharge.   Genitourinary: Negative for dysuria, frequency, genital sores, hematuria, pelvic pain, urgency, vaginal bleeding and vaginal discharge.   Musculoskeletal: Negative for arthralgias, joint swelling and myalgias.   Skin: Negative for rash.   Neurological: Negative for dizziness, weakness, headaches and paresthesias.   Psychiatric/Behavioral: Negative for mood changes. The patient is not nervous/anxious.          OBJECTIVE:   /70 (BP Location: Left arm, Patient Position: Sitting, Cuff Size: Adult Large)   Pulse 72   Temp 97.2  F (36.2  C) (Temporal)   Resp 20   Ht 1.727 m (5' 8\")   Wt 121.5 kg (267 lb 12.8 oz)   LMP 08/01/2004   SpO2 95%   BMI 40.72 kg/m   Estimated body mass index is 40.72 kg/m  as calculated from the following:    Height as of this encounter: 1.727 m (5' 8\").    Weight as of this encounter: 121.5 kg (267 lb 12.8 oz).  Physical Exam  GENERAL: healthy, alert and no distress  EYES: Eyes grossly normal to inspection, PERRL and conjunctivae and sclerae normal  HENT: ear canals and TM's normal, nose and mouth without ulcers or lesions  NECK: no adenopathy, no asymmetry, masses, or scars and thyroid normal to palpation  RESP: lungs clear to auscultation - no rales, " rhonchi or wheezes  CV: regular rate and rhythm, normal S1 S2, no S3 or S4, no murmur, click or rub, no peripheral edema and peripheral pulses strong  ABDOMEN: soft, nontender, no hepatosplenomegaly, no masses and bowel sounds normal  MS: no gross musculoskeletal defects noted, no edema  SKIN: no suspicious lesions or rashes  NEURO: Normal strength and tone, mentation intact and speech normal  PSYCH: mentation appears normal, affect normal/bright        ASSESSMENT / PLAN:   (Z00.00) Encounter for Medicare annual wellness exam  (primary encounter diagnosis)  Comment:   Plan:     (I10) Benign essential hypertension  Comment: at goal  Plan: BASIC METABOLIC PANEL        The current medical regimen is effective;  continue present plan and medications.     (E03.9) Hypothyroidism, unspecified type  Comment: stable  Plan: TSH WITH FREE T4 REFLEX, levothyroxine         (SYNTHROID/LEVOTHROID) 100 MCG tablet        The current medical regimen is effective;  continue present plan and medications.     (E78.5) Hyperlipidemia LDL goal <130  Comment: stable  Plan: simvastatin (ZOCOR) 20 MG tablet, Lipid panel         reflex to direct LDL Fasting        The current medical regimen is effective;  continue present plan and medications.     (R05.9) Cough, unspecified type  Comment:   Plan: Try Flonase daily for a month.  Silent GERD is also in the differential.  If symptoms persist, may try changing valsartan.     (M81.0) Osteoporosis, unspecified osteoporosis type, unspecified pathological fracture presence  Comment:   Plan: alendronate (FOSAMAX) 70 MG tablet        The current medical regimen is effective;  continue present plan and medications.     (R19.5) Loose stools  Comment:   Plan: Schedule colonoscopy in May as she is due.  Printed and discussed a low-FODMAP diet.           COUNSELING:  Reviewed preventive health counseling, as reflected in patient instructions      BMI:   Estimated body mass index is 40.72 kg/m  as calculated  "from the following:    Height as of this encounter: 1.727 m (5' 8\").    Weight as of this encounter: 121.5 kg (267 lb 12.8 oz).   Weight management plan: Discussed healthy diet and exercise guidelines      She reports that she has never smoked. She has never used smokeless tobacco.      Appropriate preventive services were discussed with this patient, including applicable screening as appropriate for cardiovascular disease, diabetes, osteopenia/osteoporosis, and glaucoma.  As appropriate for age/gender, discussed screening for colorectal cancer, prostate cancer, breast cancer, and cervical cancer. Checklist reviewing preventive services available has been given to the patient.    Reviewed patients plan of care and provided an AVS. The Basic Care Plan (routine screening as documented in Health Maintenance) for Rhonda meets the Care Plan requirement. This Care Plan has been established and reviewed with the Patient.      Corina Lara NP  [facility]    Identified Health Risks:  "

## 2022-11-16 NOTE — PATIENT INSTRUCTIONS
Patient Education   Personalized Prevention Plan  You are due for the preventive services outlined below.  Your care team is available to assist you in scheduling these services.  If you have already completed any of these items, please share that information with your care team to update in your medical record.  Health Maintenance Due   Topic Date Due     Diptheria Tetanus Pertussis (DTAP/TDAP/TD) Vaccine (3 - Td or Tdap) 05/06/2021     Annual Wellness Visit  11/29/2022     Basic Metabolic Panel  11/29/2022     Thyroid Function Lab  11/29/2022     ANNUAL REVIEW OF HM ORDERS  11/29/2022     Mammogram  12/09/2022

## 2022-11-17 ENCOUNTER — HOSPITAL ENCOUNTER (OUTPATIENT)
Dept: MAMMOGRAPHY | Facility: CLINIC | Age: 72
Discharge: HOME | End: 2022-11-17
Attending: NURSE PRACTITIONER | Admitting: NURSE PRACTITIONER
Payer: COMMERCIAL

## 2022-11-17 DIAGNOSIS — Z12.31 VISIT FOR SCREENING MAMMOGRAM: ICD-10-CM

## 2022-11-17 PROCEDURE — 77067 SCR MAMMO BI INCL CAD: CPT

## 2023-02-15 ENCOUNTER — OFFICE VISIT (OUTPATIENT)
Dept: URGENT CARE | Facility: URGENT CARE | Age: 73
End: 2023-02-15
Payer: COMMERCIAL

## 2023-02-15 ENCOUNTER — ANCILLARY PROCEDURE (OUTPATIENT)
Dept: GENERAL RADIOLOGY | Facility: CLINIC | Age: 73
End: 2023-02-15
Attending: FAMILY MEDICINE
Payer: COMMERCIAL

## 2023-02-15 ENCOUNTER — MYC MEDICAL ADVICE (OUTPATIENT)
Dept: FAMILY MEDICINE | Facility: CLINIC | Age: 73
End: 2023-02-15

## 2023-02-15 VITALS
SYSTOLIC BLOOD PRESSURE: 138 MMHG | TEMPERATURE: 97.8 F | BODY MASS INDEX: 40.6 KG/M2 | WEIGHT: 267 LBS | RESPIRATION RATE: 24 BRPM | OXYGEN SATURATION: 95 % | HEART RATE: 71 BPM | DIASTOLIC BLOOD PRESSURE: 79 MMHG

## 2023-02-15 DIAGNOSIS — R05.1 ACUTE COUGH: ICD-10-CM

## 2023-02-15 DIAGNOSIS — J18.9 PNEUMONIA OF RIGHT LOWER LOBE DUE TO INFECTIOUS ORGANISM: Primary | ICD-10-CM

## 2023-02-15 DIAGNOSIS — R06.2 WHEEZING: ICD-10-CM

## 2023-02-15 PROCEDURE — 71046 X-RAY EXAM CHEST 2 VIEWS: CPT | Mod: TC | Performed by: RADIOLOGY

## 2023-02-15 PROCEDURE — 99214 OFFICE O/P EST MOD 30 MIN: CPT | Performed by: FAMILY MEDICINE

## 2023-02-15 RX ORDER — AZITHROMYCIN 250 MG/1
TABLET, FILM COATED ORAL
Qty: 6 TABLET | Refills: 0 | Status: SHIPPED | OUTPATIENT
Start: 2023-02-15 | End: 2023-02-20

## 2023-02-15 NOTE — PROGRESS NOTES
URGENT CARE VISIT:    ASSESSMENT AND PLAN:      ICD-10-CM    1. Pneumonia of right lower lobe due to infectious organism  J18.9       2. Acute cough  R05.1 XR Chest 2 Views     amoxicillin-clavulanate (AUGMENTIN) 875-125 MG tablet     azithromycin (ZITHROMAX) 250 MG tablet      3. Wheezing  R06.2 XR Chest 2 Views     amoxicillin-clavulanate (AUGMENTIN) 875-125 MG tablet     azithromycin (ZITHROMAX) 250 MG tablet          Differentials include but not limited to COVID-19, Bronchitis-viral, Influenza, Pneumonia, Viral upper respiratory illness, pertussis, etc. CXR reviewed and interpreted by me with infiltrates treat with Augmentin and azithromycin 5 days; side effects of medication antibiotics was discussed.  In the meantime I have encouraged her to utilize humidifier, over-the-counter cough medicine, and rest as needed.    Red flag symptoms for urgent evaluation via ED shared.      Follow up with primary care provider with any problems, questions or concerns or if symptoms worsen or fail to improve. Patient verbalized understanding and is agreeable to plan. The patient was discharged ambulatory and in stable condition.    SUBJECTIVE:   Rhonda Trotter is a 72 year old female presenting for chief complaint of cough - productive and wheezing that has been persistent.  Onset was 7 day(s) ago.   She denies the following symptoms: fever, chills, shortness of breath, facial pain/pressure, headache, body aches, fatigue, nausea, vomiting and diarrhea, or chest pain.   Course of illness is same.    Treatment measures tried include Tylenol/Ibuprofen with some relief of symptoms.  Predisposing factors include None.      Home testing: COVID test home x2 (NEGATIVE)    PMH:   Past Medical History:   Diagnosis Date     Arthritis      Obesity, unspecified      Pure hypercholesterolemia      Unspecified essential hypertension      Unspecified hypothyroidism      Allergies: No known drug allergies   Medications:   Current  Outpatient Medications   Medication Sig Dispense Refill     alendronate (FOSAMAX) 70 MG tablet TAKE 1 TABLET EVERY 7 DAYS WITH 8 OUNCES OF WATER 30 MINUTES BEFORE BREAKFAST AND REMAIN UPRIGHT DURING THIS TIME 12 tablet 3     amoxicillin-clavulanate (AUGMENTIN) 875-125 MG tablet Take 1 tablet by mouth 2 times daily for 5 days 10 tablet 0     azithromycin (ZITHROMAX) 250 MG tablet Take 2 tablets (500 mg) by mouth daily for 1 day, THEN 1 tablet (250 mg) daily for 4 days. 6 tablet 0     calcium carbonate (TUMS) 500 MG chewable tablet Take 1-2 chew tab by mouth daily as needed for heartburn       Calcium-Phosphorus-Vitamin D (CALCIUM GUMMIES) 250-100-500 MG-MG-UNIT CHEW Take 2 tablets by mouth daily as needed       Cobalamine Combinations (B-12) 100-5000 MCG SUBL Place 1 tablet under the tongue daily as needed (Patient taking differently: Place 1 tablet under the tongue daily) 50 tablet 0     diphenhydrAMINE (BENADRYL) 25 MG capsule Take 1 capsule (25 mg) by mouth nightly as needed for sleep 56 capsule      fluticasone (FLONASE) 50 MCG/ACT spray USE 2 SPRAYS IN EACH NOSTRIL DAILY 16 g PRN     levothyroxine (SYNTHROID/LEVOTHROID) 100 MCG tablet Take 1 tablet (100 mcg) by mouth daily 90 tablet 3     MAGNESIUM PO Take by mouth daily Magnesium 7       multivitamin, therapeutic with minerals (THERA-VIT-M) TABS Take 1 tablet by mouth daily 30 each 0     simvastatin (ZOCOR) 20 MG tablet Take 1 tablet (20 mg) by mouth At Bedtime 90 tablet 3     valsartan (DIOVAN) 160 MG tablet Take 1 tablet (160 mg) by mouth daily 90 tablet 3     Social History:   Social History     Tobacco Use     Smoking status: Never     Smokeless tobacco: Never   Substance Use Topics     Alcohol use: Yes     Comment: very light use       ROS:  Review of systems negative except as stated above.    OBJECTIVE:  /79   Pulse 71   Temp 97.8  F (36.6  C) (Tympanic)   Resp 24   Wt 121.1 kg (267 lb)   LMP 08/01/2004   SpO2 95%   BMI 40.60 kg/m       GENERAL APPEARANCE: healthy, alert and no distress  EYES: EOMI,  PERRL, conjunctiva clear  HENT: ear canals and TM's normal.  Nose and mouth without ulcers, erythema or lesions  NECK: supple, nontender, no lymphadenopathy  RESP: rhonchi bilateral lower lobes.  No respiratory distress or labored breathing.   CV: regular rates and rhythm, normal S1 S2, no murmur noted  PSYCH: mentation appears normal and affect normal/bright    Labs:      CXR - Reviewed and interpreted by me Infiltrate seen in the right lower lobe

## 2023-02-19 ENCOUNTER — MYC MEDICAL ADVICE (OUTPATIENT)
Dept: FAMILY MEDICINE | Facility: CLINIC | Age: 73
End: 2023-02-19
Payer: COMMERCIAL

## 2023-04-15 ENCOUNTER — MYC MEDICAL ADVICE (OUTPATIENT)
Dept: FAMILY MEDICINE | Facility: CLINIC | Age: 73
End: 2023-04-15
Payer: COMMERCIAL

## 2023-04-17 ENCOUNTER — ANCILLARY PROCEDURE (OUTPATIENT)
Dept: GENERAL RADIOLOGY | Facility: CLINIC | Age: 73
End: 2023-04-17
Attending: NURSE PRACTITIONER
Payer: COMMERCIAL

## 2023-04-17 ENCOUNTER — OFFICE VISIT (OUTPATIENT)
Dept: FAMILY MEDICINE | Facility: CLINIC | Age: 73
End: 2023-04-17
Payer: COMMERCIAL

## 2023-04-17 VITALS
SYSTOLIC BLOOD PRESSURE: 138 MMHG | HEART RATE: 79 BPM | DIASTOLIC BLOOD PRESSURE: 78 MMHG | OXYGEN SATURATION: 96 % | HEIGHT: 66 IN | TEMPERATURE: 97.6 F | WEIGHT: 270 LBS | BODY MASS INDEX: 43.39 KG/M2 | RESPIRATION RATE: 18 BRPM

## 2023-04-17 DIAGNOSIS — J18.9 PNEUMONIA OF BOTH LOWER LOBES DUE TO INFECTIOUS ORGANISM: ICD-10-CM

## 2023-04-17 DIAGNOSIS — E66.01 MORBID OBESITY WITH BMI OF 40.0-44.9, ADULT (H): ICD-10-CM

## 2023-04-17 DIAGNOSIS — J18.9 PNEUMONIA OF BOTH LOWER LOBES DUE TO INFECTIOUS ORGANISM: Primary | ICD-10-CM

## 2023-04-17 DIAGNOSIS — N30.00 ACUTE CYSTITIS WITHOUT HEMATURIA: ICD-10-CM

## 2023-04-17 LAB
ALBUMIN UR-MCNC: NEGATIVE MG/DL
APPEARANCE UR: ABNORMAL
BACTERIA #/AREA URNS HPF: ABNORMAL /HPF
BILIRUB UR QL STRIP: NEGATIVE
COLOR UR AUTO: YELLOW
GLUCOSE UR STRIP-MCNC: NEGATIVE MG/DL
HGB UR QL STRIP: ABNORMAL
KETONES UR STRIP-MCNC: NEGATIVE MG/DL
LEUKOCYTE ESTERASE UR QL STRIP: ABNORMAL
NITRATE UR QL: NEGATIVE
PH UR STRIP: 6.5 [PH] (ref 5–7)
RBC #/AREA URNS AUTO: ABNORMAL /HPF
SP GR UR STRIP: 1.01 (ref 1–1.03)
SQUAMOUS #/AREA URNS AUTO: ABNORMAL /LPF
UROBILINOGEN UR STRIP-ACNC: 0.2 E.U./DL
WBC #/AREA URNS AUTO: >100 /HPF

## 2023-04-17 PROCEDURE — 87086 URINE CULTURE/COLONY COUNT: CPT | Performed by: NURSE PRACTITIONER

## 2023-04-17 PROCEDURE — 99215 OFFICE O/P EST HI 40 MIN: CPT | Performed by: NURSE PRACTITIONER

## 2023-04-17 PROCEDURE — 81001 URINALYSIS AUTO W/SCOPE: CPT | Performed by: NURSE PRACTITIONER

## 2023-04-17 PROCEDURE — 71046 X-RAY EXAM CHEST 2 VIEWS: CPT | Mod: TC | Performed by: RADIOLOGY

## 2023-04-17 PROCEDURE — 87186 SC STD MICRODIL/AGAR DIL: CPT | Performed by: NURSE PRACTITIONER

## 2023-04-17 RX ORDER — NITROFURANTOIN 25; 75 MG/1; MG/1
100 CAPSULE ORAL 2 TIMES DAILY
Qty: 10 CAPSULE | Refills: 0 | Status: SHIPPED | OUTPATIENT
Start: 2023-04-17 | End: 2023-04-22

## 2023-04-17 ASSESSMENT — PAIN SCALES - GENERAL: PAINLEVEL: NO PAIN (0)

## 2023-04-17 NOTE — PROGRESS NOTES
Assessment & Plan     (J18.9) Pneumonia of both lower lobes due to infectious organism  (primary encounter diagnosis)  Comment: resolved  Plan: XR Chest 2 Views        Clinically improved and xray has improved.  Will await radiology report.      (N30.00) Acute cystitis without hematuria  Comment:   Plan: UA Macro with Reflex to Micro and Culture - lab        collect, Urine Microscopic Exam, Urine Culture,        nitroFURantoin macrocrystal-monohydrate         (MACROBID) 100 MG capsule        Discussed the use and indication of this medication as well as potential side effects.     (E66.01,  Z68.41) Morbid obesity with BMI of 40.0-44.9, adult (H)  Comment:   Plan: Discussed diet and exercise.       44 minutes spent by me on the date of the encounter doing chart review, patient visit and documentation            Corina Lara NP  [facility]    Brandon Ellis is a 72 year old, presenting for the following health issues:  Follow Up        4/17/2023     3:16 PM   Additional Questions   Roomed by Leila     History of Present Illness       Reason for visit:  Follow up on pneumonia diagnosis 2/15/23, do I need to have a chest X-ray?    She eats 4 or more servings of fruits and vegetables daily.She consumes 0 sweetened beverage(s) daily.She exercises with enough effort to increase her heart rate 10 to 19 minutes per day.  She exercises with enough effort to increase her heart rate 3 or less days per week.   She is taking medications regularly.     She was treated for pneumonia two months ago.  She felt much improved after one week.  It took a bit longer for the cough to fully resolve.  The wheezing has also resolved, no fevers.   The CXR showed reticular opacities in mid and lower lungs bilaterally.    Her sister had pulmonary fibrosis.    For the past week, she has had some urgency and very mild dysuria.  She denies any flank pain or fevers.     She is scheduled for a colonoscopy  "in June (6/13).             Review of Systems         Objective    /78   Pulse 79   Temp 97.6  F (36.4  C) (Temporal)   Resp 18   Ht 1.676 m (5' 6\")   Wt 122.5 kg (270 lb)   LMP 08/01/2004   SpO2 96%   BMI 43.58 kg/m    Body mass index is 43.58 kg/m .  Physical Exam   GENERAL: healthy, alert and no distress  RESP: lungs clear to auscultation - no rales, rhonchi or wheezes  CV: regular rate and rhythm, normal S1 S2, no S3 or S4, no murmur, click or rub, no peripheral edema and peripheral pulses strong  BACK: no CVA tenderness, no paralumbar tenderness  PSYCH: mentation appears normal, affect normal/bright    Results for orders placed or performed in visit on 04/17/23 (from the past 24 hour(s))   UA Macro with Reflex to Micro and Culture - lab collect    Specimen: Urine, Midstream   Result Value Ref Range    Color Urine Yellow Colorless, Straw, Light Yellow, Yellow    Appearance Urine Slightly Cloudy (A) Clear    Glucose Urine Negative Negative mg/dL    Bilirubin Urine Negative Negative    Ketones Urine Negative Negative mg/dL    Specific Gravity Urine 1.015 1.003 - 1.035    Blood Urine Small (A) Negative    pH Urine 6.5 5.0 - 7.0    Protein Albumin Urine Negative Negative mg/dL    Urobilinogen Urine 0.2 0.2, 1.0 E.U./dL    Nitrite Urine Negative Negative    Leukocyte Esterase Urine Large (A) Negative   Urine Microscopic Exam   Result Value Ref Range    Bacteria Urine Moderate (A) None Seen /HPF    RBC Urine 2-5 (A) 0-2 /HPF /HPF    WBC Urine >100 (A) 0-5 /HPF /HPF    Squamous Epithelials Urine Few (A) None Seen /LPF                   "

## 2023-04-18 LAB — BACTERIA UR CULT: ABNORMAL

## 2023-04-19 ENCOUNTER — MYC MEDICAL ADVICE (OUTPATIENT)
Dept: FAMILY MEDICINE | Facility: CLINIC | Age: 73
End: 2023-04-19
Payer: COMMERCIAL

## 2023-04-19 DIAGNOSIS — R93.89 ABNORMAL CHEST X-RAY: Primary | ICD-10-CM

## 2023-04-21 ENCOUNTER — HOSPITAL ENCOUNTER (OUTPATIENT)
Dept: CT IMAGING | Facility: CLINIC | Age: 73
Discharge: HOME | End: 2023-04-21
Attending: NURSE PRACTITIONER | Admitting: NURSE PRACTITIONER
Payer: COMMERCIAL

## 2023-04-21 DIAGNOSIS — R93.89 ABNORMAL CHEST X-RAY: ICD-10-CM

## 2023-04-21 PROCEDURE — 71250 CT THORAX DX C-: CPT

## 2023-04-25 DIAGNOSIS — R93.89 ABNORMAL CHEST X-RAY: Primary | ICD-10-CM

## 2023-04-25 NOTE — Clinical Note
Future Appointments 8/4/2023   8:00 AM     PULMONARY FUNCTION      [facility]               8/4/2023   9:00 AM    [facility] 11/29/2023 9:00 AM    Corina Lara NP     [facility]               [facility]

## 2023-04-26 NOTE — TELEPHONE ENCOUNTER
"This was pt's response on 4/25/23 tristin Bailey RN      \"I was supposed to get a call by end of day yesterday to schedule an appointment with a pulmonologist, but didn't get a call. Just now I called [phone], but ended up getting through to [phone]. The woman I spoke to said she checked with Corina's referral so I know somehow it was the right place. They are booked way out so I can't get in until August 4. I'll have a pulmonary lung function test and then meet with the doctor. She said the doctor would be Dr. Torres. She also put me on the wait list. So in the meantime, I would like to know what I should or should not be doing. My intent is to improve my diet and to keep up with exercise. Is there anything you can advise me in both of those areas? I'm going to pursue a balanced diet that includes lots of fruit and vegetables, whole grains, protein, and some treats just to keep me happy! I plan to continue walking in my building and outside aiming for 5,000 steps a day and hopefully I can do more, I'll start with 3,000 and work up to more. I wanted to wait to tell more of my friends about what is happening until I could have an appointment, but I can use all the prayer I can get so I will let people know now.\"    "

## 2023-04-26 NOTE — TELEPHONE ENCOUNTER
Her plan sounds like a good one.   Can you also give her the number to [facility] and she can see if they have openings sooner?

## 2023-04-27 NOTE — TELEPHONE ENCOUNTER
Responded to the patient through MyChart.     Amanda Perez RN  [facility]

## 2023-05-01 NOTE — TELEPHONE ENCOUNTER
Writer responded via [employer].    MALICK RendonN, RN-BC  [facility]

## 2023-05-19 ENCOUNTER — ANCILLARY PROCEDURE (OUTPATIENT)
Dept: GENERAL RADIOLOGY | Facility: CLINIC | Age: 73
End: 2023-05-19
Attending: FAMILY MEDICINE
Payer: COMMERCIAL

## 2023-05-19 ENCOUNTER — OFFICE VISIT (OUTPATIENT)
Dept: URGENT CARE | Facility: URGENT CARE | Age: 73
End: 2023-05-19
Payer: COMMERCIAL

## 2023-05-19 VITALS
HEART RATE: 75 BPM | WEIGHT: 270 LBS | SYSTOLIC BLOOD PRESSURE: 150 MMHG | OXYGEN SATURATION: 95 % | TEMPERATURE: 97.9 F | BODY MASS INDEX: 43.58 KG/M2 | DIASTOLIC BLOOD PRESSURE: 80 MMHG | RESPIRATION RATE: 18 BRPM

## 2023-05-19 DIAGNOSIS — J84.10 PULMONARY FIBROSIS, UNSPECIFIED (H): ICD-10-CM

## 2023-05-19 DIAGNOSIS — R09.89 CHEST CONGESTION: ICD-10-CM

## 2023-05-19 DIAGNOSIS — R05.9 COUGH, UNSPECIFIED TYPE: Primary | ICD-10-CM

## 2023-05-19 PROCEDURE — 71046 X-RAY EXAM CHEST 2 VIEWS: CPT | Mod: TC | Performed by: RADIOLOGY

## 2023-05-19 PROCEDURE — 99214 OFFICE O/P EST MOD 30 MIN: CPT | Performed by: FAMILY MEDICINE

## 2023-05-19 NOTE — PROGRESS NOTES
Chief Complaint   Patient presents with     Cough     Cough and chest congestion-was here in February and had pneumonia-same sx's       Rhonda was seen today for cough.    Diagnoses and all orders for this visit:    Cough, unspecified type  -     XR Chest 2 Views    Chest congestion    Pulmonary fibrosis, unspecified (H)        D/d  Acute Bronchitis  COPD exacerbation  Pneumonia  Upper Respiratory Infection    PLAN:  See orders in Epic  Symptomatic measures encouraged, humidified air, plenty of fluids.  Discussed with patient as the x-ray did not show changes in the lung findings from the previous one done in April would not do any antibiotic for the timing  Results for orders placed or performed in visit on 05/19/23   XR Chest 2 Views     Status: None    Narrative    CHEST TWO VIEWS    5/19/2023 3:35 PM     HISTORY: Coughing, history of basilar fibrosis per CT. Cough,  unspecified type.    COMPARISON: Chest CT on 4/21/2023      Impression    IMPRESSION: PA and lateral films of the chest were obtained. Mild  enlargement of the cardiac silhouette. Mild basilar predominant  interstitial pulmonary opacities, could be atelectatic or represent  mild interstitial lung disease. No significant pleural effusion or  pneumothorax. Diffuse osteopenia with a few compression deformities  most prominent at T7 and T12.    JUANITA CASTELLON MD         SYSTEM ID:  [id number]     Patient does understand if symptoms worsen and if cough gets worse or has any fever chest pain should follow-up for further evaluation and treatment.    SUBJECTIVE:  Rhonda Trotter is a 72 year old female with h/o fibrosis  who presents to the clinic today with a chief complaint of cough  and chest congestion  for 4 day(s).  Her cough is described as occasional.    The patient's symptoms are moderate and not changing over the course of time.  Associated symptoms include congestion. The patient's symptoms are exacerbated by no particular triggers   Patient has been using nothing  to improve symptoms.    Past Medical History:   Diagnosis Date     Arthritis      Obesity, unspecified      Pure hypercholesterolemia      Unspecified essential hypertension      Unspecified hypothyroidism        Current Outpatient Medications   Medication Sig Dispense Refill     alendronate (FOSAMAX) 70 MG tablet TAKE 1 TABLET EVERY 7 DAYS WITH 8 OUNCES OF WATER 30 MINUTES BEFORE BREAKFAST AND REMAIN UPRIGHT DURING THIS TIME 12 tablet 3     calcium carbonate (TUMS) 500 MG chewable tablet Take 1-2 chew tab by mouth daily as needed for heartburn       Cobalamine Combinations (B-12) 100-5000 MCG SUBL Place 1 tablet under the tongue daily as needed 50 tablet 0     diphenhydrAMINE (BENADRYL) 25 MG capsule Take 1 capsule (25 mg) by mouth nightly as needed for sleep 56 capsule      fluticasone (FLONASE) 50 MCG/ACT spray USE 2 SPRAYS IN EACH NOSTRIL DAILY 16 g PRN     levothyroxine (SYNTHROID/LEVOTHROID) 100 MCG tablet Take 1 tablet (100 mcg) by mouth daily 90 tablet 3     MAGNESIUM PO Take by mouth daily Magnesium 7       multivitamin, therapeutic with minerals (THERA-VIT-M) TABS Take 1 tablet by mouth daily 30 each 0     simvastatin (ZOCOR) 20 MG tablet Take 1 tablet (20 mg) by mouth At Bedtime 90 tablet 3     valsartan (DIOVAN) 160 MG tablet Take 1 tablet (160 mg) by mouth daily 90 tablet 3     Calcium-Phosphorus-Vitamin D (CALCIUM GUMMIES) 250-100-500 MG-MG-UNIT CHEW Take 2 tablets by mouth daily as needed (Patient not taking: Reported on 4/17/2023)         Social History     Tobacco Use     Smoking status: Never     Smokeless tobacco: Never   Vaping Use     Vaping status: Never Used     Passive vaping exposure: Yes   Substance Use Topics     Alcohol use: Yes     Comment: very light use       ROS  Review of systems negative except as stated above.    OBJECTIVE:  BP (!) 150/80   Pulse 75   Temp 97.9  F (36.6  C) (Tympanic)   Resp 18   Wt 122.5 kg (270 lb)   LMP 08/01/2004   SpO2 95%    BMI 43.58 kg/m    GENERAL APPEARANCE: healthy, alert and no distress  EYES: EOMI,  PERRL, conjunctiva clear  HENT: ear canals and TM's normal.  Nose and mouth without ulcers, erythema or lesions  NECK: supple, nontender, no lymphadenopathy  RESP: lungs good air entry positive for -rales in lower lung fields ,occasional wheezes in the right upper lung field   CV: regular rates and rhythm, normal S1 S2, no murmur noted  PSYCH: mentation appears normal    Marimar Toure MD

## 2023-06-13 ENCOUNTER — MYC MEDICAL ADVICE (OUTPATIENT)
Dept: FAMILY MEDICINE | Facility: CLINIC | Age: 73
End: 2023-06-13
Payer: COMMERCIAL

## 2023-07-26 ENCOUNTER — ANCILLARY PROCEDURE (OUTPATIENT)
Dept: GENERAL RADIOLOGY | Facility: CLINIC | Age: 73
End: 2023-07-26
Attending: STUDENT IN AN ORGANIZED HEALTH CARE EDUCATION/TRAINING PROGRAM
Payer: COMMERCIAL

## 2023-07-26 DIAGNOSIS — R93.89 ABNORMAL CHEST X-RAY: ICD-10-CM

## 2023-07-26 PROCEDURE — 71046 X-RAY EXAM CHEST 2 VIEWS: CPT | Mod: TC | Performed by: RADIOLOGY

## 2023-08-03 ENCOUNTER — TELEPHONE (OUTPATIENT)
Dept: PULMONOLOGY | Facility: CLINIC | Age: 73
End: 2023-08-03
Payer: COMMERCIAL

## 2023-08-03 NOTE — TELEPHONE ENCOUNTER
Writer followed up with patient on questions. Patient does not have methacholine challenge test but rather has full PFT's on 8/4/23. Writer explained what to expect with PFT's. All questions answered to patient's satisfaction    Jaleel Leyva RN

## 2023-08-03 NOTE — PROGRESS NOTES
Pulmonary Clinic Note    Date of Service: 8/4/2023     Chief Complaint   Patient presents with    Consult       A/P:  73F HTN, HLD, hypothyroidism being seen for abnormal chest imaging. CT imaging in April w/ mild to moderate peripheral and basilar predominant fibrosis. PFT testing today w/ moderate restriction and moderate reduction in DLCOunc. She has no known underlying condition that would predispose her to pulmonary fibrosis, but further evaluation required. Her sister did have pulmonary fibrosis and pulmonary fibrosis can be familial, no specific work-up required for this. Her sister did have known underlying connective tissue disease that would predispose her to fibrosis. We will do additional testing today for interstitial lung disease and then monitor pulmonary function prior to consideration of antifibrotics or other disease specific treatment.     - send ILD panel  - 6MWT  - repeat PFTs in 6 months  - OK to substitute medications based on formulary     History:  73F HTN, HLD, hypothyroidism being seen for abnormal chest imaging. Had chest imaging after a bout with pneumonia in April. Currently, she denies SOB. Some KHAN w/ moderate exertion, but she attributes this to obesity. She has been trying to walk and exercise more and exercise capacity is improving. Checks SpO2, has seen as low as ~85%, but recovers quickly. No chest pain or tightness. No wheezing. Did have wheezing w/ pneumonia. Does have AM cough, she attributes to post-nasal gtt. No new joint aches or rashes. She denies autoimmune disease. No personal hx of pulmonary disease. She has previously had albuterol when she was sick    Sister had pulmonary fibrosis and history of CTD.     Smoking: never  Hot tub exposure: no       Bird exposure: Parakeet as a child, otherwise no             Animal exposure: no        Inhalation exposure: no    Occupation: retired, former office work                          10 point review of systems negative, aside  "from that mentioned in HPI.    BP (!) 163/79   Pulse 74   Ht 1.676 m (5' 6\")   Wt 121.1 kg (267 lb)   LMP 2004   SpO2 95%   BMI 43.09 kg/m    Gen: well-appearing  HEENT: anicteric  Card: RRR  Pulm: clear bilaterally   Abd: soft  MSK: no edema, no acute joint abnormality   Skin: no obvious rash  Psych: normal affect  Neuro: alert and oriented     Labs:  Personally reviewed    Imaging/Studies: Personally reviewed  PFTs (2023) - moderate restriction, moderate reduction in DLCOunc  CT Chest (2023) - mild-moderate peripheral and basilar fibrosis     Past Medical History:   Diagnosis Date    Arthritis     Obesity, unspecified     Pure hypercholesterolemia     Unspecified essential hypertension     Unspecified hypothyroidism      Past Surgical History:   Procedure Laterality Date    ARTHROPLASTY KNEE Left 2/3/2016    Procedure: ARTHROPLASTY KNEE;  Surgeon: Abdiel Ledesma MD;  Location: [facility] OR    ARTHROPLASTY KNEE Right 10/11/2016    Procedure: ARTHROPLASTY KNEE;  Surgeon: Abdiel Ledesma MD;  Location:  OR    BIOPSY  2014    breast biopsy was negative    COLONOSCOPY  2013    every 5 years starting at age 52    orif Left     wrist    SURGICAL HISTORY OF -       wisdom teeth extraction     Family History   Problem Relation Age of Onset    C.A.D. Father         MI's x 2    Hypertension Father     Hyperlipidemia Father     Prostate Cancer Father     Cancer - colorectal Mother         diagnosed age 55    Heart Disease Mother         mi    Colon Cancer Mother     Obesity Mother     Circulatory Sister         aortic stenosis; Raynauds    Lipids Sister         on Lipitor    Respiratory Sister         pulmonary fibrosis    Hypertension Sister         lost wt and off bp meds    C.A.D. Brother          of MI    Heart Disease Brother 53        mi    Hypertension Brother     Respiratory Paternal Uncle         Tb    Heart Disease Paternal Uncle     Gynecology Daughter     Hypertension Sister     " Hyperlipidemia Sister     Obesity Sister     Hypertension Brother     Diabetes No family hx of     Cerebrovascular Disease No family hx of     Breast Cancer No family hx of      Social History     Socioeconomic History    Marital status: Single     Spouse name: Not on file    Number of children: Not on file    Years of education: Not on file    Highest education level: Not on file   Occupational History    Not on file   Tobacco Use    Smoking status: Never    Smokeless tobacco: Never   Vaping Use    Vaping Use: Never used   Substance and Sexual Activity    Alcohol use: Yes     Comment: very light use    Drug use: No    Sexual activity: Never     Birth control/protection: None   Other Topics Concern    Parent/sibling w/ CABG, MI or angioplasty before 65F 55M? Yes     Comment: brother  of heart attack at age 53   Social History Narrative    Dairy/d 3-4 servings/d.     Caffeine 0-1 servings/d    Exercise 1-2 x week trying to increase    Sunscreen used - Yes,occ    Seatbelts used - Yes    Working smoke/CO detectors in the home - Yes    Guns stored in the home - No    Self Breast Exams - No    Self Testicular Exam - NA    Eye Exam up to date - Yes     Dental Exam up to date - Yes     Pap Smear up to date - no    Mammogram up to date - has appt today    PSA up to date - NA    Dexa Scan up to date - 3/06    Flex Sig / Colonoscopy up to date - Yes,May 2008,repeat in 5yrs    Immunizations up to date - Yes,Td     Abuse: Current or Past(Physical, Sexual or Emotional)- No    Do you feel safe in your environment - Yes     Social Determinants of Health     Financial Resource Strain: Not on file   Food Insecurity: Not on file   Transportation Needs: Not on file   Physical Activity: Not on file   Stress: Not on file   Social Connections: Not on file   Intimate Partner Violence: Not on file   Housing Stability: Not on file       50 minutes spent reviewing chart, reviewing test results, talking with and examining patient,  formulating plan, and documentation on the day of the encounter.    Abdiel Torres MD  Pulmonary and Critical Care Medicine  [facility]

## 2023-08-03 NOTE — TELEPHONE ENCOUNTER
[facility] Call Center    Phone Message    May a detailed message be left on voicemail: yes     Reason for Call: Other: patient calling asking for explanation for her methacholine challenge test and if there is any prep she needs to do, please advise     Action Taken: Other: Pulm    Travel Screening: Not Applicable

## 2023-08-04 ENCOUNTER — ALLIED HEALTH/NURSE VISIT (OUTPATIENT)
Dept: PULMONOLOGY | Facility: CLINIC | Age: 73
End: 2023-08-04
Payer: COMMERCIAL

## 2023-08-04 ENCOUNTER — OFFICE VISIT (OUTPATIENT)
Dept: PULMONOLOGY | Facility: CLINIC | Age: 73
End: 2023-08-04
Payer: COMMERCIAL

## 2023-08-04 VITALS
HEIGHT: 66 IN | HEART RATE: 74 BPM | OXYGEN SATURATION: 95 % | BODY MASS INDEX: 42.91 KG/M2 | DIASTOLIC BLOOD PRESSURE: 79 MMHG | SYSTOLIC BLOOD PRESSURE: 163 MMHG | WEIGHT: 267 LBS

## 2023-08-04 DIAGNOSIS — R93.89 ABNORMAL CHEST X-RAY: ICD-10-CM

## 2023-08-04 DIAGNOSIS — J84.10 PULMONARY FIBROSIS (H): Primary | ICD-10-CM

## 2023-08-04 PROCEDURE — 94727 GAS DIL/WSHOT DETER LNG VOL: CPT | Performed by: INTERNAL MEDICINE

## 2023-08-04 PROCEDURE — 94375 RESPIRATORY FLOW VOLUME LOOP: CPT | Performed by: INTERNAL MEDICINE

## 2023-08-04 PROCEDURE — 94729 DIFFUSING CAPACITY: CPT | Performed by: INTERNAL MEDICINE

## 2023-08-04 PROCEDURE — 99204 OFFICE O/P NEW MOD 45 MIN: CPT | Mod: 25 | Performed by: STUDENT IN AN ORGANIZED HEALTH CARE EDUCATION/TRAINING PROGRAM

## 2023-08-04 RX ORDER — ALBUTEROL SULFATE 90 UG/1
2 AEROSOL, METERED RESPIRATORY (INHALATION) EVERY 6 HOURS PRN
Qty: 18 G | Refills: 3 | Status: SHIPPED | OUTPATIENT
Start: 2023-08-04

## 2023-08-04 NOTE — LETTER
8/4/2023         RE: Rhonda Trotter  [address]  [place] [address]        Dear Colleague,    Thank you for referring your patient, Rhonda Trotter, to the [facility] [place]. Please see a copy of my visit note below.    Pulmonary Clinic Note    Date of Service: 8/4/2023     Chief Complaint   Patient presents with    Consult       A/P:  73F HTN, HLD, hypothyroidism being seen for abnormal chest imaging. CT imaging in April w/ mild to moderate peripheral and basilar predominant fibrosis. PFT testing today w/ moderate restriction and moderate reduction in DLCOunc. She has no known underlying condition that would predispose her to pulmonary fibrosis, but further evaluation required. Her sister did have pulmonary fibrosis and pulmonary fibrosis can be familial, no specific work-up required for this. Her sister did have known underlying connective tissue disease that would predispose her to fibrosis. We will do additional testing today for interstitial lung disease and then monitor pulmonary function prior to consideration of antifibrotics or other disease specific treatment.     - send ILD panel  - 6MWT  - repeat PFTs in 6 months  - OK to substitute medications based on formulary     History:  73F HTN, HLD, hypothyroidism being seen for abnormal chest imaging. Had chest imaging after a bout with pneumonia in April. Currently, she denies SOB. Some KHAN w/ moderate exertion, but she attributes this to obesity. She has been trying to walk and exercise more and exercise capacity is improving. Checks SpO2, has seen as low as ~85%, but recovers quickly. No chest pain or tightness. No wheezing. Did have wheezing w/ pneumonia. Does have AM cough, she attributes to post-nasal gtt. No new joint aches or rashes. She denies autoimmune disease. No personal hx of pulmonary disease. She has previously had albuterol when she was sick    Sister had pulmonary fibrosis and history  "of CTD.     Smoking: never  Hot tub exposure: no       Bird exposure: Parakeet as a child, otherwise no             Animal exposure: no        Inhalation exposure: no    Occupation: retired, former office work                          10 point review of systems negative, aside from that mentioned in HPI.    BP (!) 163/79   Pulse 74   Ht 1.676 m (5' 6\")   Wt 121.1 kg (267 lb)   LMP 08/01/2004   SpO2 95%   BMI 43.09 kg/m    Gen: well-appearing  HEENT: anicteric  Card: RRR  Pulm: clear bilaterally   Abd: soft  MSK: no edema, no acute joint abnormality   Skin: no obvious rash  Psych: normal affect  Neuro: alert and oriented     Labs:  Personally reviewed    Imaging/Studies: Personally reviewed  PFTs (8/2023) - moderate restriction, moderate reduction in DLCOunc  CT Chest (4/2023) - mild-moderate peripheral and basilar fibrosis     Past Medical History:   Diagnosis Date    Arthritis     Obesity, unspecified     Pure hypercholesterolemia     Unspecified essential hypertension     Unspecified hypothyroidism      Past Surgical History:   Procedure Laterality Date    ARTHROPLASTY KNEE Left 2/3/2016    Procedure: ARTHROPLASTY KNEE;  Surgeon: Abdiel Ledesma MD;  Location:  OR    ARTHROPLASTY KNEE Right 10/11/2016    Procedure: ARTHROPLASTY KNEE;  Surgeon: Abdiel Ledesma MD;  Location:  OR    BIOPSY  August 2014    breast biopsy was negative    COLONOSCOPY  2013    every 5 years starting at age 52    orif Left     wrist    SURGICAL HISTORY OF -       wisdom teeth extraction     Family History   Problem Relation Age of Onset    C.A.D. Father         MI's x 2    Hypertension Father     Hyperlipidemia Father     Prostate Cancer Father     Cancer - colorectal Mother         diagnosed age 55    Heart Disease Mother         mi    Colon Cancer Mother     Obesity Mother     Circulatory Sister         aortic stenosis; Raynauds    Lipids Sister         on Lipitor    Respiratory Sister         pulmonary fibrosis    " Hypertension Sister         lost wt and off bp meds    C.A.D. Brother          of MI    Heart Disease Brother 53        mi    Hypertension Brother     Respiratory Paternal Uncle         Tb    Heart Disease Paternal Uncle     Gynecology Daughter     Hypertension Sister     Hyperlipidemia Sister     Obesity Sister     Hypertension Brother     Diabetes No family hx of     Cerebrovascular Disease No family hx of     Breast Cancer No family hx of      Social History     Socioeconomic History    Marital status: Single     Spouse name: Not on file    Number of children: Not on file    Years of education: Not on file    Highest education level: Not on file   Occupational History    Not on file   Tobacco Use    Smoking status: Never    Smokeless tobacco: Never   Vaping Use    Vaping Use: Never used   Substance and Sexual Activity    Alcohol use: Yes     Comment: very light use    Drug use: No    Sexual activity: Never     Birth control/protection: None   Other Topics Concern    Parent/sibling w/ CABG, MI or angioplasty before 65F 55M? Yes     Comment: brother  of heart attack at age 53   Social History Narrative    Dairy/d 3-4 servings/d.     Caffeine 0-1 servings/d    Exercise 1-2 x week trying to increase    Sunscreen used - Yes,occ    Seatbelts used - Yes    Working smoke/CO detectors in the home - Yes    Guns stored in the home - No    Self Breast Exams - No    Self Testicular Exam - NA    Eye Exam up to date - Yes     Dental Exam up to date - Yes     Pap Smear up to date - no    Mammogram up to date - has appt today    PSA up to date - NA    Dexa Scan up to date - 3/06    Flex Sig / Colonoscopy up to date - Yes,May 2008,repeat in 5yrs    Immunizations up to date - Yes,Td     Abuse: Current or Past(Physical, Sexual or Emotional)- No    Do you feel safe in your environment - Yes     Social Determinants of Health     Financial Resource Strain: Not on file   Food Insecurity: Not on file   Transportation Needs:  Not on file   Physical Activity: Not on file   Stress: Not on file   Social Connections: Not on file   Intimate Partner Violence: Not on file   Housing Stability: Not on file       50 minutes spent reviewing chart, reviewing test results, talking with and examining patient, formulating plan, and documentation on the day of the encounter.    Abdiel Torres MD  Pulmonary and Critical Care Medicine  [facility]

## 2023-08-04 NOTE — PROGRESS NOTES
PFTs completed (FVC, SVC, DLCO, and TLC) as ordered by Dr. Torres. Patient left the lab in no distress. -DD

## 2023-08-04 NOTE — PATIENT INSTRUCTIONS
Thank you for coming to pulmonary clinic. Your pulmonary function tests moderate restriction consistent with fibrosis and obesity. Your chest imaging shows mild fibrosis. I would like you to get some blood work done to evaluate for possible underlying causes. I would like you to do a walk test to see what your oxygen levels are. I will see you back in 6 months with repeat pulmonary function tests.     
Rhino Rockryanne

## 2023-08-07 ENCOUNTER — LAB (OUTPATIENT)
Dept: LAB | Facility: CLINIC | Age: 73
End: 2023-08-07
Payer: COMMERCIAL

## 2023-08-07 DIAGNOSIS — J84.10 PULMONARY FIBROSIS (H): ICD-10-CM

## 2023-08-07 LAB
CK SERPL-CCNC: 44 U/L (ref 26–192)
CRP SERPL-MCNC: 3.93 MG/L
ERYTHROCYTE [SEDIMENTATION RATE] IN BLOOD BY WESTERGREN METHOD: 21 MM/HR (ref 0–30)

## 2023-08-07 PROCEDURE — 99000 SPECIMEN HANDLING OFFICE-LAB: CPT

## 2023-08-07 PROCEDURE — 86200 CCP ANTIBODY: CPT

## 2023-08-07 PROCEDURE — 82787 IGG 1 2 3 OR 4 EACH: CPT

## 2023-08-07 PROCEDURE — 86331 IMMUNODIFFUSION OUCHTERLONY: CPT | Mod: 90

## 2023-08-07 PROCEDURE — 86038 ANTINUCLEAR ANTIBODIES: CPT

## 2023-08-07 PROCEDURE — 86036 ANCA SCREEN EACH ANTIBODY: CPT

## 2023-08-07 PROCEDURE — 82085 ASSAY OF ALDOLASE: CPT | Mod: 90

## 2023-08-07 PROCEDURE — 86140 C-REACTIVE PROTEIN: CPT

## 2023-08-07 PROCEDURE — 86037 ANCA TITER EACH ANTIBODY: CPT

## 2023-08-07 PROCEDURE — 36415 COLL VENOUS BLD VENIPUNCTURE: CPT

## 2023-08-07 PROCEDURE — 82550 ASSAY OF CK (CPK): CPT

## 2023-08-07 PROCEDURE — 86606 ASPERGILLUS ANTIBODY: CPT | Mod: 90

## 2023-08-07 PROCEDURE — 86431 RHEUMATOID FACTOR QUANT: CPT

## 2023-08-07 PROCEDURE — 86235 NUCLEAR ANTIGEN ANTIBODY: CPT

## 2023-08-07 PROCEDURE — 85652 RBC SED RATE AUTOMATED: CPT

## 2023-08-07 PROCEDURE — 82784 ASSAY IGA/IGD/IGG/IGM EACH: CPT

## 2023-08-08 LAB
ALDOLASE SERPL-CCNC: 3.5 U/L
CCP AB SER IA-ACNC: 1.8 U/ML
ENA JO1 AB SER IA-ACNC: <0.5 U/ML
ENA JO1 IGG SER-ACNC: NEGATIVE
ENA SCL70 IGG SER IA-ACNC: 1.2 U/ML
ENA SCL70 IGG SER IA-ACNC: NEGATIVE
ENA SS-A AB SER IA-ACNC: 0.8 U/ML
ENA SS-A AB SER IA-ACNC: NEGATIVE
ENA SS-B IGG SER IA-ACNC: 0.6 U/ML
ENA SS-B IGG SER IA-ACNC: NEGATIVE
IGG SERPL-MCNC: 1450 MG/DL (ref 610–1616)
IGG1 SER-MCNC: 680 MG/DL (ref 382–929)
IGG2 SER-MCNC: 501 MG/DL (ref 242–700)
IGG3 SER-MCNC: 109 MG/DL (ref 22–176)
IGG4 SER-MCNC: 121 MG/DL (ref 4–86)
RHEUMATOID FACT SER NEPH-ACNC: 6 IU/ML
SUBCLASSES, PERCENT: 97 %

## 2023-08-09 LAB
ANA SER QL IF: NEGATIVE
DLCOUNC-%PRED-PRE: 61 %
DLCOUNC-PRE: 12.28 ML/MIN/MMHG
DLCOUNC-PRED: 19.88 ML/MIN/MMHG
ERV-%PRED-PRE: 23 %
ERV-PRE: 0.18 L
ERV-PRED: 0.78 L
EXPTIME-PRE: 3.77 SEC
FEF2575-%PRED-PRE: 152 %
FEF2575-PRE: 2.78 L/SEC
FEF2575-PRED: 1.82 L/SEC
FEFMAX-%PRED-PRE: 94 %
FEFMAX-PRE: 5.57 L/SEC
FEFMAX-PRED: 5.88 L/SEC
FEV1-%PRED-PRE: 81 %
FEV1-PRE: 1.78 L
FEV1FEV6-PRE: 90 %
FEV1FEV6-PRED: 79 %
FEV1FVC-PRE: 90 %
FEV1FVC-PRED: 79 %
FEV1SVC-PRE: 90 %
FEV1SVC-PRED: 69 %
FIFMAX-PRE: 4.28 L/SEC
FRCPLETH-%PRED-PRE: 58 %
FRCPLETH-PRE: 1.78 L
FRCPLETH-PRED: 3.02 L
FVC-%PRED-PRE: 70 %
FVC-PRE: 1.97 L
FVC-PRED: 2.78 L
IC-%PRED-PRE: 77 %
IC-PRE: 1.8 L
IC-PRED: 2.33 L
RVPLETH-%PRED-PRE: 75 %
RVPLETH-PRE: 1.6 L
RVPLETH-PRED: 2.11 L
TLCPLETH-%PRED-PRE: 67 %
TLCPLETH-PRE: 3.58 L
TLCPLETH-PRED: 5.32 L
VA-%PRED-PRE: 62 %
VA-PRE: 3.03 L
VC-%PRED-PRE: 62 %
VC-PRE: 1.98 L
VC-PRED: 3.16 L

## 2023-08-11 LAB
ANCA AB PATTERN SER IF-IMP: ABNORMAL
C-ANCA TITR SER IF: ABNORMAL {TITER}

## 2023-08-14 LAB
A FLAVUS AB SER QL ID: NORMAL
A FUMIGATUS2 AB SER QL ID: NORMAL
A FUMIGATUS3 AB SER QL ID: NORMAL
S VIRIDIS AB SER QL ID: NORMAL
T CANDIDUS AB SER QL: NORMAL

## 2023-08-18 LAB
A FUMIGATUS1 AB SER QL ID: NORMAL
A FUMIGATUS6 AB SER QL ID: NORMAL
A PULLULANS AB SER QL ID: NORMAL
PIGEON SERUM AB QL ID: NORMAL
S RECTIVIRGULA AB SER QL ID: NORMAL

## 2023-08-22 ENCOUNTER — E-VISIT (OUTPATIENT)
Dept: URGENT CARE | Facility: CLINIC | Age: 73
End: 2023-08-22
Payer: COMMERCIAL

## 2023-08-22 DIAGNOSIS — N39.0 ACUTE UTI (URINARY TRACT INFECTION): Primary | ICD-10-CM

## 2023-08-22 PROCEDURE — 99207 PR NON-BILLABLE SERV PER CHARTING: CPT | Performed by: EMERGENCY MEDICINE

## 2023-08-22 RX ORDER — NITROFURANTOIN 25; 75 MG/1; MG/1
100 CAPSULE ORAL 2 TIMES DAILY
Qty: 10 CAPSULE | Refills: 0 | Status: SHIPPED | OUTPATIENT
Start: 2023-08-22 | End: 2023-08-27

## 2023-08-22 NOTE — PATIENT INSTRUCTIONS
Dear Rhonda Trotter    After reviewing your responses, I've been able to diagnose you with a urinary tract infection, which is a common infection of the bladder with bacteria.  This is not a sexually transmitted infection, though urinating immediately after intercourse can help prevent infections.  Drinking lots of fluids is also helpful to clear your current infection and prevent the next one.      I have sent a prescription for antibiotics to your pharmacy to treat this infection.    It is important that you take all of your prescribed medication even if your symptoms are improving after a few doses.  Taking all of your medicine helps prevent the symptoms from returning.     If your symptoms worsen, you develop pain in your back or stomach, develop fevers, or are not improving in 5 days, please contact your primary care provider for an appointment or visit any of our convenient Walk-in or Urgent Care Centers to be seen, which can be found on our website here.    Thanks again for choosing us as your health care partner,    Ren Dickens MD

## 2023-08-23 ENCOUNTER — TELEPHONE (OUTPATIENT)
Dept: PULMONOLOGY | Facility: CLINIC | Age: 73
End: 2023-08-23
Payer: COMMERCIAL

## 2023-08-23 NOTE — TELEPHONE ENCOUNTER
Patient called requesting information on 6MWT test. Patient wanted to know what to expect with 6MWT. Writer explained 6MWT to patient and updated patient on what to expect and what the test entails. All questions answered to patient's satisfaction. Patient has no further questions at this time.    Jaleel Leyva RN

## 2023-09-05 ENCOUNTER — OFFICE VISIT (OUTPATIENT)
Dept: PULMONOLOGY | Facility: CLINIC | Age: 73
End: 2023-09-05
Payer: COMMERCIAL

## 2023-09-05 DIAGNOSIS — J84.10 PULMONARY FIBROSIS (H): ICD-10-CM

## 2023-09-05 LAB
6 MIN WALK (FT): 200 FT
6 MIN WALK (M): 61 M

## 2023-09-05 PROCEDURE — 94618 PULMONARY STRESS TESTING: CPT | Performed by: INTERNAL MEDICINE

## 2023-09-05 NOTE — PROGRESS NOTES
Rhonda Trotter was seen in clinic for 6 minute walk test ordered by Dr. Torres. During testing, patient started feeling lightheaded and walk was stopped. Patient is fall risk and further testing was deferred.     Vitals were checked by RT    -BP: 145/86   SAT: 96% on 2L NC, HR: 66.     Patient rested in PFT lab for 15 minutes and vitals were rechecked.       -BP: 130/83  SAT: 95% on RA, HR: 68.     Patient was made aware by RT that she can stay and rest in lab until feeling comfortable to leave. Patient stated she was okay to leave.     Patient left in no distress.       Mikey Ruiz, RT on 9/5/2023 at 2:36 PM

## 2023-09-05 NOTE — PROGRESS NOTES
Rhonda Trotter comes into clinic today at the request of Dr. Torres, Ordering Provider for 6 minute walk      This service provided today was under the supervising provider of the day Dr. Beatty, who was available if needed.    Mikey Ruiz, RT

## 2023-09-09 LAB — FIO2-PRE: 0.28 %

## 2023-09-29 ENCOUNTER — OFFICE VISIT (OUTPATIENT)
Dept: PULMONOLOGY | Facility: CLINIC | Age: 73
End: 2023-09-29
Payer: COMMERCIAL

## 2023-09-29 ENCOUNTER — TELEPHONE (OUTPATIENT)
Dept: PULMONOLOGY | Facility: CLINIC | Age: 73
End: 2023-09-29

## 2023-09-29 VITALS
HEART RATE: 68 BPM | WEIGHT: 261 LBS | SYSTOLIC BLOOD PRESSURE: 134 MMHG | DIASTOLIC BLOOD PRESSURE: 83 MMHG | BODY MASS INDEX: 42.13 KG/M2 | OXYGEN SATURATION: 97 % | RESPIRATION RATE: 18 BRPM

## 2023-09-29 DIAGNOSIS — J84.10 PULMONARY FIBROSIS (H): Primary | ICD-10-CM

## 2023-09-29 PROCEDURE — 99214 OFFICE O/P EST MOD 30 MIN: CPT | Performed by: STUDENT IN AN ORGANIZED HEALTH CARE EDUCATION/TRAINING PROGRAM

## 2023-09-29 NOTE — LETTER
9/29/2023         RE: Rhonda Trotter  [address]  [place] [address]        Dear Colleague,    Thank you for referring your patient, Rhonda Trotter, to the [facility] [place]. Please see a copy of my visit note below.    Pulmonary Clinic Note    Date of Service: 9/29/2023     Chief Complaint   Patient presents with    Follow Up     Pulmonary fibrosis       A/P:  73F HTN, HLD, hypothyroidism being seen for follow up pulmonary fibrosis. ILD panel previously sent and only positive for mildly positive ANCA w/o other abnormalities. Seen today for O2 certification.     - start 2L O2 w/ activity  - trial prn albuterol  - OK to substitute medications based on formulary     History:  73F HTN, HLD, hypothyroidism being seen for follow up pulmonary fibrosis. She was last seen 8/2023. She is here for O2 face to face. She had 6MWT done showing hypoxemia on 2L. She is prescribed prn albuterol. She has not tried the albuterol yet. No SOB at rest. KHAN w/ moderate-strenuous activity. Minimal cough. No orthopnea or PND. Checks SpO2 at home, lowest ~85%. Recovers quickly with rest. The majority of today's visit was spent answering question patient brought in.     Sister had pulmonary fibrosis and history of CTD.      Smoking: never  Hot tub exposure: no       Bird exposure: Parakeet as a child, otherwise no             Animal exposure: no        Inhalation exposure: no    Occupation: retired, former office work                          10 point review of systems negative, aside from that mentioned in HPI.    /83 (BP Location: Left arm, Patient Position: Sitting, Cuff Size: Adult Large)   Pulse 68   Resp 18   Wt 118.4 kg (261 lb)   LMP 08/01/2004   SpO2 97%   BMI 42.13 kg/m    Gen: well-appearing  HEENT: anicteric  Card: RRR  Pulm: clear bilaterally   Abd: soft  MSK: no edema, no acute joint abnormality   Skin: no obvious rash  Psych: normal affect  Neuro: alert and  oriented     Labs:  Personally reviewed  ANCA (2023) - 1:40    Imaging/Studies: Personally reviewed  6MWT (2023) - hypoxemia w/ ambulation on RA, reduced distance, significant desaturion w/o hypoxemia on 2L   PFTs (2023) - moderate restriction, moderate reduction in DLCOunc  CT Chest (2023) - mild-moderate peripheral and basilar fibrosis    Past Medical History:   Diagnosis Date    Arthritis     Obesity, unspecified     Pure hypercholesterolemia     Unspecified essential hypertension     Unspecified hypothyroidism      Past Surgical History:   Procedure Laterality Date    ARTHROPLASTY KNEE Left 2/3/2016    Procedure: ARTHROPLASTY KNEE;  Surgeon: Abdiel Ledesma MD;  Location:  OR    ARTHROPLASTY KNEE Right 10/11/2016    Procedure: ARTHROPLASTY KNEE;  Surgeon: Abdiel Ledesma MD;  Location:  OR    BIOPSY  2014    breast biopsy was negative    COLONOSCOPY      every 5 years starting at age 52    orif Left     wrist    SURGICAL HISTORY OF -       wisdom teeth extraction     Family History   Problem Relation Age of Onset    C.A.D. Father         MI's x 2    Hypertension Father     Hyperlipidemia Father     Prostate Cancer Father     Cancer - colorectal Mother         diagnosed age 55    Heart Disease Mother         mi    Colon Cancer Mother     Obesity Mother     Circulatory Sister         aortic stenosis; Raynauds    Lipids Sister         on Lipitor    Respiratory Sister         pulmonary fibrosis    Hypertension Sister         lost wt and off bp meds    C.A.D. Brother          of MI    Heart Disease Brother 53        mi    Hypertension Brother     Respiratory Paternal Uncle         Tb    Heart Disease Paternal Uncle     Gynecology Daughter     Hypertension Sister     Hyperlipidemia Sister     Obesity Sister     Hypertension Brother     Diabetes No family hx of     Cerebrovascular Disease No family hx of     Breast Cancer No family hx of      Social History     Socioeconomic History     Marital status: Single     Spouse name: Not on file    Number of children: Not on file    Years of education: Not on file    Highest education level: Not on file   Occupational History    Not on file   Tobacco Use    Smoking status: Never    Smokeless tobacco: Never   Vaping Use    Vaping Use: Never used   Substance and Sexual Activity    Alcohol use: Yes     Comment: very light use    Drug use: No    Sexual activity: Never     Birth control/protection: None   Other Topics Concern    Parent/sibling w/ CABG, MI or angioplasty before 65F 55M? Yes     Comment: brother  of heart attack at age 53   Social History Narrative    Dairy/d 3-4 servings/d.     Caffeine 0-1 servings/d    Exercise 1-2 x week trying to increase    Sunscreen used - Yes,occ    Seatbelts used - Yes    Working smoke/CO detectors in the home - Yes    Guns stored in the home - No    Self Breast Exams - No    Self Testicular Exam - NA    Eye Exam up to date - Yes     Dental Exam up to date - Yes     Pap Smear up to date - no    Mammogram up to date - has appt today    PSA up to date - NA    Dexa Scan up to date - 3/06    Flex Sig / Colonoscopy up to date - Yes,May 2008,repeat in 5yrs    Immunizations up to date - Yes,Td     Abuse: Current or Past(Physical, Sexual or Emotional)- No    Do you feel safe in your environment - Yes     Social Determinants of Health     Financial Resource Strain: Not on file   Food Insecurity: Not on file   Transportation Needs: Not on file   Physical Activity: Not on file   Stress: Not on file   Social Connections: Not on file   Interpersonal Safety: Not on file   Housing Stability: Not on file       35 minutes spent reviewing chart, reviewing test results, talking with and examining patient, formulating plan, and documentation on the day of the encounter.    I certify that this patient, Rhonda Trotter has been under my care (or a nurse practitioner or physican's assistant working with me). This is the  face-to-face encounter for oxygen medical necessity.      At the time of this encounter supplemental oxygen is reasonable and necessary and is expected to improve the patient's condition in a home setting.       Patient has continued oxygen desaturation due to Pulmonary Fibrosis J84.10.    If portability is ordered, is the patient mobile within the home? yes          Again, thank you for allowing me to participate in the care of your patient.      Sincerely,    Abdiel Torres MD

## 2023-09-29 NOTE — TELEPHONE ENCOUNTER
Faxed Home 02 order, Face to Face, 6MWT and Facesheet to  DME to set up home oxygen for patient.     Jaleel Leyva RN

## 2023-09-29 NOTE — PATIENT INSTRUCTIONS
Try albuterol as needed for worsening shortness of breath or prior to activity. Use 2L oxygen with activity. I have referred you to pulmonary rehab.

## 2023-09-29 NOTE — PROGRESS NOTES
Pulmonary Clinic Note    Date of Service: 9/29/2023     Chief Complaint   Patient presents with    Follow Up     Pulmonary fibrosis       A/P:  73F HTN, HLD, hypothyroidism being seen for follow up pulmonary fibrosis. ILD panel previously sent and only positive for mildly positive ANCA w/o other abnormalities. Seen today for O2 certification.     - start 2L O2 w/ activity  - trial prn albuterol  - from a pulmonary perspective, she is OK for colonoscopy, anesthesia plan per the discretion of the managing providers  - OK to substitute medications based on formulary     History:  73F HTN, HLD, hypothyroidism being seen for follow up pulmonary fibrosis. She was last seen 8/2023. She is here for O2 face to face. She had 6MWT done showing hypoxemia on 2L. She is prescribed prn albuterol. She has not tried the albuterol yet. No SOB at rest. KHAN w/ moderate-strenuous activity. Minimal cough. No orthopnea or PND. Checks SpO2 at home, lowest ~85%. Recovers quickly with rest. The majority of today's visit was spent answering question patient brought in.     Sister had pulmonary fibrosis and history of CTD.      Smoking: never  Hot tub exposure: no       Bird exposure: Parakeet as a child, otherwise no             Animal exposure: no        Inhalation exposure: no    Occupation: retired, former office work                          10 point review of systems negative, aside from that mentioned in HPI.    /83 (BP Location: Left arm, Patient Position: Sitting, Cuff Size: Adult Large)   Pulse 68   Resp 18   Wt 118.4 kg (261 lb)   LMP 08/01/2004   SpO2 97%   BMI 42.13 kg/m    Gen: well-appearing  HEENT: anicteric  Card: RRR  Pulm: clear bilaterally   Abd: soft  MSK: no edema, no acute joint abnormality   Skin: no obvious rash  Psych: normal affect  Neuro: alert and oriented     Labs:  Personally reviewed  ANCA (8/2023) - 1:40    Imaging/Studies: Personally reviewed  6MWT (9/2023) - hypoxemia w/ ambulation on RA, reduced  distance, significant desaturion w/o hypoxemia on 2L   PFTs (2023) - moderate restriction, moderate reduction in DLCOunc  CT Chest (2023) - mild-moderate peripheral and basilar fibrosis    Past Medical History:   Diagnosis Date    Arthritis     Obesity, unspecified     Pure hypercholesterolemia     Unspecified essential hypertension     Unspecified hypothyroidism      Past Surgical History:   Procedure Laterality Date    ARTHROPLASTY KNEE Left 2/3/2016    Procedure: ARTHROPLASTY KNEE;  Surgeon: Abdiel Ledesma MD;  Location: [facility] OR    ARTHROPLASTY KNEE Right 10/11/2016    Procedure: ARTHROPLASTY KNEE;  Surgeon: Abdiel Ledesma MD;  Location:  OR    BIOPSY  2014    breast biopsy was negative    COLONOSCOPY  2013    every 5 years starting at age 52    orif Left     wrist    SURGICAL HISTORY OF -       wisdom teeth extraction     Family History   Problem Relation Age of Onset    C.A.D. Father         MI's x 2    Hypertension Father     Hyperlipidemia Father     Prostate Cancer Father     Cancer - colorectal Mother         diagnosed age 55    Heart Disease Mother         mi    Colon Cancer Mother     Obesity Mother     Circulatory Sister         aortic stenosis; Raynauds    Lipids Sister         on Lipitor    Respiratory Sister         pulmonary fibrosis    Hypertension Sister         lost wt and off bp meds    C.A.D. Brother          of MI    Heart Disease Brother 53        mi    Hypertension Brother     Respiratory Paternal Uncle         Tb    Heart Disease Paternal Uncle     Gynecology Daughter     Hypertension Sister     Hyperlipidemia Sister     Obesity Sister     Hypertension Brother     Diabetes No family hx of     Cerebrovascular Disease No family hx of     Breast Cancer No family hx of      Social History     Socioeconomic History    Marital status: Single     Spouse name: Not on file    Number of children: Not on file    Years of education: Not on file    Highest education level: Not  on file   Occupational History    Not on file   Tobacco Use    Smoking status: Never    Smokeless tobacco: Never   Vaping Use    Vaping Use: Never used   Substance and Sexual Activity    Alcohol use: Yes     Comment: very light use    Drug use: No    Sexual activity: Never     Birth control/protection: None   Other Topics Concern    Parent/sibling w/ CABG, MI or angioplasty before 65F 55M? Yes     Comment: brother  of heart attack at age 53   Social History Narrative    Dairy/d 3-4 servings/d.     Caffeine 0-1 servings/d    Exercise 1-2 x week trying to increase    Sunscreen used - Yes,occ    Seatbelts used - Yes    Working smoke/CO detectors in the home - Yes    Guns stored in the home - No    Self Breast Exams - No    Self Testicular Exam - NA    Eye Exam up to date - Yes     Dental Exam up to date - Yes     Pap Smear up to date - no    Mammogram up to date - has appt today    PSA up to date - NA    Dexa Scan up to date - 3/06    Flex Sig / Colonoscopy up to date - Yes,May 2008,repeat in 5yrs    Immunizations up to date - Yes,2001    Abuse: Current or Past(Physical, Sexual or Emotional)- No    Do you feel safe in your environment - Yes     Social Determinants of Health     Financial Resource Strain: Not on file   Food Insecurity: Not on file   Transportation Needs: Not on file   Physical Activity: Not on file   Stress: Not on file   Social Connections: Not on file   Interpersonal Safety: Not on file   Housing Stability: Not on file       35 minutes spent reviewing chart, reviewing test results, talking with and examining patient, formulating plan, and documentation on the day of the encounter.    Abdiel Torres MD  Pulmonary and Critical Care Medicine  [facility]     I certify that this patient, Rhonda Trotter has been under my care (or a nurse practitioner or physican's assistant working with me). This is the face-to-face encounter for oxygen medical necessity.      At the time of this  encounter supplemental oxygen is reasonable and necessary and is expected to improve the patient's condition in a home setting.       Patient has continued oxygen desaturation due to Pulmonary Fibrosis J84.10.    If portability is ordered, is the patient mobile within the home? yes

## 2023-10-12 ENCOUNTER — E-VISIT (OUTPATIENT)
Dept: URGENT CARE | Facility: CLINIC | Age: 73
End: 2023-10-12
Payer: COMMERCIAL

## 2023-10-12 DIAGNOSIS — N30.00 ACUTE CYSTITIS WITHOUT HEMATURIA: Primary | ICD-10-CM

## 2023-10-12 DIAGNOSIS — R35.0 URINARY FREQUENCY: ICD-10-CM

## 2023-10-12 PROCEDURE — 99421 OL DIG E/M SVC 5-10 MIN: CPT | Performed by: PREVENTIVE MEDICINE

## 2023-10-12 NOTE — PATIENT INSTRUCTIONS
Dear Rhonda Trotter,     After reviewing your responses, I would like you to come in for a urine test to make sure we treat you correctly. This urine test is to evaluate you for a possible urinary tract infection, and should be scheduled for today or tomorrow. Schedule a Lab Only appointment here.     Lab appointments are not available at most locations on the weekends. If no Lab Only appointment is available, you should be seen in any of our convenient Walk-in or Urgent Care Centers, which can be found on our website here.     You will receive instructions with your results in JetSuite once they are available.     If your symptoms worsen, you develop pain in your back or stomach, develop fevers, or are not improving in 5 days, please contact your primary care provider for an appointment or visit a Walk-in or Urgent Care Center to be seen.     Thanks again for choosing us as your health care partner,     Noel Roche MD, MD

## 2023-10-13 ENCOUNTER — LAB (OUTPATIENT)
Dept: LAB | Facility: CLINIC | Age: 73
End: 2023-10-13
Payer: COMMERCIAL

## 2023-10-13 DIAGNOSIS — R35.0 URINARY FREQUENCY: ICD-10-CM

## 2023-10-13 LAB
ALBUMIN UR-MCNC: ABNORMAL MG/DL
APPEARANCE UR: ABNORMAL
BACTERIA #/AREA URNS HPF: ABNORMAL /HPF
BILIRUB UR QL STRIP: NEGATIVE
COLOR UR AUTO: YELLOW
GLUCOSE UR STRIP-MCNC: NEGATIVE MG/DL
HGB UR QL STRIP: ABNORMAL
KETONES UR STRIP-MCNC: NEGATIVE MG/DL
LEUKOCYTE ESTERASE UR QL STRIP: ABNORMAL
NITRATE UR QL: NEGATIVE
PH UR STRIP: 7 [PH] (ref 5–7)
RBC #/AREA URNS AUTO: ABNORMAL /HPF
SP GR UR STRIP: 1.01 (ref 1–1.03)
SQUAMOUS #/AREA URNS AUTO: ABNORMAL /LPF
UROBILINOGEN UR STRIP-ACNC: 0.2 E.U./DL
WBC #/AREA URNS AUTO: >100 /HPF

## 2023-10-13 PROCEDURE — 81001 URINALYSIS AUTO W/SCOPE: CPT

## 2023-10-13 PROCEDURE — 87186 SC STD MICRODIL/AGAR DIL: CPT

## 2023-10-13 PROCEDURE — 87086 URINE CULTURE/COLONY COUNT: CPT

## 2023-10-15 LAB — BACTERIA UR CULT: ABNORMAL

## 2023-11-03 ENCOUNTER — E-VISIT (OUTPATIENT)
Dept: URGENT CARE | Facility: CLINIC | Age: 73
End: 2023-11-03
Payer: COMMERCIAL

## 2023-11-03 DIAGNOSIS — R30.0 DYSURIA: Primary | ICD-10-CM

## 2023-11-03 PROCEDURE — 99421 OL DIG E/M SVC 5-10 MIN: CPT | Performed by: PHYSICIAN ASSISTANT

## 2023-11-03 NOTE — PATIENT INSTRUCTIONS
Dear Rhonda Trotter,     After reviewing your responses, I would like you to come in for a urine test to make sure we treat you correctly. This urine test is to evaluate you for a possible urinary tract infection, and should be scheduled for today or tomorrow. Schedule a Lab Only appointment here.     Lab appointments are not available at most locations on the weekends. If no Lab Only appointment is available, you should be seen in any of our convenient Walk-in or Urgent Care Centers, which can be found on our website here.     You will receive instructions with your results in Cloze once they are available.     If your symptoms worsen, you develop pain in your back or stomach, develop fevers, or are not improving in 5 days, please contact your primary care provider for an appointment or visit a Walk-in or Urgent Care Center to be seen.     Thanks again for choosing us as your health care partner,     Andree Antonio PA-C

## 2023-11-04 ENCOUNTER — LAB (OUTPATIENT)
Dept: LAB | Facility: CLINIC | Age: 73
End: 2023-11-04
Payer: COMMERCIAL

## 2023-11-04 DIAGNOSIS — N39.0 ACUTE UTI: Primary | ICD-10-CM

## 2023-11-04 DIAGNOSIS — I10 BENIGN ESSENTIAL HYPERTENSION: Primary | ICD-10-CM

## 2023-11-04 DIAGNOSIS — R30.0 DYSURIA: ICD-10-CM

## 2023-11-04 DIAGNOSIS — E03.9 HYPOTHYROIDISM: ICD-10-CM

## 2023-11-04 LAB
ALBUMIN UR-MCNC: 30 MG/DL
APPEARANCE UR: ABNORMAL
BACTERIA #/AREA URNS HPF: ABNORMAL /HPF
BILIRUB UR QL STRIP: NEGATIVE
COLOR UR AUTO: YELLOW
GLUCOSE UR STRIP-MCNC: NEGATIVE MG/DL
HGB UR QL STRIP: ABNORMAL
KETONES UR STRIP-MCNC: NEGATIVE MG/DL
LEUKOCYTE ESTERASE UR QL STRIP: ABNORMAL
NITRATE UR QL: NEGATIVE
PH UR STRIP: 7.5 [PH] (ref 5–7)
RBC #/AREA URNS AUTO: ABNORMAL /HPF
SP GR UR STRIP: 1.01 (ref 1–1.03)
SQUAMOUS #/AREA URNS AUTO: ABNORMAL /LPF
UROBILINOGEN UR STRIP-ACNC: 0.2 E.U./DL
WBC #/AREA URNS AUTO: >100 /HPF
WBC CLUMPS #/AREA URNS HPF: PRESENT /HPF

## 2023-11-04 PROCEDURE — 87086 URINE CULTURE/COLONY COUNT: CPT

## 2023-11-04 PROCEDURE — 81001 URINALYSIS AUTO W/SCOPE: CPT

## 2023-11-04 PROCEDURE — 87186 SC STD MICRODIL/AGAR DIL: CPT

## 2023-11-04 RX ORDER — SULFAMETHOXAZOLE/TRIMETHOPRIM 800-160 MG
1 TABLET ORAL 2 TIMES DAILY
Qty: 6 TABLET | Refills: 0 | Status: SHIPPED | OUTPATIENT
Start: 2023-11-04 | End: 2023-11-07

## 2023-11-05 LAB — BACTERIA UR CULT: ABNORMAL

## 2023-11-09 ENCOUNTER — MYC MEDICAL ADVICE (OUTPATIENT)
Dept: FAMILY MEDICINE | Facility: CLINIC | Age: 73
End: 2023-11-09
Payer: COMMERCIAL

## 2023-11-09 ENCOUNTER — MYC REFILL (OUTPATIENT)
Dept: FAMILY MEDICINE | Facility: CLINIC | Age: 73
End: 2023-11-09
Payer: COMMERCIAL

## 2023-11-09 DIAGNOSIS — E78.5 HYPERLIPIDEMIA LDL GOAL <130: ICD-10-CM

## 2023-11-09 DIAGNOSIS — I10 BENIGN ESSENTIAL HYPERTENSION: ICD-10-CM

## 2023-11-09 DIAGNOSIS — E03.9 HYPOTHYROIDISM, UNSPECIFIED TYPE: ICD-10-CM

## 2023-11-09 DIAGNOSIS — M81.0 OSTEOPOROSIS, UNSPECIFIED OSTEOPOROSIS TYPE, UNSPECIFIED PATHOLOGICAL FRACTURE PRESENCE: ICD-10-CM

## 2023-11-09 RX ORDER — SIMVASTATIN 20 MG
20 TABLET ORAL AT BEDTIME
Qty: 90 TABLET | Refills: 0 | Status: SHIPPED | OUTPATIENT
Start: 2023-11-09 | End: 2023-12-06

## 2023-11-09 RX ORDER — ALENDRONATE SODIUM 70 MG/1
TABLET ORAL
Qty: 12 TABLET | Refills: 0 | Status: SHIPPED | OUTPATIENT
Start: 2023-11-09 | End: 2024-02-09

## 2023-11-09 RX ORDER — LEVOTHYROXINE SODIUM 100 UG/1
100 TABLET ORAL DAILY
Qty: 90 TABLET | Refills: 0 | Status: SHIPPED | OUTPATIENT
Start: 2023-11-09 | End: 2023-12-06

## 2023-11-09 RX ORDER — VALSARTAN 160 MG/1
160 TABLET ORAL DAILY
Qty: 90 TABLET | Refills: 0 | Status: SHIPPED | OUTPATIENT
Start: 2023-11-09 | End: 2023-12-06

## 2023-11-16 ENCOUNTER — HOSPITAL ENCOUNTER (OUTPATIENT)
Dept: CARDIAC REHAB | Facility: CLINIC | Age: 73
Discharge: HOME | End: 2023-11-16
Attending: STUDENT IN AN ORGANIZED HEALTH CARE EDUCATION/TRAINING PROGRAM
Payer: COMMERCIAL

## 2023-11-16 DIAGNOSIS — J84.10 PULMONARY FIBROSIS (H): ICD-10-CM

## 2023-11-16 PROCEDURE — G0238 OTH RESP PROC, INDIV: HCPCS | Performed by: CLINICAL EXERCISE PHYSIOLOGIST

## 2023-11-28 ENCOUNTER — E-VISIT (OUTPATIENT)
Dept: URGENT CARE | Facility: CLINIC | Age: 73
End: 2023-11-28
Payer: COMMERCIAL

## 2023-11-28 DIAGNOSIS — R35.0 URINARY FREQUENCY: Primary | ICD-10-CM

## 2023-11-28 PROCEDURE — 99421 OL DIG E/M SVC 5-10 MIN: CPT | Performed by: PREVENTIVE MEDICINE

## 2023-11-28 NOTE — PATIENT INSTRUCTIONS
Dear Rhonda Trotter,     After reviewing your responses, I would like you to come in for a urine test to make sure we treat you correctly. This urine test is to evaluate you for a possible urinary tract infection, and should be scheduled for today or tomorrow. Schedule a Lab Only appointment here.     Lab appointments are not available at most locations on the weekends. If no Lab Only appointment is available, you should be seen in any of our convenient Walk-in or Urgent Care Centers, which can be found on our website here.     You will receive instructions with your results in Atbrox once they are available.     If your symptoms worsen, you develop pain in your back or stomach, develop fevers, or are not improving in 5 days, please contact your primary care provider for an appointment or visit a Walk-in or Urgent Care Center to be seen.     Thanks again for choosing us as your health care partner,     Noel Roche MD, MD  Urinary Tract Infection (UTI) in Women: Care Instructions  Overview     A urinary tract infection, or UTI, is a general term for an infection anywhere between the kidneys and the urethra (where urine comes out). Most UTIs are bladder infections. They often cause pain or burning when you urinate.  UTIs are caused by bacteria and can be cured with antibiotics. Be sure to complete your treatment so that the infection does not get worse.  Follow-up care is a key part of your treatment and safety. Be sure to make and go to all appointments, and call your doctor if you are having problems. It's also a good idea to know your test results and keep a list of the medicines you take.  How can you care for yourself at home?  Take your antibiotics as directed. Do not stop taking them just because you feel better. You need to take the full course of antibiotics.  Drink extra water and other fluids for the next day or two. This will help make the urine less concentrated and help wash out  "the bacteria that are causing the infection. (If you have kidney, heart, or liver disease and have to limit fluids, talk with your doctor before you increase the amount of fluids you drink.)  Avoid drinks that are carbonated or have caffeine. They can irritate the bladder.  Urinate often. Try to empty your bladder each time.  To relieve pain, take a hot bath or lay a heating pad set on low over your lower belly or genital area. Never go to sleep with a heating pad in place.  To prevent UTIs  Drink plenty of water each day. This helps you urinate often, which clears bacteria from your system. (If you have kidney, heart, or liver disease and have to limit fluids, talk with your doctor before you increase the amount of fluids you drink.)  Urinate when you need to.  If you are sexually active, urinate right after you have sex.  Change sanitary pads often.  Avoid douches, bubble baths, feminine hygiene sprays, and other feminine hygiene products that have deodorants.  After going to the bathroom, wipe from front to back.  When should you call for help?   Call your doctor now or seek immediate medical care if:    Symptoms such as fever, chills, nausea, or vomiting get worse or appear for the first time.     You have new pain in your back just below your rib cage. This is called flank pain.     There is new blood or pus in your urine.     You have any problems with your antibiotic medicine.   Watch closely for changes in your health, and be sure to contact your doctor if:    You are not getting better after taking an antibiotic for 2 days.     Your symptoms go away but then come back.   Where can you learn more?  Go to https://www.FilmLoop.net/patiented  Enter K848 in the search box to learn more about \"Urinary Tract Infection (UTI) in Women: Care Instructions.\"  Current as of: February 28, 2023               Content Version: 13.8    0946-5657 [employer].   Care instructions adapted under license by your " healthcare professional. If you have questions about a medical condition or this instruction, always ask your healthcare professional. [employer] disclaims any warranty or liability for your use of this information.

## 2023-11-29 ENCOUNTER — LAB (OUTPATIENT)
Dept: LAB | Facility: CLINIC | Age: 73
End: 2023-11-29
Payer: COMMERCIAL

## 2023-11-29 DIAGNOSIS — N30.00 ACUTE CYSTITIS WITHOUT HEMATURIA: Primary | ICD-10-CM

## 2023-11-29 DIAGNOSIS — R35.0 URINARY FREQUENCY: ICD-10-CM

## 2023-11-29 LAB
ALBUMIN UR-MCNC: 30 MG/DL
APPEARANCE UR: ABNORMAL
BACTERIA #/AREA URNS HPF: ABNORMAL /HPF
BILIRUB UR QL STRIP: NEGATIVE
COLOR UR AUTO: YELLOW
GLUCOSE UR STRIP-MCNC: NEGATIVE MG/DL
HGB UR QL STRIP: ABNORMAL
KETONES UR STRIP-MCNC: ABNORMAL MG/DL
LEUKOCYTE ESTERASE UR QL STRIP: ABNORMAL
NITRATE UR QL: NEGATIVE
PH UR STRIP: 6 [PH] (ref 5–7)
RBC #/AREA URNS AUTO: ABNORMAL /HPF
SP GR UR STRIP: 1.02 (ref 1–1.03)
SQUAMOUS #/AREA URNS AUTO: ABNORMAL /LPF
UROBILINOGEN UR STRIP-ACNC: 0.2 E.U./DL
WBC #/AREA URNS AUTO: >100 /HPF

## 2023-11-29 PROCEDURE — 87186 SC STD MICRODIL/AGAR DIL: CPT

## 2023-11-29 PROCEDURE — 87086 URINE CULTURE/COLONY COUNT: CPT

## 2023-11-29 PROCEDURE — 81001 URINALYSIS AUTO W/SCOPE: CPT

## 2023-11-29 RX ORDER — NITROFURANTOIN 25; 75 MG/1; MG/1
100 CAPSULE ORAL 2 TIMES DAILY
Qty: 10 CAPSULE | Refills: 0 | Status: SHIPPED | OUTPATIENT
Start: 2023-11-29 | End: 2023-12-04

## 2023-11-30 LAB
BACTERIA UR CULT: ABNORMAL
BACTERIA UR CULT: ABNORMAL

## 2023-12-01 ENCOUNTER — HOSPITAL ENCOUNTER (OUTPATIENT)
Dept: MAMMOGRAPHY | Facility: CLINIC | Age: 73
Discharge: HOME | End: 2023-12-01
Attending: NURSE PRACTITIONER | Admitting: NURSE PRACTITIONER
Payer: COMMERCIAL

## 2023-12-01 DIAGNOSIS — Z12.31 VISIT FOR SCREENING MAMMOGRAM: ICD-10-CM

## 2023-12-01 PROCEDURE — 77067 SCR MAMMO BI INCL CAD: CPT

## 2023-12-04 ASSESSMENT — ENCOUNTER SYMPTOMS
PALPITATIONS: 0
COUGH: 0
BREAST MASS: 0
MYALGIAS: 0
ARTHRALGIAS: 0
SHORTNESS OF BREATH: 0
SORE THROAT: 0
DIZZINESS: 0
NAUSEA: 0
HEARTBURN: 0
CONSTIPATION: 0
ABDOMINAL PAIN: 0
PARESTHESIAS: 0
FREQUENCY: 0
HEMATURIA: 0
DYSURIA: 0
DIARRHEA: 0
WEAKNESS: 0
JOINT SWELLING: 0
HEMATOCHEZIA: 0
NERVOUS/ANXIOUS: 0
CHILLS: 0
HEADACHES: 0
FEVER: 0
EYE PAIN: 0

## 2023-12-04 ASSESSMENT — ACTIVITIES OF DAILY LIVING (ADL): CURRENT_FUNCTION: NO ASSISTANCE NEEDED

## 2023-12-05 ENCOUNTER — MYC MEDICAL ADVICE (OUTPATIENT)
Dept: FAMILY MEDICINE | Facility: CLINIC | Age: 73
End: 2023-12-05
Payer: COMMERCIAL

## 2023-12-06 ENCOUNTER — OFFICE VISIT (OUTPATIENT)
Dept: FAMILY MEDICINE | Facility: CLINIC | Age: 73
End: 2023-12-06
Attending: NURSE PRACTITIONER
Payer: COMMERCIAL

## 2023-12-06 VITALS
RESPIRATION RATE: 21 BRPM | HEART RATE: 70 BPM | TEMPERATURE: 97.6 F | HEIGHT: 65 IN | DIASTOLIC BLOOD PRESSURE: 78 MMHG | OXYGEN SATURATION: 98 % | BODY MASS INDEX: 40.87 KG/M2 | SYSTOLIC BLOOD PRESSURE: 132 MMHG | WEIGHT: 245.3 LBS

## 2023-12-06 DIAGNOSIS — Z00.00 ENCOUNTER FOR MEDICARE ANNUAL WELLNESS EXAM: Primary | ICD-10-CM

## 2023-12-06 DIAGNOSIS — I10 BENIGN ESSENTIAL HYPERTENSION: ICD-10-CM

## 2023-12-06 DIAGNOSIS — E03.9 HYPOTHYROIDISM, UNSPECIFIED TYPE: ICD-10-CM

## 2023-12-06 DIAGNOSIS — Z12.11 SPECIAL SCREENING FOR MALIGNANT NEOPLASMS, COLON: ICD-10-CM

## 2023-12-06 DIAGNOSIS — M81.0 OSTEOPOROSIS, UNSPECIFIED OSTEOPOROSIS TYPE, UNSPECIFIED PATHOLOGICAL FRACTURE PRESENCE: ICD-10-CM

## 2023-12-06 DIAGNOSIS — E78.5 HYPERLIPIDEMIA LDL GOAL <130: ICD-10-CM

## 2023-12-06 DIAGNOSIS — N39.0 RECURRENT UTI: ICD-10-CM

## 2023-12-06 DIAGNOSIS — J84.10 PULMONARY FIBROSIS (H): ICD-10-CM

## 2023-12-06 PROCEDURE — G0439 PPPS, SUBSEQ VISIT: HCPCS | Performed by: NURSE PRACTITIONER

## 2023-12-06 PROCEDURE — 99214 OFFICE O/P EST MOD 30 MIN: CPT | Mod: 25 | Performed by: NURSE PRACTITIONER

## 2023-12-06 RX ORDER — ALENDRONATE SODIUM 70 MG/1
TABLET ORAL
Qty: 12 TABLET | Refills: 0 | Status: CANCELLED | OUTPATIENT
Start: 2023-12-06

## 2023-12-06 RX ORDER — VALSARTAN 160 MG/1
160 TABLET ORAL DAILY
Qty: 90 TABLET | Refills: 3 | Status: SHIPPED | OUTPATIENT
Start: 2023-12-06 | End: 2024-04-29

## 2023-12-06 RX ORDER — LEVOTHYROXINE SODIUM 100 UG/1
100 TABLET ORAL DAILY
Qty: 90 TABLET | Refills: 3 | Status: SHIPPED | OUTPATIENT
Start: 2023-12-06 | End: 2024-04-29

## 2023-12-06 RX ORDER — SIMVASTATIN 20 MG
20 TABLET ORAL AT BEDTIME
Qty: 90 TABLET | Refills: 3 | Status: SHIPPED | OUTPATIENT
Start: 2023-12-06 | End: 2024-04-29

## 2023-12-06 ASSESSMENT — ACTIVITIES OF DAILY LIVING (ADL): CURRENT_FUNCTION: NO ASSISTANCE NEEDED

## 2023-12-06 ASSESSMENT — ENCOUNTER SYMPTOMS
EYE PAIN: 0
ARTHRALGIAS: 0
FEVER: 0
NAUSEA: 0
HEARTBURN: 0
NERVOUS/ANXIOUS: 0
HEMATOCHEZIA: 0
FREQUENCY: 0
WEAKNESS: 0
SORE THROAT: 0
ABDOMINAL PAIN: 0
CHILLS: 0
PARESTHESIAS: 0
COUGH: 0
MYALGIAS: 0
BREAST MASS: 0
SHORTNESS OF BREATH: 0
CONSTIPATION: 0
DYSURIA: 0
HEMATURIA: 0
DIARRHEA: 0
PALPITATIONS: 0
HEADACHES: 0
JOINT SWELLING: 0
DIZZINESS: 0

## 2023-12-06 ASSESSMENT — PAIN SCALES - GENERAL: PAINLEVEL: NO PAIN (0)

## 2023-12-06 NOTE — PROGRESS NOTES
"SUBJECTIVE:   Rhonda is a 73 year old, presenting for the following:  Medicare Visit        12/6/2023     2:21 PM   Additional Questions   Roomed by KOJO Bruce   Accompanied by self         12/6/2023     2:21 PM   Patient Reported Additional Medications   Patient reports taking the following new medications none       Are you in the first 12 months of your Medicare coverage?  No    Healthy Habits:     In general, how would you rate your overall health?  Good    Frequency of exercise:  2-3 days/week    Duration of exercise:  15-30 minutes    Do you usually eat at least 4 servings of fruit and vegetables a day, include whole grains    & fiber and avoid regularly eating high fat or \"junk\" foods?  Yes    Taking medications regularly:  Yes    Medication side effects:  Not applicable    Ability to successfully perform activities of daily living:  No assistance needed    Home Safety:  No safety concerns identified    Hearing Impairment:  No hearing concerns    In the past 6 months, have you been bothered by leaking of urine? Yes    In general, how would you rate your overall mental or emotional health?  Excellent    Additional concerns today:  Yes    She has been seeing pulmonology for pulmonary fibrosis.  She will be starting pulmonary rehab.    She would like to get a UA to make sure she doesn't have a UTI since she has been getting them frequently.    Last DEXA showed improvement, now only osteopenia and not high risk for fracture.    Her blood pressure has been well-controlled on her current medications.      Today's PHQ-2 Score:       12/5/2023     1:45 PM   PHQ-2 ( 1999 [employer])   Q1: Little interest or pleasure in doing things 0   Q2: Feeling down, depressed or hopeless 0   PHQ-2 Score 0   Q1: Little interest or pleasure in doing things Not at all   Q2: Feeling down, depressed or hopeless Not at all   PHQ-2 Score 0           Have you ever done Advance Care Planning? (For example, a Health Directive, POLST, or a " discussion with a medical provider or your loved ones about your wishes): Yes, advance care planning is on file.       Fall risk  Fallen 2 or more times in the past year?: No  Any fall with injury in the past year?: No    Cognitive Screening   1) Repeat 3 items (Leader, Season, Table)    2) Clock draw: NORMAL  3) 3 item recall: Recalls 3 objects  Results: 3 items recalled: COGNITIVE IMPAIRMENT LESS LIKELY    Mini-CogTM Copyright YARI Hernandez. Licensed by the author for use in [facility]; reprinted with permission (hanny@[facility]). All rights reserved.          Reviewed and updated as needed this visit by clinical staff   Tobacco  Allergies  Meds  Problems  Med Hx  Surg Hx  Fam Hx          Reviewed and updated as needed this visit by Provider   Tobacco  Allergies  Meds  Problems  Med Hx  Surg Hx  Fam Hx         Social History     Tobacco Use    Smoking status: Never    Smokeless tobacco: Never   Substance Use Topics    Alcohol use: Yes     Comment: very light use             12/4/2023     3:51 PM   Alcohol Use   Prescreen: >3 drinks/day or >7 drinks/week? No          No data to display              Do you have a current opioid prescription? No  Do you use any other controlled substances or medications that are not prescribed by a provider? None              Current providers sharing in care for this patient include:   Patient Care Team:  Corina Lara NP as PCP - General  Abdiel Torres MD as Assigned Pulmonology Provider  Abdiel Torres MD as MD (Critical Care)  Corina Lara NP as Assigned PCP  Blu Burger EP as Cardiac Rehabilitation Therapist    The following health maintenance items are reviewed in Epic and correct as of today:  Health Maintenance   Topic Date Due    RSV VACCINE (Pregnancy & 60+) (1 - 1-dose 60+ series) Never done    DTAP/TDAP/TD IMMUNIZATION (2 - Td or Tdap) 05/06/2021    COLORECTAL CANCER SCREENING  05/03/2023    BMP  11/16/2023    ANNUAL  "REVIEW OF HM ORDERS  11/16/2023    MEDICARE ANNUAL WELLNESS VISIT  11/16/2023    TSH W/FREE T4 REFLEX  11/16/2023    FALL RISK ASSESSMENT  12/06/2024    MAMMO SCREENING  12/01/2025    LIPID  11/16/2027    ADVANCE CARE PLANNING  12/06/2028    DEXA  11/15/2036    HEPATITIS C SCREENING  Completed    PHQ-2 (once per calendar year)  Completed    INFLUENZA VACCINE  Completed    Pneumococcal Vaccine: 65+ Years  Completed    ZOSTER IMMUNIZATION  Completed    COVID-19 Vaccine  Completed    IPV IMMUNIZATION  Aged Out    HPV IMMUNIZATION  Aged Out    MENINGITIS IMMUNIZATION  Aged Out    RSV MONOCLONAL ANTIBODY  Aged Out               Review of Systems   Constitutional:  Negative for chills and fever.   HENT:  Negative for congestion, ear pain, hearing loss and sore throat.    Eyes:  Negative for pain and visual disturbance.   Respiratory:  Negative for cough and shortness of breath.    Cardiovascular:  Negative for chest pain, palpitations and peripheral edema.   Gastrointestinal:  Negative for abdominal pain, constipation, diarrhea, heartburn, hematochezia and nausea.   Breasts:  Negative for tenderness, breast mass and discharge.   Genitourinary:  Negative for dysuria, frequency, genital sores, hematuria, pelvic pain, urgency, vaginal bleeding and vaginal discharge.   Musculoskeletal:  Negative for arthralgias, joint swelling and myalgias.   Skin:  Negative for rash.   Neurological:  Negative for dizziness, weakness, headaches and paresthesias.   Psychiatric/Behavioral:  Negative for mood changes. The patient is not nervous/anxious.          OBJECTIVE:   /78 (BP Location: Right arm, Patient Position: Sitting, Cuff Size: Adult Large)   Pulse 70   Temp 97.6  F (36.4  C) (Temporal)   Resp 21   Ht 1.638 m (5' 4.5\")   Wt 111.3 kg (245 lb 4.8 oz)   LMP 08/01/2004   SpO2 98%   BMI 41.46 kg/m   Estimated body mass index is 41.46 kg/m  as calculated from the following:    Height as of this encounter: 1.638 m (5' " "4.5\").    Weight as of this encounter: 111.3 kg (245 lb 4.8 oz).  Physical Exam  GENERAL: healthy, alert and no distress  EYES: Eyes grossly normal to inspection, PERRL and conjunctivae and sclerae normal  HENT: ear canals and TM's normal, nose and mouth without ulcers or lesions  NECK: no adenopathy, no asymmetry, masses, or scars and thyroid normal to palpation  RESP: lungs clear to auscultation - no rales, rhonchi or wheezes  CV: regular rate and rhythm, normal S1 S2, no S3 or S4, no murmur, click or rub, no peripheral edema and peripheral pulses strong  ABDOMEN: soft, nontender, no hepatosplenomegaly, no masses and bowel sounds normal  MS: no gross musculoskeletal defects noted, no edema  SKIN: no suspicious lesions or rashes  NEURO: Normal strength and tone, mentation intact and speech normal  PSYCH: mentation appears normal, affect normal/bright        ASSESSMENT / PLAN:   (Z00.00) Encounter for Medicare annual wellness exam  (primary encounter diagnosis)  Comment:   Plan:     (M81.0) Osteoporosis, unspecified osteoporosis type, unspecified pathological fracture presence  Comment:   Plan: DX Hip/Pelvis/Spine        She will get an updated DEXA and can stop her Fosamax once she is done with her current supply and then will repeat a DEXA again in 2 years.     (E03.9) Hypothyroidism, unspecified type  Comment: stable  Plan: levothyroxine (SYNTHROID/LEVOTHROID) 100 MCG         tablet, TSH with free T4 reflex        The current medical regimen is effective;  continue present plan and medications.     (E78.5) Hyperlipidemia LDL goal <130  Comment: stable  Plan: simvastatin (ZOCOR) 20 MG tablet, Lipid panel         reflex to direct LDL Fasting        The current medical regimen is effective;  continue present plan and medications.     (I10) Benign essential hypertension  Comment: at goal  Plan: valsartan (DIOVAN) 160 MG tablet, Basic         metabolic panel  (Ca, Cl, CO2, Creat, Gluc, K,         Na, BUN)        The " "current medical regimen is effective;  continue present plan and medications.     (N39.0) Recurrent UTI  Comment:   Plan: UA with Microscopic reflex to Culture - lab         collect            (Z12.11) Special screening for malignant neoplasms, colon  Comment:   Plan: Colonoscopy Screening  Referral            (J84.10) Pulmonary fibrosis (H)  Comment: stable  Plan: She will follow up with pulmonology.          COUNSELING:  Reviewed preventive health counseling, as reflected in patient instructions      BMI:   Estimated body mass index is 41.46 kg/m  as calculated from the following:    Height as of this encounter: 1.638 m (5' 4.5\").    Weight as of this encounter: 111.3 kg (245 lb 4.8 oz).         She reports that she has never smoked. She has never used smokeless tobacco.      Appropriate preventive services were discussed with this patient, including applicable screening as appropriate for fall prevention, nutrition, physical activity, Tobacco-use cessation, weight loss and cognition.  Checklist reviewing preventive services available has been given to the patient.    Reviewed patients plan of care and provided an AVS. The Basic Care Plan (routine screening as documented in Health Maintenance) for Rhonda meets the Care Plan requirement. This Care Plan has been established and reviewed with the Patient.          Corina Lara NP  [facility]    Identified Health Risks:    "

## 2023-12-06 NOTE — PATIENT INSTRUCTIONS
Patient Education   Personalized Prevention Plan  You are due for the preventive services outlined below.  Your care team is available to assist you in scheduling these services.  If you have already completed any of these items, please share that information with your care team to update in your medical record.  Health Maintenance Due   Topic Date Due     RSV VACCINE (Pregnancy & 60+) (1 - 1-dose 60+ series) Never done     Diptheria Tetanus Pertussis (DTAP/TDAP/TD) Vaccine (2 - Td or Tdap) 05/06/2021     Colorectal Cancer Screening  05/03/2023     Basic Metabolic Panel  11/16/2023     ANNUAL REVIEW OF HM ORDERS  11/16/2023     Annual Wellness Visit  11/16/2023     Thyroid Function Lab  11/16/2023     Bladder Training: Care Instructions  Your Care Instructions     Bladder training is used to treat urge incontinence and stress incontinence. Urge incontinence means that the need to urinate comes on so fast that you can't get to a toilet in time. Stress incontinence means that you leak urine because of pressure on your bladder. For example, it may happen when you laugh, cough, or lift something heavy.  Bladder training can increase how long you can wait before you have to urinate. It can also help your bladder hold more urine. And it can give you better control over the urge to urinate.  It is important to remember that bladder training takes a few weeks to a few months to make a difference. You may not see results right away, but don't give up.  Follow-up care is a key part of your treatment and safety. Be sure to make and go to all appointments, and call your doctor if you are having problems. It's also a good idea to know your test results and keep a list of the medicines you take.  How can you care for yourself at home?  Work with your doctor to come up with a bladder training program that is right for you. You may use one or more of the following methods.  Delayed urination  In the beginning, try to keep from  "urinating for 5 minutes after you first feel the need to go.  While you wait, take deep, slow breaths to relax. Kegel exercises can also help you delay the need to go to the bathroom.  After some practice, when you can easily wait 5 minutes to urinate, try to wait 10 minutes before you urinate.  Slowly increase the waiting period until you are able to control when you have to urinate.  Scheduled urination  Empty your bladder when you first wake up in the morning.  Schedule times throughout the day when you will urinate.  Start by going to the bathroom every hour, even if you don't need to go.  Slowly increase the time between trips to the bathroom.  When you have found a schedule that works well for you, keep doing it.  If you wake up during the night and have to urinate, do it. Apply your schedule to waking hours only.  Kegel exercises  These tighten and strengthen pelvic muscles, which can help you control the flow of urine. (If doing these exercises causes pain, stop doing them and talk with your doctor.) To do Kegel exercises:  Squeeze your muscles as if you were trying not to pass gas. Or squeeze your muscles as if you were stopping the flow of urine. Your belly, legs, and buttocks shouldn't move.  Hold the squeeze for 3 seconds, then relax for 5 to 10 seconds.  Start with 3 seconds, then add 1 second each week until you are able to squeeze for 10 seconds.  Repeat the exercise 10 times a session. Do 3 to 8 sessions a day.  When should you call for help?  Watch closely for changes in your health, and be sure to contact your doctor if:    Your incontinence is getting worse.     You do not get better as expected.   Where can you learn more?  Go to https://www.[employer].net/patiented  Enter V684 in the search box to learn more about \"Bladder Training: Care Instructions.\"  Current as of: February 28, 2023               Content Version: 13.8    2823-6068 [employer].   Care instructions adapted under " license by your healthcare professional. If you have questions about a medical condition or this instruction, always ask your healthcare professional. [employer] disclaims any warranty or liability for your use of this information.

## 2023-12-07 ENCOUNTER — HOSPITAL ENCOUNTER (OUTPATIENT)
Dept: CARDIAC REHAB | Facility: CLINIC | Age: 73
Discharge: HOME | End: 2023-12-07
Attending: STUDENT IN AN ORGANIZED HEALTH CARE EDUCATION/TRAINING PROGRAM
Payer: COMMERCIAL

## 2023-12-07 PROCEDURE — G0238 OTH RESP PROC, INDIV: HCPCS

## 2023-12-08 ENCOUNTER — LAB (OUTPATIENT)
Dept: LAB | Facility: CLINIC | Age: 73
End: 2023-12-08
Payer: COMMERCIAL

## 2023-12-08 DIAGNOSIS — I10 BENIGN ESSENTIAL HYPERTENSION: ICD-10-CM

## 2023-12-08 DIAGNOSIS — R39.9 SYMPTOMS INVOLVING URINARY SYSTEM: ICD-10-CM

## 2023-12-08 DIAGNOSIS — N39.0 RECURRENT UTI: ICD-10-CM

## 2023-12-08 DIAGNOSIS — E78.5 HYPERLIPIDEMIA LDL GOAL <130: ICD-10-CM

## 2023-12-08 DIAGNOSIS — E03.9 HYPOTHYROIDISM, UNSPECIFIED TYPE: ICD-10-CM

## 2023-12-08 LAB
ALBUMIN UR-MCNC: NEGATIVE MG/DL
ANION GAP SERPL CALCULATED.3IONS-SCNC: 9 MMOL/L (ref 7–15)
APPEARANCE UR: CLEAR
BACTERIA #/AREA URNS HPF: ABNORMAL /HPF
BILIRUB UR QL STRIP: NEGATIVE
BUN SERPL-MCNC: 9.1 MG/DL (ref 8–23)
CALCIUM SERPL-MCNC: 9.3 MG/DL (ref 8.8–10.2)
CHLORIDE SERPL-SCNC: 99 MMOL/L (ref 98–107)
CHOLEST SERPL-MCNC: 162 MG/DL
COLOR UR AUTO: YELLOW
CREAT SERPL-MCNC: 0.81 MG/DL (ref 0.51–0.95)
DEPRECATED HCO3 PLAS-SCNC: 25 MMOL/L (ref 22–29)
EGFRCR SERPLBLD CKD-EPI 2021: 77 ML/MIN/1.73M2
FASTING STATUS PATIENT QL REPORTED: YES
GLUCOSE SERPL-MCNC: 93 MG/DL (ref 70–99)
GLUCOSE UR STRIP-MCNC: NEGATIVE MG/DL
HDLC SERPL-MCNC: 70 MG/DL
HGB UR QL STRIP: ABNORMAL
HYALINE CASTS #/AREA URNS LPF: ABNORMAL /LPF
KETONES UR STRIP-MCNC: NEGATIVE MG/DL
LDLC SERPL CALC-MCNC: 75 MG/DL
LEUKOCYTE ESTERASE UR QL STRIP: ABNORMAL
MUCOUS THREADS #/AREA URNS LPF: PRESENT /LPF
NITRATE UR QL: NEGATIVE
NONHDLC SERPL-MCNC: 92 MG/DL
PH UR STRIP: 8 [PH] (ref 5–7)
POTASSIUM SERPL-SCNC: 4.8 MMOL/L (ref 3.4–5.3)
RBC #/AREA URNS AUTO: ABNORMAL /HPF
SODIUM SERPL-SCNC: 133 MMOL/L (ref 135–145)
SP GR UR STRIP: 1.01 (ref 1–1.03)
SQUAMOUS #/AREA URNS AUTO: ABNORMAL /LPF
TRIGL SERPL-MCNC: 85 MG/DL
TSH SERPL DL<=0.005 MIU/L-ACNC: 1.58 UIU/ML (ref 0.3–4.2)
UROBILINOGEN UR STRIP-ACNC: 0.2 E.U./DL
WBC #/AREA URNS AUTO: ABNORMAL /HPF

## 2023-12-08 PROCEDURE — 84443 ASSAY THYROID STIM HORMONE: CPT

## 2023-12-08 PROCEDURE — 81001 URINALYSIS AUTO W/SCOPE: CPT

## 2023-12-08 PROCEDURE — 80061 LIPID PANEL: CPT

## 2023-12-08 PROCEDURE — 36415 COLL VENOUS BLD VENIPUNCTURE: CPT

## 2023-12-08 PROCEDURE — 80048 BASIC METABOLIC PNL TOTAL CA: CPT

## 2023-12-10 ENCOUNTER — MYC MEDICAL ADVICE (OUTPATIENT)
Dept: FAMILY MEDICINE | Facility: CLINIC | Age: 73
End: 2023-12-10
Payer: COMMERCIAL

## 2023-12-10 DIAGNOSIS — R39.9 SYMPTOMS INVOLVING URINARY SYSTEM: Primary | ICD-10-CM

## 2023-12-10 DIAGNOSIS — N39.0 RECURRENT UTI: ICD-10-CM

## 2023-12-11 ENCOUNTER — DOCUMENTATION ONLY (OUTPATIENT)
Dept: LAB | Facility: CLINIC | Age: 73
End: 2023-12-11
Payer: COMMERCIAL

## 2023-12-11 NOTE — TELEPHONE ENCOUNTER
"Erica-Please review and advise:    \"Erica, I was surprised when I saw the results from my urine test last week. I wasn't having any symptoms of a UTI so the abnormal results were unexpected. So now it's Sunday evening and just in the past couple of hours, I've noticed a slight urgency to urinate. Again, I thought I was being very careful but apparently something got in there. So if you approve, could I have antibiotics to clear up the UTI? I saw the advice in the after visit report and will try to follow that advice. Otherwise, can you tell me why this keeps happening? If you agree that I need medication, please send the prescription to [facility] on [address] in [place]. Thank you! Gwyn Trotter \"    Thank you!  MALICK RendonN, RN-[facility]    "

## 2023-12-12 ENCOUNTER — HOSPITAL ENCOUNTER (OUTPATIENT)
Dept: CARDIAC REHAB | Facility: CLINIC | Age: 73
Discharge: HOME | End: 2023-12-12
Attending: STUDENT IN AN ORGANIZED HEALTH CARE EDUCATION/TRAINING PROGRAM
Payer: COMMERCIAL

## 2023-12-12 PROCEDURE — G0239 OTH RESP PROC, GROUP: HCPCS

## 2023-12-12 NOTE — TELEPHONE ENCOUNTER
I checked with the lab and the UA did not reflex to culture as WBCs not over 10. Urine has now been thrown.  Note written to pt with Erica's advice    Merle Ureña RN, BSN  [facility]

## 2023-12-12 NOTE — PROGRESS NOTES
The add-on test Urine culture that was requested on 2023 is unable to be completed due to  Specimen. Future order is available for collection. If labs are needed before next appointment, please arrange for collection.

## 2023-12-12 NOTE — TELEPHONE ENCOUNTER
Her results were not obvious for infection and likely aren't a true infection.  Can you see if the lab can add on a culture, if not, she could make a lab-only appointment to test.

## 2023-12-13 ENCOUNTER — MYC MEDICAL ADVICE (OUTPATIENT)
Dept: FAMILY MEDICINE | Facility: CLINIC | Age: 73
End: 2023-12-13
Payer: COMMERCIAL

## 2023-12-13 NOTE — PROGRESS NOTES
Writer spoke with patient. Patient received a [employer] message stating did not have to recheck at this time.     Message from 12/12/2023.   Erica stated --  The  results were not obvious for infection and likely aren't a true infection. But if you are still having symptoms she would like you to make an appointment in the lab to repeat the test and do a urine culture this time.     Merle Ureña, RN, BSN  [facility]     MALICK PolancoN RN  [facility]

## 2023-12-14 ENCOUNTER — HOSPITAL ENCOUNTER (OUTPATIENT)
Dept: CARDIAC REHAB | Facility: CLINIC | Age: 73
Discharge: HOME | End: 2023-12-14
Attending: STUDENT IN AN ORGANIZED HEALTH CARE EDUCATION/TRAINING PROGRAM
Payer: COMMERCIAL

## 2023-12-14 ENCOUNTER — OFFICE VISIT (OUTPATIENT)
Dept: URGENT CARE | Facility: URGENT CARE | Age: 73
End: 2023-12-14
Payer: COMMERCIAL

## 2023-12-14 ENCOUNTER — ANCILLARY PROCEDURE (OUTPATIENT)
Dept: GENERAL RADIOLOGY | Facility: CLINIC | Age: 73
End: 2023-12-14
Attending: PHYSICIAN ASSISTANT
Payer: COMMERCIAL

## 2023-12-14 VITALS
OXYGEN SATURATION: 97 % | TEMPERATURE: 97.8 F | RESPIRATION RATE: 16 BRPM | SYSTOLIC BLOOD PRESSURE: 120 MMHG | DIASTOLIC BLOOD PRESSURE: 78 MMHG | HEART RATE: 72 BPM

## 2023-12-14 DIAGNOSIS — R20.2 PARESTHESIAS: Primary | ICD-10-CM

## 2023-12-14 DIAGNOSIS — R20.2 PARESTHESIAS: ICD-10-CM

## 2023-12-14 DIAGNOSIS — R19.7 DIARRHEA, UNSPECIFIED TYPE: ICD-10-CM

## 2023-12-14 LAB
ALBUMIN UR-MCNC: NEGATIVE MG/DL
ANION GAP SERPL CALCULATED.3IONS-SCNC: 10 MMOL/L (ref 3–14)
APPEARANCE UR: CLEAR
BILIRUB UR QL STRIP: NEGATIVE
BUN SERPL-MCNC: 8 MG/DL (ref 7–30)
CALCIUM SERPL-MCNC: 9.5 MG/DL (ref 8.5–10.1)
CHLORIDE BLD-SCNC: 101 MMOL/L (ref 94–109)
CO2 SERPL-SCNC: 28 MMOL/L (ref 20–32)
COLOR UR AUTO: YELLOW
CREAT SERPL-MCNC: 0.9 MG/DL (ref 0.52–1.04)
EGFRCR SERPLBLD CKD-EPI 2021: 68 ML/MIN/1.73M2
ERYTHROCYTE [DISTWIDTH] IN BLOOD BY AUTOMATED COUNT: 13.3 % (ref 10–15)
GLUCOSE BLD-MCNC: 99 MG/DL (ref 70–99)
GLUCOSE UR STRIP-MCNC: NEGATIVE MG/DL
HCT VFR BLD AUTO: 44.3 % (ref 35–47)
HGB BLD-MCNC: 15 G/DL (ref 11.7–15.7)
HGB UR QL STRIP: ABNORMAL
KETONES UR STRIP-MCNC: NEGATIVE MG/DL
LEUKOCYTE ESTERASE UR QL STRIP: ABNORMAL
MCH RBC QN AUTO: 32.1 PG (ref 26.5–33)
MCHC RBC AUTO-ENTMCNC: 33.9 G/DL (ref 31.5–36.5)
MCV RBC AUTO: 95 FL (ref 78–100)
NITRATE UR QL: NEGATIVE
PH UR STRIP: 6.5 [PH] (ref 5–7)
PLATELET # BLD AUTO: 211 10E3/UL (ref 150–450)
POTASSIUM BLD-SCNC: 4.4 MMOL/L (ref 3.4–5.3)
RBC # BLD AUTO: 4.68 10E6/UL (ref 3.8–5.2)
RBC #/AREA URNS AUTO: ABNORMAL /HPF
SODIUM SERPL-SCNC: 139 MMOL/L (ref 133–144)
SP GR UR STRIP: 1.01 (ref 1–1.03)
UROBILINOGEN UR STRIP-ACNC: 0.2 E.U./DL
VIT D+METAB SERPL-MCNC: 41 NG/ML (ref 20–50)
WBC # BLD AUTO: 6.9 10E3/UL (ref 4–11)
WBC #/AREA URNS AUTO: ABNORMAL /HPF

## 2023-12-14 PROCEDURE — 93000 ELECTROCARDIOGRAM COMPLETE: CPT | Performed by: PHYSICIAN ASSISTANT

## 2023-12-14 PROCEDURE — 85027 COMPLETE CBC AUTOMATED: CPT | Performed by: PHYSICIAN ASSISTANT

## 2023-12-14 PROCEDURE — 36415 COLL VENOUS BLD VENIPUNCTURE: CPT | Performed by: PHYSICIAN ASSISTANT

## 2023-12-14 PROCEDURE — 80048 BASIC METABOLIC PNL TOTAL CA: CPT | Performed by: PHYSICIAN ASSISTANT

## 2023-12-14 PROCEDURE — G0239 OTH RESP PROC, GROUP: HCPCS | Performed by: CLINICAL EXERCISE PHYSIOLOGIST

## 2023-12-14 PROCEDURE — 81001 URINALYSIS AUTO W/SCOPE: CPT | Performed by: PHYSICIAN ASSISTANT

## 2023-12-14 PROCEDURE — 82306 VITAMIN D 25 HYDROXY: CPT | Performed by: PHYSICIAN ASSISTANT

## 2023-12-14 PROCEDURE — 71046 X-RAY EXAM CHEST 2 VIEWS: CPT | Mod: TC | Performed by: RADIOLOGY

## 2023-12-14 PROCEDURE — 87086 URINE CULTURE/COLONY COUNT: CPT

## 2023-12-14 PROCEDURE — 99213 OFFICE O/P EST LOW 20 MIN: CPT | Mod: 25 | Performed by: PHYSICIAN ASSISTANT

## 2023-12-14 NOTE — PROGRESS NOTES
SUBJECTIVE  HPI:  Rhonda Trotter is a 73 year old female who presents with the CC of loose stools 1-2x a day. Follows indulging in pizza, vaccines.  Has had tingling sensations around body.  Interittent and fleeting.  Feels like previous episodes.       Past Medical History:   Diagnosis Date    Arthritis     Obesity, unspecified     Pure hypercholesterolemia     Unspecified essential hypertension     Unspecified hypothyroidism      Current Outpatient Medications   Medication Sig Dispense Refill    albuterol (PROAIR HFA/PROVENTIL HFA/VENTOLIN HFA) 108 (90 Base) MCG/ACT inhaler Inhale 2 puffs into the lungs every 6 hours as needed for shortness of breath, wheezing or cough 18 g 3    alendronate (FOSAMAX) 70 MG tablet TAKE 1 TABLET EVERY 7 DAYS WITH 8 OUNCES OF WATER 30 MINUTES BEFORE BREAKFAST AND REMAIN UPRIGHT DURING THIS TIME 12 tablet 0    calcium carbonate (TUMS) 500 MG chewable tablet Take 1-2 chew tab by mouth daily as needed for heartburn      Cobalamine Combinations (B-12) 100-5000 MCG SUBL Place 1 tablet under the tongue daily as needed 50 tablet 0    diphenhydrAMINE (BENADRYL) 25 MG capsule Take 1 capsule (25 mg) by mouth nightly as needed for sleep 56 capsule     fluticasone (FLONASE) 50 MCG/ACT spray USE 2 SPRAYS IN EACH NOSTRIL DAILY 16 g PRN    levothyroxine (SYNTHROID/LEVOTHROID) 100 MCG tablet Take 1 tablet (100 mcg) by mouth daily 90 tablet 3    MAGNESIUM PO Take by mouth daily Magnesium 7      multivitamin, therapeutic with minerals (THERA-VIT-M) TABS Take 1 tablet by mouth daily 30 each 0    simvastatin (ZOCOR) 20 MG tablet Take 1 tablet (20 mg) by mouth at bedtime 90 tablet 3    valsartan (DIOVAN) 160 MG tablet Take 1 tablet (160 mg) by mouth daily 90 tablet 3    Calcium-Phosphorus-Vitamin D (CALCIUM GUMMIES) 250-100-500 MG-MG-UNIT CHEW Take 2 tablets by mouth daily as needed (Patient not taking: Reported on 4/17/2023)       Social History     Tobacco Use    Smoking status: Never    Smokeless  tobacco: Never   Substance Use Topics    Alcohol use: Yes     Comment: very light use       ROS:  Review of systems negative except as stated above.    OBJECTIVE:  /78   Pulse 72   Temp 97.8  F (36.6  C)   Resp 16   LMP 08/01/2004   SpO2 97%   GENERAL APPEARANCE: healthy, alert and no distress  RESP: lungs clear to auscultation - no rales, rhonchi or wheezes  CV: regular rates and rhythm, normal S1 S2, no murmur noted  ABDOMEN:  soft, nontender, no HSM or masses and bowel sounds normal  NEURO: Normal strength and tone, sensory exam grossly normal,  normal speech and mentation  SKIN: no suspicious lesions or rashes      Results for orders placed or performed in visit on 12/14/23   XR Chest 2 Views     Status: None (Preliminary result)    Narrative    CHEST TWO VIEWS  12/14/2023 2:30 PM     HISTORY: Paresthesias.    COMPARISON: 7/26/2023.      Impression    IMPRESSION: No acute cardiopulmonary disease. Bilateral interstitial  prominence again noted and may represent underlying interstitial lung  disease.   Results for orders placed or performed in visit on 12/14/23   UA Macroscopic with reflex to Microscopic and Culture - Clinic Collect     Status: Abnormal    Specimen: Urine, Clean Catch   Result Value Ref Range    Color Urine Yellow Colorless, Straw, Light Yellow, Yellow    Appearance Urine Clear Clear    Glucose Urine Negative Negative mg/dL    Bilirubin Urine Negative Negative    Ketones Urine Negative Negative mg/dL    Specific Gravity Urine 1.010 1.003 - 1.035    Blood Urine Trace (A) Negative    pH Urine 6.5 5.0 - 7.0    Protein Albumin Urine Negative Negative mg/dL    Urobilinogen Urine 0.2 0.2, 1.0 E.U./dL    Nitrite Urine Negative Negative    Leukocyte Esterase Urine Small (A) Negative   CBC with platelets     Status: Normal   Result Value Ref Range    WBC Count 6.9 4.0 - 11.0 10e3/uL    RBC Count 4.68 3.80 - 5.20 10e6/uL    Hemoglobin 15.0 11.7 - 15.7 g/dL    Hematocrit 44.3 35.0 - 47.0 %    MCV  95 78 - 100 fL    MCH 32.1 26.5 - 33.0 pg    MCHC 33.9 31.5 - 36.5 g/dL    RDW 13.3 10.0 - 15.0 %    Platelet Count 211 150 - 450 10e3/uL   Basic metabolic panel  (Ca, Cl, CO2, Creat, Gluc, K, Na, BUN)     Status: Normal   Result Value Ref Range    Sodium 139 133 - 144 mmol/L    Potassium 4.4 3.4 - 5.3 mmol/L    Chloride 101 94 - 109 mmol/L    Carbon Dioxide (CO2) 28 20 - 32 mmol/L    Anion Gap 10 3 - 14 mmol/L    Urea Nitrogen 8 7 - 30 mg/dL    Creatinine 0.90 0.52 - 1.04 mg/dL    GFR Estimate 68 >60 mL/min/1.73m2    Calcium 9.5 8.5 - 10.1 mg/dL    Glucose 99 70 - 99 mg/dL   UA Microscopic with Reflex to Culture     Status: Abnormal   Result Value Ref Range    RBC Urine 0-2 0-2 /HPF /HPF    WBC Urine 5-10 (A) 0-5 /HPF /HPF    Narrative    Urine Culture not indicated     EKG: sinus      ASSESSMENT:  (R19.7) Diarrhea, unspecified type  (R20.2) Paresthesias  (primary encounter diagnosis)  Comment: mild intermittent symptoms  Plan: CBC with platelets, Basic metabolic panel  (Ca,        Cl, CO2, Creat, Gluc, K, Na, BUN), UA         Macroscopic with reflex to Microscopic and         Culture - Clinic Collect, Vitamin D Deficiency,        EKG 12-lead complete w/read - Clinics, UA         Microscopic with Reflex to Culture, XR Chest 2         Views, CANCELED: UA Macroscopic with reflex to         Microscopic and Culture - Clinic Collect      Home, rest, hydrate, follow up if symtoms worse or fail to improve

## 2023-12-15 LAB — BACTERIA UR CULT: NORMAL

## 2023-12-18 NOTE — TELEPHONE ENCOUNTER
"Erica - patient still feeling like she has UTI sx. UC to relfex was not indicated at time of Urgent Care visit. Unsure how to best proceed, as these have been frequent concerns/ongoing sx. Pt not currently utilizing any estrogen tx, has been prescribed various abx, etc. Would you recommend a virtual visit to further discuss?    \"So here it is Sunday again, and I feel like I'm having definite UTI symptoms. It feels like it has been building up the past 2 or 3 days.  Again, it's very annoying but not painful. I actually went to urgent care last Friday because of different symptoms. As I was waiting to be seen, I felt like my symptoms were similar to how I felt in the spring of 2020 when I think I was stressed out because of COVID. After the doctor assured me I wasn't having anything serious, I went home feeling much better. However, I did notice some slight UTI symptoms and of course, they have increased since then. I did give a urine sample on Friday, but I don't know how to interpret the results. I don't suppose these symptoms will stop without antibiotics so am hoping either you can see the results from Friday's urine sample or I can go again, hopefully tomorrow. In the meantime, I have had UTI 6 times this year but have been given 4 different medications. Is there any way you can tell which one would be most effective for me? I'm assuming you have access to what I've gotten over the past year. I'm really trying to be careful to prevent another infection so I get frustrated when this happens again. Thank you for your help! Gwyn Trotter\"    MAKSIM Kulkarni, MALICKN, RN  [facility]    "

## 2023-12-19 ENCOUNTER — HOSPITAL ENCOUNTER (OUTPATIENT)
Dept: CARDIAC REHAB | Facility: CLINIC | Age: 73
Discharge: HOME | End: 2023-12-19
Attending: STUDENT IN AN ORGANIZED HEALTH CARE EDUCATION/TRAINING PROGRAM
Payer: COMMERCIAL

## 2023-12-19 ENCOUNTER — TELEPHONE (OUTPATIENT)
Dept: GASTROENTEROLOGY | Facility: CLINIC | Age: 73
End: 2023-12-19
Payer: COMMERCIAL

## 2023-12-19 PROCEDURE — G0239 OTH RESP PROC, GROUP: HCPCS

## 2023-12-19 NOTE — TELEPHONE ENCOUNTER
Her last two UAs and the urine culture from 12/14 did not show any infection.  A virtual visit is reasonable.  I did place a urology referral as well since she has had frequent UTIss.

## 2023-12-19 NOTE — TELEPHONE ENCOUNTER
"Endoscopy Scheduling Screen    Have you had a positive Covid test in the last 14 days?  No    Are you active on MyChart?   Yes    What insurance is in the chart?  Other:  [facility] Medicare    Ordering/Referring Provider: Marco   (If ordering provider performs procedure, schedule with ordering provider unless otherwise instructed. )    BMI: Estimated body mass index is 41.46 kg/m  as calculated from the following:    Height as of 12/6/23: 1.638 m (5' 4.5\").    Weight as of 12/6/23: 111.3 kg (245 lb 4.8 oz).     Sedation Ordered  MAC/deep sedation.   BMI<= 45 45 < BMI <= 48 48 < BMI < = 50  BMI > 50   No Restrictions No [facility]  No [facility]  [facility] with exceptions Hospital Only OR Only       Are you taking any prescription medications for pain 3 or more times per week?   NO - No RN review required.    Do you have a history of malignant hyperthermia or adverse reaction to anesthesia?  No    (Females) Are you currently pregnant?   No     Have you been diagnosed or told you have pulmonary hypertension?   No    Do you have an LVAD?  No    Have you been told you have moderate to severe sleep apnea?  No    Have you been told you have COPD, asthma, or any other lung disease?  Yes     What breathing problems do you have?  Scar tissue on lungs-doing respiratory therapy and using oxygen while doing therapy.  Does not use it at home  And for last colonoscopy at [facility], procedure was not done because oxygen levels were too low, and it was recommended that she have her colonoscopy done at the hospital     Do you use home oxygen?  No    Have your breathing problems required an ED visit or hospitalization in the last year?  No    Do you have any heart conditions?  No     Have you ever had an organ transplant?   No    Have you ever had or are you awaiting a heart or lung transplant?   No    Have you had a stroke or transient ischemic attack (TIA aka \"mini stroke\" in the last 6 months?   No    Have you been diagnosed with or been told " "you have cirrhosis of the liver?   No    Are you currently on dialysis?   No    Do you need assistance transferring?   No    BMI: Estimated body mass index is 41.46 kg/m  as calculated from the following:    Height as of 12/6/23: 1.638 m (5' 4.5\").    Weight as of 12/6/23: 111.3 kg (245 lb 4.8 oz).     Is patients BMI > 40 and scheduling location [facility]?  No    Do you take an injectable medication for weight loss or diabetes (excluding insulin)?  No    Do you take the medication Naltrexone?  No    Do you take blood thinners?  No       Prep   Are you currently on dialysis or do you have chronic kidney disease?  No    Do you have a diagnosis of diabetes?  No    Do you have a diagnosis of cystic fibrosis (CF)?  No    On a regular basis do you go 3 -5 days between bowel movements?  No    BMI > 40?  Yes (Extended Prep)    Preferred Pharmacy:    [id number] [address] [address]  Phone: [phone] Fax: [phone]      Final Scheduling Details   Colonoscopy prep sent?  Golytely Extended Prep    Procedure scheduled  Colonoscopy    Surgeon:  Eliu     Date of procedure:  3/13/24     Pre-OP / PAC:   Yes - Patient informed of pre-op requirement.    Location  [facility] - Patient preference.    Sedation   MAC/Deep Sedation - Per order.      Patient Reminders:   You will receive a call from a Nurse to review instructions and health history.  This assessment must be completed prior to your procedure.  Failure to complete the Nurse assessment may result in the procedure being cancelled.      On the day of your procedure, please designate an adult(s) who can drive you home stay with you for the next 24 hours. The medicines used in the exam will make you sleepy. You will not be able to drive.      You cannot take public transportation, ride share services, or non-medical taxi service without a responsible caregiver.  Medical transport services are allowed with the " requirement that a responsible caregiver will receive you at your destination.  We require that drivers and caregivers are confirmed prior to your procedure.

## 2023-12-20 ENCOUNTER — VIRTUAL VISIT (OUTPATIENT)
Dept: FAMILY MEDICINE | Facility: CLINIC | Age: 73
End: 2023-12-20
Payer: COMMERCIAL

## 2023-12-20 ENCOUNTER — LAB (OUTPATIENT)
Dept: LAB | Facility: CLINIC | Age: 73
End: 2023-12-20
Payer: COMMERCIAL

## 2023-12-20 DIAGNOSIS — N39.9 URINARY PROBLEM IN FEMALE: ICD-10-CM

## 2023-12-20 DIAGNOSIS — N39.9 URINARY PROBLEM IN FEMALE: Primary | ICD-10-CM

## 2023-12-20 LAB
ALBUMIN UR-MCNC: NEGATIVE MG/DL
APPEARANCE UR: CLEAR
BACTERIA #/AREA URNS HPF: ABNORMAL /HPF
BILIRUB UR QL STRIP: NEGATIVE
COLOR UR AUTO: YELLOW
GLUCOSE UR STRIP-MCNC: NEGATIVE MG/DL
HGB UR QL STRIP: ABNORMAL
KETONES UR STRIP-MCNC: NEGATIVE MG/DL
LEUKOCYTE ESTERASE UR QL STRIP: ABNORMAL
NITRATE UR QL: POSITIVE
PH UR STRIP: 6 [PH] (ref 5–7)
RBC #/AREA URNS AUTO: ABNORMAL /HPF
SP GR UR STRIP: 1.01 (ref 1–1.03)
SQUAMOUS #/AREA URNS AUTO: ABNORMAL /LPF
UROBILINOGEN UR STRIP-ACNC: 0.2 E.U./DL
WBC #/AREA URNS AUTO: >100 /HPF

## 2023-12-20 PROCEDURE — 99214 OFFICE O/P EST MOD 30 MIN: CPT | Mod: 95 | Performed by: NURSE PRACTITIONER

## 2023-12-20 PROCEDURE — 87086 URINE CULTURE/COLONY COUNT: CPT

## 2023-12-20 PROCEDURE — 81001 URINALYSIS AUTO W/SCOPE: CPT

## 2023-12-20 NOTE — PROGRESS NOTES
Rhonda is a 73 year old who is being evaluated via a billable telephone visit.      What phone number would you like to be contacted at? [phone]  How would you like to obtain your AVS? Chelsea    Distant Location (provider location):  On-site    Assessment & Plan     Urinary problem in female  UA RESULTS:  Recent Labs   Lab Test 12/20/23  1219   COLOR Yellow   APPEARANCE Clear   URINEGLC Negative   URINEBILI Negative   URINEKETONE Negative   SG 1.015   UBLD Trace*   URINEPH 6.0   PROTEIN Negative   UROBILINOGEN 0.2   NITRITE Positive*   LEUKEST Large*   RBCU 5-10*   WBCU >100*     - will treat with Bactrim DS  - UA Macroscopic with reflex to Microscopic and Culture - Lab Collect      JODIE Garza [facility]   Rhonda is a 73 year old, presenting for the following health issues:  UTI (Frequency UTI )        12/20/2023     7:52 AM   Additional Questions   Roomed by Sarah Brantley     73 year old  year old female with PMH   Patient Active Problem List   Diagnosis Code    Hypothyroidism E03.9    HYPERLIPIDEMIA LDL GOAL <130 E78.5    Squamous cell carcinoma in situ D09.9    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    Degenerative arthritis of knee M17.9    Aftercare following knee joint replacement surgery, unspecified laterality Z47.1, Z96.659    Anemia due to blood loss, acute D62    Advance care planning Z71.89    Left knee pain M25.562    Other orthopedic aftercare(V54.89) Z47.89    BMI of 40.0-44.9, adult (H) E66.01, Z68.41    Physical deconditioning R53.81    Benign essential hypertension I10    Right knee pain M25.561    Aftercare following right knee joint replacement surgery Z47.1, Z96.651    Osteoporosis, unspecified osteoporosis type, unspecified pathological fracture presence M81.0    Closed compression fracture of thoracic vertebra, initial encounter (H) S22.000A     in clinic for acute office visit related to/establish care/to discuss     UTI hx:   4/2023      >100,000 CFU/mL Escherichia coli Abnormal         10/23    >100,000 CFU/mL Escherichia coli Abnormal         11/2023    >100,000 CFU/mL Escherichia coli Abnormal         11/2023    >100,000 CFU/mL Escherichia coli Abnormal         12/2023 symptomatic; no UTI     10,000-50,000 CFU/mL Mixture of Urogenital Terese              UA RESULTS:  Recent Labs   Lab Test 12/14/23  1340   COLOR Yellow   APPEARANCE Clear   URINEGLC Negative   URINEBILI Negative   URINEKETONE Negative   SG 1.010   UBLD Trace*   URINEPH 6.5   PROTEIN Negative   UROBILINOGEN 0.2   NITRITE Negative   LEUKEST Small*   RBCU 0-2   WBCU 5-10*         History of Present Illness       Reason for visit:  UTI    She eats 4 or more servings of fruits and vegetables daily.She consumes 0 sweetened beverage(s) daily.She exercises with enough effort to increase her heart rate 20 to 29 minutes per day.  She exercises with enough effort to increase her heart rate 4 days per week.   She is taking medications regularly.               Review of Systems         Objective           Vitals:  No vitals were obtained today due to virtual visit.    Physical Exam   healthy, alert, and no distress  PSYCH: Alert and oriented times 3; coherent speech, normal   rate and volume, able to articulate logical thoughts, able   to abstract reason, no tangential thoughts, no hallucinations   or delusions  Her affect is normal  RESP: No cough, no audible wheezing, able to talk in full sentences  Remainder of exam unable to be completed due to telephone visits            Phone call duration: 25 minutes

## 2023-12-21 ENCOUNTER — E-VISIT (OUTPATIENT)
Dept: URGENT CARE | Facility: CLINIC | Age: 73
End: 2023-12-21
Payer: COMMERCIAL

## 2023-12-21 ENCOUNTER — HOSPITAL ENCOUNTER (OUTPATIENT)
Dept: CARDIAC REHAB | Facility: CLINIC | Age: 73
Discharge: HOME | End: 2023-12-21
Attending: STUDENT IN AN ORGANIZED HEALTH CARE EDUCATION/TRAINING PROGRAM
Payer: COMMERCIAL

## 2023-12-21 DIAGNOSIS — N39.0 ACUTE UTI (URINARY TRACT INFECTION): Primary | ICD-10-CM

## 2023-12-21 LAB — BACTERIA UR CULT: NORMAL

## 2023-12-21 PROCEDURE — 99421 OL DIG E/M SVC 5-10 MIN: CPT | Performed by: FAMILY MEDICINE

## 2023-12-21 PROCEDURE — G0239 OTH RESP PROC, GROUP: HCPCS

## 2023-12-21 RX ORDER — SULFAMETHOXAZOLE/TRIMETHOPRIM 800-160 MG
1 TABLET ORAL 2 TIMES DAILY
Qty: 6 TABLET | Refills: 0 | Status: SHIPPED | OUTPATIENT
Start: 2023-12-21 | End: 2023-12-24

## 2023-12-22 NOTE — PATIENT INSTRUCTIONS
Dear Rhonda Trotter    After reviewing your responses, I've been able to diagnose you with a urinary tract infection, which is a common infection of the bladder with bacteria.  This is not a sexually transmitted infection, though urinating immediately after intercourse can help prevent infections.  Drinking lots of fluids is also helpful to clear your current infection and prevent the next one.      I have sent a prescription for antibiotics to your pharmacy to treat this infection.    It is important that you take all of your prescribed medication even if your symptoms are improving after a few doses.  Taking all of your medicine helps prevent the symptoms from returning.     If your symptoms worsen, you develop pain in your back or stomach, develop fevers, or are not improving in 5 days, please contact your primary care provider for an appointment or visit any of our convenient Walk-in or Urgent Care Centers to be seen, which can be found on our website here.    Thanks again for choosing us as your health care partner,    JODIE PANCHAL CNP  Urinary Tract Infection (UTI) in Women: Care Instructions  Overview     A urinary tract infection, or UTI, is a general term for an infection anywhere between the kidneys and the urethra (where urine comes out). Most UTIs are bladder infections. They often cause pain or burning when you urinate.  UTIs are caused by bacteria and can be cured with antibiotics. Be sure to complete your treatment so that the infection does not get worse.  Follow-up care is a key part of your treatment and safety. Be sure to make and go to all appointments, and call your doctor if you are having problems. It's also a good idea to know your test results and keep a list of the medicines you take.  How can you care for yourself at home?  Take your antibiotics as directed. Do not stop taking them just because you feel better. You need to take the full course of antibiotics.  Drink extra water and  "other fluids for the next day or two. This will help make the urine less concentrated and help wash out the bacteria that are causing the infection. (If you have kidney, heart, or liver disease and have to limit fluids, talk with your doctor before you increase the amount of fluids you drink.)  Avoid drinks that are carbonated or have caffeine. They can irritate the bladder.  Urinate often. Try to empty your bladder each time.  To relieve pain, take a hot bath or lay a heating pad set on low over your lower belly or genital area. Never go to sleep with a heating pad in place.  To prevent UTIs  Drink plenty of water each day. This helps you urinate often, which clears bacteria from your system. (If you have kidney, heart, or liver disease and have to limit fluids, talk with your doctor before you increase the amount of fluids you drink.)  Urinate when you need to.  If you are sexually active, urinate right after you have sex.  Change sanitary pads often.  Avoid douches, bubble baths, feminine hygiene sprays, and other feminine hygiene products that have deodorants.  After going to the bathroom, wipe from front to back.  When should you call for help?   Call your doctor now or seek immediate medical care if:    Symptoms such as fever, chills, nausea, or vomiting get worse or appear for the first time.     You have new pain in your back just below your rib cage. This is called flank pain.     There is new blood or pus in your urine.     You have any problems with your antibiotic medicine.   Watch closely for changes in your health, and be sure to contact your doctor if:    You are not getting better after taking an antibiotic for 2 days.     Your symptoms go away but then come back.   Where can you learn more?  Go to https://www.healthwise.net/patiented  Enter K848 in the search box to learn more about \"Urinary Tract Infection (UTI) in Women: Care Instructions.\"  Current as of: February 28, 2023               Content " Version: 13.8 2006-2023 [employer].   Care instructions adapted under license by your healthcare professional. If you have questions about a medical condition or this instruction, always ask your healthcare professional. [employer] disclaims any warranty or liability for your use of this information.

## 2023-12-28 ENCOUNTER — HOSPITAL ENCOUNTER (OUTPATIENT)
Dept: CARDIAC REHAB | Facility: CLINIC | Age: 73
Discharge: HOME | End: 2023-12-28
Attending: STUDENT IN AN ORGANIZED HEALTH CARE EDUCATION/TRAINING PROGRAM
Payer: COMMERCIAL

## 2023-12-28 PROCEDURE — G0239 OTH RESP PROC, GROUP: HCPCS

## 2024-01-02 ENCOUNTER — HOSPITAL ENCOUNTER (OUTPATIENT)
Dept: CARDIAC REHAB | Facility: CLINIC | Age: 74
Discharge: HOME | End: 2024-01-02
Attending: STUDENT IN AN ORGANIZED HEALTH CARE EDUCATION/TRAINING PROGRAM
Payer: COMMERCIAL

## 2024-01-02 PROCEDURE — G0239 OTH RESP PROC, GROUP: HCPCS

## 2024-01-04 ENCOUNTER — HOSPITAL ENCOUNTER (OUTPATIENT)
Dept: CARDIAC REHAB | Facility: CLINIC | Age: 74
Discharge: HOME | End: 2024-01-04
Attending: STUDENT IN AN ORGANIZED HEALTH CARE EDUCATION/TRAINING PROGRAM
Payer: COMMERCIAL

## 2024-01-04 PROCEDURE — G0239 OTH RESP PROC, GROUP: HCPCS | Performed by: CLINICAL EXERCISE PHYSIOLOGIST

## 2024-01-11 ENCOUNTER — HOSPITAL ENCOUNTER (OUTPATIENT)
Dept: CARDIAC REHAB | Facility: CLINIC | Age: 74
Discharge: HOME | End: 2024-01-11
Attending: STUDENT IN AN ORGANIZED HEALTH CARE EDUCATION/TRAINING PROGRAM
Payer: COMMERCIAL

## 2024-01-11 PROCEDURE — G0239 OTH RESP PROC, GROUP: HCPCS

## 2024-01-16 ENCOUNTER — HOSPITAL ENCOUNTER (OUTPATIENT)
Dept: CARDIAC REHAB | Facility: CLINIC | Age: 74
Discharge: HOME | End: 2024-01-16
Attending: STUDENT IN AN ORGANIZED HEALTH CARE EDUCATION/TRAINING PROGRAM
Payer: COMMERCIAL

## 2024-01-16 PROCEDURE — G0239 OTH RESP PROC, GROUP: HCPCS

## 2024-01-18 ENCOUNTER — HOSPITAL ENCOUNTER (OUTPATIENT)
Dept: CARDIAC REHAB | Facility: CLINIC | Age: 74
Discharge: HOME | End: 2024-01-18
Attending: STUDENT IN AN ORGANIZED HEALTH CARE EDUCATION/TRAINING PROGRAM
Payer: COMMERCIAL

## 2024-01-18 PROCEDURE — G0239 OTH RESP PROC, GROUP: HCPCS

## 2024-01-23 ENCOUNTER — HOSPITAL ENCOUNTER (OUTPATIENT)
Dept: CARDIAC REHAB | Facility: CLINIC | Age: 74
Discharge: HOME | End: 2024-01-23
Attending: STUDENT IN AN ORGANIZED HEALTH CARE EDUCATION/TRAINING PROGRAM
Payer: COMMERCIAL

## 2024-01-23 PROCEDURE — G0239 OTH RESP PROC, GROUP: HCPCS | Performed by: REHABILITATION PRACTITIONER

## 2024-01-25 ENCOUNTER — HOSPITAL ENCOUNTER (OUTPATIENT)
Dept: CARDIAC REHAB | Facility: CLINIC | Age: 74
Discharge: HOME | End: 2024-01-25
Attending: STUDENT IN AN ORGANIZED HEALTH CARE EDUCATION/TRAINING PROGRAM
Payer: COMMERCIAL

## 2024-01-25 PROCEDURE — G0239 OTH RESP PROC, GROUP: HCPCS

## 2024-01-25 NOTE — PROGRESS NOTES
"Pulmonary Clinic Note    Date of Service: 1/26/2024     Chief Complaint   Patient presents with    RECHECK     Pulmonary fibrosis       A/P:  73F HTN, HLD, hypothyroidism being seen for follow up pulmonary fibrosis. PFTs stable on today's visit. She has seen symptomatic improvement since starting pulmonary rehab and w/ wt loss.     - continue pulmonary rehab  - continue regular exercise  - continue 2L O2 w/ exertion to keep SpO2 > 88%  - OK to substitute medications based on formulary    RTC 1 year w/ repeat PFTs     History:  73F HTN, HLD, hypothyroidism being seen for follow up pulmonary fibrosis. Last seen 9/2023. She is using 2L exertional O2. She is prescribed prn albuterol. She has been attending pulmonary rehab. Breathing feels OK. Feels pulmonary rehab has been helpful. Occasional KHAN w/ moderate-strenuous activity. Checks SpO2 at home, lowest recently 88%, comes back up with in a minutes. No SOB at rest. AM cough she related to post-nasal gtt. No chest pain or tightness. Did not feel albuterol was very helpful. Using O2 at pulmonary rehab, but not much else otherwise. She has lost 31lbs.     She is up to date on vaccines.     Sister had pulmonary fibrosis and history of CTD.      Smoking: never  Hot tub exposure: no       Bird exposure: Parakeet as a child, otherwise no             Animal exposure: no        Inhalation exposure: no    Occupation: retired, former office work                         10 point review of systems negative, aside from that mentioned in HPI.    /67 (BP Location: Left arm, Patient Position: Sitting, Cuff Size: Adult Regular)   Pulse 60   Resp 16   Ht 1.638 m (5' 4.5\")   LMP 08/01/2004   SpO2 97%   BMI 41.46 kg/m    Gen: well-appearing  HEENT: Mallampati IV  Card: RRR  Pulm: clear bilaterally   Abd: soft  MSK: no edema, no acute joint abnormality   Skin: no obvious rash  Psych: normal affect  Neuro: alert and oriented     Labs:  Personally reviewed  ANCA (8/2023) - 1:40   "   Imaging/Studies: Personally reviewed  PFTs (2024) - moderate restriction, moderate reduction in DLCOunc  6MWT (2023) - hypoxemia w/ ambulation on RA, reduced distance, significant desaturion w/o hypoxemia on 2L   PFTs (2023) - moderate restriction, moderate reduction in DLCOunc  CT Chest (2023) - mild-moderate peripheral and basilar fibrosis    Past Medical History:   Diagnosis Date    Arthritis     Obesity, unspecified     Pure hypercholesterolemia     Unspecified essential hypertension     Unspecified hypothyroidism      Past Surgical History:   Procedure Laterality Date    ARTHROPLASTY KNEE Left 2/3/2016    Procedure: ARTHROPLASTY KNEE;  Surgeon: Abdiel Ledesma MD;  Location:  OR    ARTHROPLASTY KNEE Right 10/11/2016    Procedure: ARTHROPLASTY KNEE;  Surgeon: Abdiel Ledesma MD;  Location:  OR    BIOPSY  2014    breast biopsy was negative    COLONOSCOPY      every 5 years starting at age 52    orif Left     wrist    SURGICAL HISTORY OF -       wisdom teeth extraction     Family History   Problem Relation Age of Onset    C.A.D. Father         MI's x 2    Hypertension Father     Hyperlipidemia Father     Prostate Cancer Father     Cancer - colorectal Mother         diagnosed age 55    Heart Disease Mother         mi    Colon Cancer Mother     Obesity Mother     Circulatory Sister         aortic stenosis; Raynauds    Lipids Sister         on Lipitor    Respiratory Sister         pulmonary fibrosis    Hypertension Sister         lost wt and off bp meds    C.A.D. Brother          of MI    Heart Disease Brother 53        mi    Hypertension Brother     Respiratory Paternal Uncle         Tb    Heart Disease Paternal Uncle     Gynecology Daughter     Hypertension Sister     Hyperlipidemia Sister     Obesity Sister     Hypertension Brother     Diabetes No family hx of     Cerebrovascular Disease No family hx of     Breast Cancer No family hx of      Social History     Socioeconomic  History    Marital status: Single     Spouse name: Not on file    Number of children: Not on file    Years of education: Not on file    Highest education level: Not on file   Occupational History    Not on file   Tobacco Use    Smoking status: Never    Smokeless tobacco: Never   Vaping Use    Vaping Use: Never used   Substance and Sexual Activity    Alcohol use: Yes     Comment: very light use    Drug use: No    Sexual activity: Never     Birth control/protection: None   Other Topics Concern    Parent/sibling w/ CABG, MI or angioplasty before 65F 55M? Yes     Comment: brother  of heart attack at age 53   Social History Narrative    Dairy/d 3-4 servings/d.     Caffeine 0-1 servings/d    Exercise 1-2 x week trying to increase    Sunscreen used - Yes,occ    Seatbelts used - Yes    Working smoke/CO detectors in the home - Yes    Guns stored in the home - No    Self Breast Exams - No    Self Testicular Exam - NA    Eye Exam up to date - Yes     Dental Exam up to date - Yes     Pap Smear up to date - no    Mammogram up to date - has appt today    PSA up to date - NA    Dexa Scan up to date - 3/06    Flex Sig / Colonoscopy up to date - Yes,May 2008,repeat in 5yrs    Immunizations up to date - Yes,Td     Abuse: Current or Past(Physical, Sexual or Emotional)- No    Do you feel safe in your environment - Yes     Social Determinants of Health     Financial Resource Strain: Low Risk  (2023)    Financial Resource Strain     Within the past 12 months, have you or your family members you live with been unable to get utilities (heat, electricity) when it was really needed?: No   Food Insecurity: Low Risk  (2023)    Food Insecurity     Within the past 12 months, did you worry that your food would run out before you got money to buy more?: No     Within the past 12 months, did the food you bought just not last and you didn t have money to get more?: No   Transportation Needs: Low Risk  (2023)     Transportation Needs     Within the past 12 months, has lack of transportation kept you from medical appointments, getting your medicines, non-medical meetings or appointments, work, or from getting things that you need?: No   Physical Activity: Not on file   Stress: Not on file   Social Connections: Not on file   Interpersonal Safety: Low Risk  (12/6/2023)    Interpersonal Safety     Do you feel physically and emotionally safe where you currently live?: Yes     Within the past 12 months, have you been hit, slapped, kicked or otherwise physically hurt by someone?: No     Within the past 12 months, have you been humiliated or emotionally abused in other ways by your partner or ex-partner?: No   Housing Stability: Low Risk  (12/19/2023)    Housing Stability     Do you have housing? : Yes     Are you worried about losing your housing?: No       35 minutes spent reviewing chart, reviewing test results, talking with and examining patient, formulating plan, and documentation on the day of the encounter.    Abdiel Torres MD  Pulmonary and Critical Care Medicine  [facility]

## 2024-01-26 ENCOUNTER — OFFICE VISIT (OUTPATIENT)
Dept: PULMONOLOGY | Facility: CLINIC | Age: 74
End: 2024-01-26
Attending: STUDENT IN AN ORGANIZED HEALTH CARE EDUCATION/TRAINING PROGRAM
Payer: COMMERCIAL

## 2024-01-26 VITALS
RESPIRATION RATE: 16 BRPM | BODY MASS INDEX: 41.46 KG/M2 | SYSTOLIC BLOOD PRESSURE: 123 MMHG | HEART RATE: 60 BPM | OXYGEN SATURATION: 97 % | HEIGHT: 65 IN | DIASTOLIC BLOOD PRESSURE: 67 MMHG

## 2024-01-26 DIAGNOSIS — J84.10 PULMONARY FIBROSIS (H): Primary | ICD-10-CM

## 2024-01-26 DIAGNOSIS — J84.10 PULMONARY FIBROSIS (H): ICD-10-CM

## 2024-01-26 PROCEDURE — 94726 PLETHYSMOGRAPHY LUNG VOLUMES: CPT | Performed by: INTERNAL MEDICINE

## 2024-01-26 PROCEDURE — 94729 DIFFUSING CAPACITY: CPT | Performed by: INTERNAL MEDICINE

## 2024-01-26 PROCEDURE — 99214 OFFICE O/P EST MOD 30 MIN: CPT | Mod: 25 | Performed by: STUDENT IN AN ORGANIZED HEALTH CARE EDUCATION/TRAINING PROGRAM

## 2024-01-26 PROCEDURE — 94375 RESPIRATORY FLOW VOLUME LOOP: CPT | Performed by: INTERNAL MEDICINE

## 2024-01-26 ASSESSMENT — PAIN SCALES - GENERAL: PAINLEVEL: NO PAIN (0)

## 2024-01-26 NOTE — PATIENT INSTRUCTIONS
Your pulmonary function tests are stable. No treatment indicated at this time. Use oxygen to keep your oxygen level > 88%. Continue pulmonary rehab and continue regular exercise after this program completes. I will see you back in 1 year with repeat pulmonary function tests.

## 2024-01-26 NOTE — LETTER
"    1/26/2024         RE: Rhonda Trotter  [address]  [place] [facility] [address]        Dear Colleague,    Thank you for referring your patient, Rhonda Trotter, to the [facility] [place]. Please see a copy of my visit note below.    Pulmonary Clinic Note    Date of Service: 1/26/2024     Chief Complaint   Patient presents with     RECHECK     Pulmonary fibrosis       A/P:  73F HTN, HLD, hypothyroidism being seen for follow up pulmonary fibrosis. PFTs stable on today's visit. She has seen symptomatic improvement since starting pulmonary rehab and w/ wt loss.     - continue pulmonary rehab  - continue regular exercise  - continue 2L O2 w/ exertion to keep SpO2 > 88%  - OK to substitute medications based on formulary    RTC 1 year w/ repeat PFTs     History:  73F HTN, HLD, hypothyroidism being seen for follow up pulmonary fibrosis. Last seen 9/2023. She is using 2L exertional O2. She is prescribed prn albuterol. She has been attending pulmonary rehab. Breathing feels OK. Feels pulmonary rehab has been helpful. Occasional KHAN w/ moderate-strenuous activity. Checks SpO2 at home, lowest recently 88%, comes back up with in a minutes. No SOB at rest. AM cough she related to post-nasal gtt. No chest pain or tightness. Did not feel albuterol was very helpful. Using O2 at pulmonary rehab, but not much else otherwise. She has lost 31lbs.     She is up to date on vaccines.     Sister had pulmonary fibrosis and history of CTD.      Smoking: never  Hot tub exposure: no       Bird exposure: Parakeet as a child, otherwise no             Animal exposure: no        Inhalation exposure: no    Occupation: retired, former office work                         10 point review of systems negative, aside from that mentioned in HPI.    /67 (BP Location: Left arm, Patient Position: Sitting, Cuff Size: Adult Regular)   Pulse 60   Resp 16   Ht 1.638 m (5' 4.5\")   LMP 08/01/2004   SpO2 97%  "  BMI 41.46 kg/m    Gen: well-appearing  HEENT: Mallampati IV  Card: RRR  Pulm: clear bilaterally   Abd: soft  MSK: no edema, no acute joint abnormality   Skin: no obvious rash  Psych: normal affect  Neuro: alert and oriented     Labs:  Personally reviewed  ANCA (2023) - 1:40     Imaging/Studies: Personally reviewed  PFTs (2024) - moderate restriction, moderate reduction in DLCOunc  6MWT (2023) - hypoxemia w/ ambulation on RA, reduced distance, significant desaturion w/o hypoxemia on 2L   PFTs (2023) - moderate restriction, moderate reduction in DLCOunc  CT Chest (2023) - mild-moderate peripheral and basilar fibrosis    Past Medical History:   Diagnosis Date     Arthritis      Obesity, unspecified      Pure hypercholesterolemia      Unspecified essential hypertension      Unspecified hypothyroidism      Past Surgical History:   Procedure Laterality Date     ARTHROPLASTY KNEE Left 2/3/2016    Procedure: ARTHROPLASTY KNEE;  Surgeon: Abdiel Ledesma MD;  Location:  OR     ARTHROPLASTY KNEE Right 10/11/2016    Procedure: ARTHROPLASTY KNEE;  Surgeon: Abdiel Ledesma MD;  Location:  OR     BIOPSY  2014    breast biopsy was negative     COLONOSCOPY  2013    every 5 years starting at age 52     orif Left     wrist     SURGICAL HISTORY OF -       wisdom teeth extraction     Family History   Problem Relation Age of Onset     C.A.D. Father         MI's x 2     Hypertension Father      Hyperlipidemia Father      Prostate Cancer Father      Cancer - colorectal Mother         diagnosed age 55     Heart Disease Mother         mi     Colon Cancer Mother      Obesity Mother      Circulatory Sister         aortic stenosis; Raynauds     Lipids Sister         on Lipitor     Respiratory Sister         pulmonary fibrosis     Hypertension Sister         lost wt and off bp meds     C.A.D. Brother          of MI     Heart Disease Brother 53        mi     Hypertension Brother      Respiratory Paternal Uncle          Tb     Heart Disease Paternal Uncle      Gynecology Daughter      Hypertension Sister      Hyperlipidemia Sister      Obesity Sister      Hypertension Brother      Diabetes No family hx of      Cerebrovascular Disease No family hx of      Breast Cancer No family hx of      Social History     Socioeconomic History     Marital status: Single     Spouse name: Not on file     Number of children: Not on file     Years of education: Not on file     Highest education level: Not on file   Occupational History     Not on file   Tobacco Use     Smoking status: Never     Smokeless tobacco: Never   Vaping Use     Vaping Use: Never used   Substance and Sexual Activity     Alcohol use: Yes     Comment: very light use     Drug use: No     Sexual activity: Never     Birth control/protection: None   Other Topics Concern     Parent/sibling w/ CABG, MI or angioplasty before 65F 55M? Yes     Comment: brother  of heart attack at age 53   Social History Narrative    Dairy/d 3-4 servings/d.     Caffeine 0-1 servings/d    Exercise 1-2 x week trying to increase    Sunscreen used - Yes,occ    Seatbelts used - Yes    Working smoke/CO detectors in the home - Yes    Guns stored in the home - No    Self Breast Exams - No    Self Testicular Exam - NA    Eye Exam up to date - Yes     Dental Exam up to date - Yes     Pap Smear up to date - no    Mammogram up to date - has appt today    PSA up to date - NA    Dexa Scan up to date - 3/06    Flex Sig / Colonoscopy up to date - Yes,May 2008,repeat in 5yrs    Immunizations up to date - Yes,Td     Abuse: Current or Past(Physical, Sexual or Emotional)- No    Do you feel safe in your environment - Yes     Social Determinants of Health     Financial Resource Strain: Low Risk  (2023)    Financial Resource Strain      Within the past 12 months, have you or your family members you live with been unable to get utilities (heat, electricity) when it was really needed?: No   Food Insecurity:  Low Risk  (12/19/2023)    Food Insecurity      Within the past 12 months, did you worry that your food would run out before you got money to buy more?: No      Within the past 12 months, did the food you bought just not last and you didn t have money to get more?: No   Transportation Needs: Low Risk  (12/19/2023)    Transportation Needs      Within the past 12 months, has lack of transportation kept you from medical appointments, getting your medicines, non-medical meetings or appointments, work, or from getting things that you need?: No   Physical Activity: Not on file   Stress: Not on file   Social Connections: Not on file   Interpersonal Safety: Low Risk  (12/6/2023)    Interpersonal Safety      Do you feel physically and emotionally safe where you currently live?: Yes      Within the past 12 months, have you been hit, slapped, kicked or otherwise physically hurt by someone?: No      Within the past 12 months, have you been humiliated or emotionally abused in other ways by your partner or ex-partner?: No   Housing Stability: Low Risk  (12/19/2023)    Housing Stability      Do you have housing? : Yes      Are you worried about losing your housing?: No       35 minutes spent reviewing chart, reviewing test results, talking with and examining patient, formulating plan, and documentation on the day of the encounter.    Abdiel Torres MD  Pulmonary and Critical Care Medicine  [facility]       Again, thank you for allowing me to participate in the care of your patient.        Sincerely,        Abdiel Torres MD

## 2024-01-26 NOTE — PROGRESS NOTES
Rhonda Trotter comes into clinic today at the request of Dr. Torres, Ordering Provider for PFT    This service provided today was under the supervising provider of the day Dr. Torres, who was available if needed.    Mikey Ruiz, RT

## 2024-01-26 NOTE — LETTER
1/26/2024         RE: Rhonda Trotter  [address]  [place] [address]        Dear Colleague,    Thank you for referring your patient, Rhonda Trotter, to the [facility] [place]. Please see a copy of my visit note below.    Rhonda Trotter comes into clinic today at the request of Dr. Torres, Ordering Provider for PFT    This service provided today was under the supervising provider of the day Dr. Torres, who was available if needed.    Mikey Ruiz, RT       Again, thank you for allowing me to participate in the care of your patient.        Sincerely,        [facility]

## 2024-01-26 NOTE — NURSING NOTE
"Chief Complaint   Patient presents with    RECHECK     Pulmonary fibrosis       Vitals:    01/26/24 0952   BP: 123/67   BP Location: Left arm   Patient Position: Sitting   Cuff Size: Adult Regular   Pulse: 60   Resp: 16   SpO2: 97%   Height: 1.638 m (5' 4.5\")       Body mass index is 41.46 kg/m .    Andree Rain, ICVF  "

## 2024-01-28 LAB
DLCOUNC-%PRED-PRE: 59 %
DLCOUNC-PRE: 11.8 ML/MIN/MMHG
DLCOUNC-PRED: 19.84 ML/MIN/MMHG
ERV-%PRED-PRE: 22 %
ERV-PRE: 0.24 L
ERV-PRED: 1.05 L
EXPTIME-PRE: 3.47 SEC
FEF2575-%PRED-PRE: 174 %
FEF2575-PRE: 3.14 L/SEC
FEF2575-PRED: 1.8 L/SEC
FEFMAX-%PRED-PRE: 100 %
FEFMAX-PRE: 5.9 L/SEC
FEFMAX-PRED: 5.84 L/SEC
FEV1-%PRED-PRE: 85 %
FEV1-PRE: 1.84 L
FEV1FEV6-PRE: 90 %
FEV1FEV6-PRED: 79 %
FEV1FVC-PRE: 91 %
FEV1FVC-PRED: 79 %
FEV1SVC-PRE: 92 %
FEV1SVC-PRED: 65 %
FIFMAX-PRE: 5.01 L/SEC
FRCPLETH-%PRED-PRE: 62 %
FRCPLETH-PRE: 1.77 L
FRCPLETH-PRED: 2.83 L
FVC-%PRED-PRE: 73 %
FVC-PRE: 2.03 L
FVC-PRED: 2.76 L
IC-%PRED-PRE: 83 %
IC-PRE: 1.75 L
IC-PRED: 2.11 L
RVPLETH-%PRED-PRE: 70 %
RVPLETH-PRE: 1.53 L
RVPLETH-PRED: 2.18 L
TLCPLETH-%PRED-PRE: 66 %
TLCPLETH-PRE: 3.53 L
TLCPLETH-PRED: 5.27 L
VA-%PRED-PRE: 64 %
VA-PRE: 3.14 L
VC-%PRED-PRE: 60 %
VC-PRE: 2 L
VC-PRED: 3.29 L

## 2024-01-30 ENCOUNTER — HOSPITAL ENCOUNTER (OUTPATIENT)
Dept: CARDIAC REHAB | Facility: CLINIC | Age: 74
Discharge: HOME | End: 2024-01-30
Attending: STUDENT IN AN ORGANIZED HEALTH CARE EDUCATION/TRAINING PROGRAM
Payer: COMMERCIAL

## 2024-01-30 PROCEDURE — G0239 OTH RESP PROC, GROUP: HCPCS

## 2024-02-01 ENCOUNTER — HOSPITAL ENCOUNTER (OUTPATIENT)
Dept: CARDIAC REHAB | Facility: CLINIC | Age: 74
Discharge: HOME | End: 2024-02-01
Attending: STUDENT IN AN ORGANIZED HEALTH CARE EDUCATION/TRAINING PROGRAM
Payer: COMMERCIAL

## 2024-02-01 PROCEDURE — G0239 OTH RESP PROC, GROUP: HCPCS

## 2024-02-02 ENCOUNTER — OFFICE VISIT (OUTPATIENT)
Dept: UROLOGY | Facility: CLINIC | Age: 74
End: 2024-02-02
Attending: NURSE PRACTITIONER
Payer: COMMERCIAL

## 2024-02-02 VITALS
WEIGHT: 231 LBS | SYSTOLIC BLOOD PRESSURE: 120 MMHG | DIASTOLIC BLOOD PRESSURE: 78 MMHG | OXYGEN SATURATION: 97 % | BODY MASS INDEX: 37.12 KG/M2 | HEART RATE: 61 BPM | HEIGHT: 66 IN

## 2024-02-02 DIAGNOSIS — K59.00 CONSTIPATION, UNSPECIFIED CONSTIPATION TYPE: ICD-10-CM

## 2024-02-02 DIAGNOSIS — N39.46 MIXED INCONTINENCE: ICD-10-CM

## 2024-02-02 DIAGNOSIS — R39.15 URINARY URGENCY: ICD-10-CM

## 2024-02-02 DIAGNOSIS — M62.89 PELVIC FLOOR DYSFUNCTION: ICD-10-CM

## 2024-02-02 DIAGNOSIS — N81.6 RECTOCELE: ICD-10-CM

## 2024-02-02 DIAGNOSIS — N90.89 VULVAR IRRITATION: ICD-10-CM

## 2024-02-02 DIAGNOSIS — N39.0 RECURRENT UTI: Primary | ICD-10-CM

## 2024-02-02 DIAGNOSIS — N81.11 CYSTOCELE, MIDLINE: ICD-10-CM

## 2024-02-02 LAB
ALBUMIN UR-MCNC: NEGATIVE MG/DL
APPEARANCE UR: CLEAR
BILIRUB UR QL STRIP: NEGATIVE
COLOR UR AUTO: YELLOW
GLUCOSE UR STRIP-MCNC: NEGATIVE MG/DL
HGB UR QL STRIP: ABNORMAL
KETONES UR STRIP-MCNC: NEGATIVE MG/DL
LEUKOCYTE ESTERASE UR QL STRIP: ABNORMAL
NITRATE UR QL: NEGATIVE
PH UR STRIP: 7 [PH] (ref 5–7)
RESIDUAL VOLUME (RV) (EXTERNAL): 59
SP GR UR STRIP: 1.01 (ref 1–1.03)
UROBILINOGEN UR STRIP-ACNC: 0.2 E.U./DL

## 2024-02-02 PROCEDURE — 87086 URINE CULTURE/COLONY COUNT: CPT | Performed by: PHYSICIAN ASSISTANT

## 2024-02-02 PROCEDURE — 81003 URINALYSIS AUTO W/O SCOPE: CPT | Mod: QW | Performed by: PHYSICIAN ASSISTANT

## 2024-02-02 PROCEDURE — 99204 OFFICE O/P NEW MOD 45 MIN: CPT | Mod: 25 | Performed by: PHYSICIAN ASSISTANT

## 2024-02-02 PROCEDURE — 51798 US URINE CAPACITY MEASURE: CPT | Performed by: PHYSICIAN ASSISTANT

## 2024-02-02 RX ORDER — CLOBETASOL PROPIONATE 0.5 MG/G
OINTMENT TOPICAL
Qty: 60 G | Refills: 3 | Status: SHIPPED | OUTPATIENT
Start: 2024-02-05 | End: 2024-10-02

## 2024-02-02 RX ORDER — ESTRADIOL 0.1 MG/G
2 CREAM VAGINAL
Qty: 40 G | Refills: 3 | Status: SHIPPED | OUTPATIENT
Start: 2024-02-05 | End: 2024-10-02

## 2024-02-02 ASSESSMENT — PAIN SCALES - GENERAL: PAINLEVEL: NO PAIN (0)

## 2024-02-02 NOTE — PATIENT INSTRUCTIONS
- Start clobetasol steroid ointment twice weekly along vulva and vaginal opening at night.     - Start estrogen cream 2/week at night. Apply blueberry sized amount on finger and rub along vaginal tissue like a face cream. Call clinic if medication is too expensive. You can also try using a Good Rx card to see if cheaper than insurance coverage.     - Start citrucel daily and miralax daily. Continue magnesium.   _______________________________________________     UTI Prevention:    - Recommend cranberry supplement 1gm twice-daily or Vitamin C 1 gm twice daily.   - AZO as needed; if symptoms do not improve after 24-48 hours after taking AZO as needed advise contacting clinic.   - Probiotic daily; recommend for Florajen 3   - Make sure you stay hydrated with about 60-80oz of water per day.   - Void after sexual intercourse.   - Recommend good vulvar hygiene such as wearing loose cotton underwear and avoiding scented hygenic products/wipes/soaps or detergents. Wipe front to back after voiding/defecation. Avoid sitting in soiled clothing and keeping vulva dry.   - If you develop symptoms of UTI, please contact clinic. However, if you develop fevers  greater than 100.4 degrees fahrenheit, flank pain or blood in your urine, recommend going to urgent care or ER.   - Make sure to drink plenty of water each day and to really push fluids when have symptoms of a UTI.  - Estrogen cream 2x per week at night; apply blueberry-sized amount on finger and rub along vaginal tissue.   - Can also try D-mannose 2gm daily.   _______________________________________________     Below is a list of things that can irritate the bladder and should be eliminated:  Caffeinated soft drinks.  Coffee.  Tea.  Chocolate.  Tomato-based foods.  Acidic juices and fruits. (includes cranberry juice)  Alcohol.  Nicotine  Carbonated drinks.  Aspartame/Nutrasweet.    ________________________________     - CT scan: Please call [phone] to schedule this at  the [facility] ([place]) or [facility] ([place]).      CYSTOSCOPY    What is a Cystoscopy?  This is a procedure done to check for problems inside the bladder.  Problems may include polyps (growths), tumors, inflammation (swelling and redness) and other concerns.    The doctor inserts a thin tube (called a cystoscope) into the bladder.  The tube is about the size of a pencil.  We will give you numbing medicine to reduce the pain or discomfort you may feel.    The tube allows the doctor to:  The doctor will be able to see inside the bladder by filling the bladder with water.  The water makes it easier to see any problems that may be present.    If needed, the doctor may use the tube to:  The doctor is able to take tissue samples (biopsies).  Samples are sent to the lab for testing.  The doctor can also burn off any small growths or tumors that are found.  This is call fulguration.    What happens after the exam?  You may go back to your normal diet and activity as you feel ready, unless your doctor tells you not to.    For the next two days, you may notice:  Some blood in your urine.  Some burning when you urinate (use the toilet).  An urge to urinate more often.  Bladder spasms.    These are normal after the procedure. They should go away on their own after a day or two.      You can help to relieve the above listed symptoms by:  Drinking 6 to 8 large glasses of water each day (includes drinks at meals).  This will help clear the urine.  Take warm baths to relieve pain and bladder spasms.  Do not add anything to the bath water.  Your doctor may prescribe pain medicine.  You may also take Tylenol (acetaminophen) for pain.    When should I call my doctor?  A fever over 100.0 F (38 C) for more than a day.  (Before you call the doctor, check your temperature under your tongue.)  Chills.  Failure to urinate: No urine comes out when you try to use the toilet.  (Try soaking in  a bathtub full of warm water.  If still no urine, call your doctor.)  A lot of blood in the urine or blood clots larger than a nickel.  Pain in the back or abdomen (belly / stomach area).  Pain or spasms that are not relieved by warm tub baths and pain medicine.  Severe pain, burning or other problems while passing urine.  Pain that gets worse after two days.

## 2024-02-02 NOTE — NURSING NOTE
Chief Complaint   Patient presents with    Recurrent UTis    Patients PVR was 59 ml today.  Corina Baltazar LPN

## 2024-02-02 NOTE — LETTER
2/2/2024       RE: Rhonda Trotter  [address]  [place] [address]     Dear Colleague,    Thank you for referring your patient, Rhonda Trotter, to the [facility] at [facility]. Please see a copy of my visit note below.    Urology Clinic    Name: Rhonda Trotter    MRN: [id number]   YOB: 1950  Accompanied at today's visit by:self              Assessment and Plan:   73 year old female with Demarco, vaginal atrophy, concern for lichen sclerosus based on hx and exam findings, LO, urinary urgency, constipation, pelvic floor dysfunction, cystocele, rectocele    - PVR WNL   - UA shows trace blood and WBC; will send for culture.  - avoid bladder irritants  - discussed OTC supplements and UTI prevention.  - Discussed risks/benefits and potential side effects of estrogen cream; start 2x/week at night.  - discussed good vulvar hygiene   - Order CT urogram and followup after for cysto with Dr. Shepherd. Will need UA/UC prior to cysto.   - Discussed risks/benefits and potential side effects of clobetasol ointment 2x/week at night along vulva.  - start citrucel daily and mirlax daily.   - avoid bladder irritants  - declines referral to PFPT.  - Asymptomatic prolapse; will continue to monitor.  - Consider OAB medication in the future if symptoms persist.     Orders Placed This Encounter   Procedures    MEASURE POST-VOID RESIDUAL URINE/BLADDER CAPACITY, US NON-IMAGING (93666)    UA without Microscopic [[id number]]       After discussing the assessment and plan with patient, patient verbalized understanding and agreed to the above plan. All questions answered.     40 minutes were spent today on the date of the encounter in reviewing the EMR, direct patient care, coordination of care and documentation in addition to exam, ordering medications, ordering CT urogram, reviewing labs.     Esther Quintana PA-C  February 2,  2024    Patient Care Team:  Stephen Lara NP as PCP - General  Brian, Abdiel Huber MD as Assigned Pulmonology Provider  Abdiel Torres MD as MD (Critical Care)  Stephen Lara NP as Assigned PCP  , JETT Hurt as Cardiac Rehabilitation Therapist  Esther Quintana PA-C as Physician Assistant  STEPHEN LARA          Chief Complaint:   UTIs          History of Present Illness:   February 2, 2024    HISTORY: Rhonda Trotter is a 73 year old female as a new consultation for concerns of UTIs. Typical UTI symptoms include increased feeling of urinary urgency but unable to void much when goes. No other UTI symptoms. Per EMR has had 4 UTIs over the past 12 months (all E. Coli) and 2 Ucs showing mixed jeramy. No recent imaging. Denies gross hematuria or kidney stones. When not having UTI symptoms reports reports voiding 4-6x/day and 1-2x/night. Lightly snores at night.  Describes UUI and rare JESUSITA. Going through 1-2 pads/day.  Feels like they empty completely after voiding. Drinks coffee occasionally. Denies prolapse symptoms. Not sexually active. Reports vulvar itching/irritation at times. No abnormal pap smears in remote past per EMR; last pap smear in 2016. Describes constipation. Takes magnesium nightly. PMH is significant for Osteoporosis, HTN, SCC, HLD, hypothyroidism. PSH includes knee surgery, breast biopsy, wrist surgery, dental work. Denies any other  surgeries. Patient voices no other concerns at this time.            Past Medical History:     Past Medical History:   Diagnosis Date    Arthritis     Mumps     Obesity, unspecified     Pure hypercholesterolemia     Unspecified essential hypertension     Unspecified hypothyroidism             Past Surgical History:     Past Surgical History:   Procedure Laterality Date    ARTHROPLASTY KNEE Left 02/03/2016    Procedure: ARTHROPLASTY KNEE;  Surgeon: Abdiel Ledesma MD;  Location: [facility] OR    ARTHROPLASTY KNEE Right 10/11/2016     Procedure: ARTHROPLASTY KNEE;  Surgeon: Abdiel Ledesma MD;  Location: [facility] OR    BIOPSY  2014    breast biopsy was negative    COLONOSCOPY  2013    every 5 years starting at age 52    orif Left     wrist    SURGICAL HISTORY OF -       wisdom teeth extraction            Social History:     Social History     Tobacco Use    Smoking status: Never    Smokeless tobacco: Never   Substance Use Topics    Alcohol use: Yes     Comment: very light use            Family History:     Family History   Problem Relation Age of Onset    C.A.D. Father         MI's x 2    Hypertension Father     Hyperlipidemia Father     Prostate Cancer Father     Cancer - colorectal Mother         diagnosed age 55    Heart Disease Mother         mi    Colon Cancer Mother     Obesity Mother     Circulatory Sister         aortic stenosis; Raynauds    Lipids Sister         on Lipitor    Respiratory Sister         pulmonary fibrosis    Hypertension Sister         lost wt and off bp meds    C.A.D. Brother          of MI    Heart Disease Brother 53        mi    Hypertension Brother     Respiratory Paternal Uncle         Tb    Heart Disease Paternal Uncle     Gynecology Daughter     Hypertension Sister     Hyperlipidemia Sister     Obesity Sister     Hypertension Brother     Diabetes No family hx of     Cerebrovascular Disease No family hx of     Breast Cancer No family hx of             Allergies:     Allergies   Allergen Reactions    No Known Drug Allergy             Medications:     Current Outpatient Medications   Medication Sig    albuterol (PROAIR HFA/PROVENTIL HFA/VENTOLIN HFA) 108 (90 Base) MCG/ACT inhaler Inhale 2 puffs into the lungs every 6 hours as needed for shortness of breath, wheezing or cough    alendronate (FOSAMAX) 70 MG tablet TAKE 1 TABLET EVERY 7 DAYS WITH 8 OUNCES OF WATER 30 MINUTES BEFORE BREAKFAST AND REMAIN UPRIGHT DURING THIS TIME    Calcium-Phosphorus-Vitamin D (CALCIUM GUMMIES) 250-100-500 MG-MG-UNIT CHEW Take 2  "tablets by mouth daily as needed    Cobalamine Combinations (B-12) 100-5000 MCG SUBL Place 1 tablet under the tongue daily as needed    diphenhydrAMINE (BENADRYL) 25 MG capsule Take 1 capsule (25 mg) by mouth nightly as needed for sleep    fluticasone (FLONASE) 50 MCG/ACT spray USE 2 SPRAYS IN EACH NOSTRIL DAILY    levothyroxine (SYNTHROID/LEVOTHROID) 100 MCG tablet Take 1 tablet (100 mcg) by mouth daily    MAGNESIUM PO Take by mouth daily Magnesium 7    multivitamin, therapeutic with minerals (THERA-VIT-M) TABS Take 1 tablet by mouth daily    simvastatin (ZOCOR) 20 MG tablet Take 1 tablet (20 mg) by mouth at bedtime    valsartan (DIOVAN) 160 MG tablet Take 1 tablet (160 mg) by mouth daily    calcium carbonate (TUMS) 500 MG chewable tablet Take 1-2 chew tab by mouth daily as needed for heartburn (Patient not taking: Reported on 1/26/2024)     No current facility-administered medications for this visit.             Review of Systems:    ROS: 14 point ROS neg other than the symptoms noted above in the HPI.          Physical Exam:   Blood pressure 120/78, pulse 61, height 1.664 m (5' 5.5\"), weight 104.8 kg (231 lb), last menstrual period 08/01/2004, SpO2 97%, not currently breastfeeding.  5' 5.5\", Body mass index is 37.86 kg/m ., 231 lbs 0 oz  Gen appearance: Age-appropriate appearing female in NAD.   HEENT:  EOMI, conjunctiva clear/white. Normal ROM of neck for age.   Psych:  alert , In no acute distress.  Neuro:  A&Ox3  Skin:  Clear of obvious rashes, ecchymoses.  Resp:  Normal respiratory effort; not in acute respiratory distress.   Vasc:  Regular rate.  lymph:  No obvious LE edema bilaterally.     exam:  Agglutination of clitoral augustin and posterior labia minora bilaterally. Irritation along introitus; no ulcers, lesions, bleeding, drainage, pain to palpation, abnormal discharge along external genitalia or introitus. Urethral prolapse noted.. Negative for JESUSITA with reduction of speculum when instructed to cough. " Vaginal mucosa atrophic. No pain to palpation on internal exam. Cystocele grade 1. Rectocele grade 2.. Large amount of stool noted inside colon/rectum; no communication between colon/rectum and vagina. Cervix unremarkable. Strength 2/5. No obvious masses, lesions, ulcers, bleeding noted on internal or external exam.          Data:    PVR  59mL    Labs:  UA RESULTS:  Recent Labs   Lab Test 02/02/24  0856 12/20/23  1219   COLOR Yellow Yellow   APPEARANCE Clear Clear   URINEGLC Negative Negative   URINEBILI Negative Negative   URINEKETONE Negative Negative   SG 1.015 1.015   UBLD Trace* Trace*   URINEPH 7.0 6.0   PROTEIN Negative Negative   UROBILINOGEN 0.2 0.2   NITRITE Negative Positive*   LEUKEST Small* Large*   RBCU  --  5-10*   WBCU  --  >100*     UC  12/20/23 >100,000 mixed jeramy  12/14/23 10,000-50,000 mixed jeramy  11/29/23 10,000-50,000 E. Coli (resistant to ampicilllin)  11/4/23 >100,000 E. Coli (resistant to ampicillin)  10/13/23 10,000-50,000 E. Coli (resistant to ampicilllin)  4/17/23 >100,000 E. Coli (resistant to ampicillin)    Creatinine   Date Value Ref Range Status   12/14/2023 0.90 0.52 - 1.04 mg/dL Final   11/09/2020 0.79 0.52 - 1.04 mg/dL Final     UA RESULTS:  Recent Labs   Lab Test 12/20/23  1219   COLOR Yellow   APPEARANCE Clear   URINEGLC Negative   URINEBILI Negative   URINEKETONE Negative   SG 1.015   UBLD Trace*   URINEPH 6.0   PROTEIN Negative   UROBILINOGEN 0.2   NITRITE Positive*   LEUKEST Large*   RBCU 5-10*   WBCU >100*       Office Visit on 01/26/2024   Component Date Value Ref Range Status    FVC-Pred 01/26/2024 2.76  L Final    FVC-Pre 01/26/2024 2.03  L Final    FVC-%Pred-Pre 01/26/2024 73  % Final    FEV1-Pre 01/26/2024 1.84  L Final    FEV1-%Pred-Pre 01/26/2024 85  % Final    FEV1FVC-Pred 01/26/2024 79  % Final    FEV1FVC-Pre 01/26/2024 91  % Final    FEFMax-Pred 01/26/2024 5.84  L/sec Final    FEFMax-Pre 01/26/2024 5.90  L/sec Final    FEFMax-%Pred-Pre 01/26/2024 100  % Final     FEF2575-Pred 01/26/2024 1.80  L/sec Final    FEF2575-Pre 01/26/2024 3.14  L/sec Final    AOE9323-%Pred-Pre 01/26/2024 174  % Final    ExpTime-Pre 01/26/2024 3.47  sec Final    FIFMax-Pre 01/26/2024 5.01  L/sec Final    VC-Pred 01/26/2024 3.29  L Final    VC-Pre 01/26/2024 2.00  L Final    VC-%Pred-Pre 01/26/2024 60  % Final    IC-Pred 01/26/2024 2.11  L Final    IC-Pre 01/26/2024 1.75  L Final    IC-%Pred-Pre 01/26/2024 83  % Final    ERV-Pred 01/26/2024 1.05  L Final    ERV-Pre 01/26/2024 0.24  L Final    ERV-%Pred-Pre 01/26/2024 22  % Final    FEV1FEV6-Pred 01/26/2024 79  % Final    FEV1FEV6-Pre 01/26/2024 90  % Final    FRCPleth-Pred 01/26/2024 2.83  L Final    FRCPleth-Pre 01/26/2024 1.77  L Final    FRCPleth-%Pred-Pre 01/26/2024 62  % Final    RVPleth-Pred 01/26/2024 2.18  L Final    RVPleth-Pre 01/26/2024 1.53  L Final    RVPleth-%Pred-Pre 01/26/2024 70  % Final    TLCPleth-Pred 01/26/2024 5.27  L Final    TLCPleth-Pre 01/26/2024 3.53  L Final    TLCPleth-%Pred-Pre 01/26/2024 66  % Final    DLCOunc-Pred 01/26/2024 19.84  ml/min/mmHg Final    DLCOunc-Pre 01/26/2024 11.80  ml/min/mmHg Final    DLCOunc-%Pred-Pre 01/26/2024 59  % Final    VA-Pre 01/26/2024 3.14  L Final    VA-%Pred-Pre 01/26/2024 64  % Final    FEV1SVC-Pred 01/26/2024 65  % Final    FEV1SVC-Pre 01/26/2024 92  % Final            DEIRDRE YODER PA-C

## 2024-02-02 NOTE — PROGRESS NOTES
Urology Clinic    Name: Rhonda Trotter    MRN: [id number]   YOB: 1950  Accompanied at today's visit by:self              Assessment and Plan:   73 year old female with Demarco, vaginal atrophy, concern for lichen sclerosus based on hx and exam findings, LO, urinary urgency, constipation, pelvic floor dysfunction, cystocele, rectocele    - PVR WNL   - UA shows trace blood and WBC; will send for culture.  - avoid bladder irritants  - discussed OTC supplements and UTI prevention.  - Discussed risks/benefits and potential side effects of estrogen cream; start 2x/week at night.  - discussed good vulvar hygiene   - Order CT urogram and followup after for cysto with Dr. Shepherd. Will need UA/UC prior to cysto.   - Discussed risks/benefits and potential side effects of clobetasol ointment 2x/week at night along vulva.  - start citrucel daily and mirlax daily.   - avoid bladder irritants  - declines referral to PFPT.  - Asymptomatic prolapse; will continue to monitor.  - Consider OAB medication in the future if symptoms persist.     Orders Placed This Encounter   Procedures    MEASURE POST-VOID RESIDUAL URINE/BLADDER CAPACITY, US NON-IMAGING (94127)    UA without Microscopic [[id number]]       After discussing the assessment and plan with patient, patient verbalized understanding and agreed to the above plan. All questions answered.     40 minutes were spent today on the date of the encounter in reviewing the EMR, direct patient care, coordination of care and documentation in addition to exam, ordering medications, ordering CT urogram, reviewing labs.     Esther Quintana PA-C  February 2, 2024    Patient Care Team:  Corina Lara NP as PCP - General  Abdiel Torres MD as Assigned Pulmonology Provider  Abdiel Torres MD as MD (Critical Care)  Corina Lara NP as Assigned PCP  , JETT Hurt as Cardiac Rehabilitation Therapist  Esther Quintana PA-C as Physician  Assistant  STEPHEN MASON          Chief Complaint:   UTIs          History of Present Illness:   February 2, 2024    HISTORY: Rhonda Trotter is a 73 year old female as a new consultation for concerns of UTIs. Typical UTI symptoms include increased feeling of urinary urgency but unable to void much when goes. No other UTI symptoms. Per EMR has had 4 UTIs over the past 12 months (all E. Coli) and 2 Ucs showing mixed jeramy. No recent imaging. Denies gross hematuria or kidney stones. When not having UTI symptoms reports reports voiding 4-6x/day and 1-2x/night. Lightly snores at night.  Describes UUI and rare JESUSITA. Going through 1-2 pads/day.  Feels like they empty completely after voiding. Drinks coffee occasionally. Denies prolapse symptoms. Not sexually active. Reports vulvar itching/irritation at times. No abnormal pap smears in remote past per EMR; last pap smear in 2016. Describes constipation. Takes magnesium nightly. PMH is significant for Osteoporosis, HTN, SCC, HLD, hypothyroidism. PSH includes knee surgery, breast biopsy, wrist surgery, dental work. Denies any other  surgeries. Patient voices no other concerns at this time.            Past Medical History:     Past Medical History:   Diagnosis Date    Arthritis     Mumps     Obesity, unspecified     Pure hypercholesterolemia     Unspecified essential hypertension     Unspecified hypothyroidism             Past Surgical History:     Past Surgical History:   Procedure Laterality Date    ARTHROPLASTY KNEE Left 02/03/2016    Procedure: ARTHROPLASTY KNEE;  Surgeon: Abdiel Ledesma MD;  Location:  OR    ARTHROPLASTY KNEE Right 10/11/2016    Procedure: ARTHROPLASTY KNEE;  Surgeon: Abdiel Ledesma MD;  Location:  OR    BIOPSY  08/2014    breast biopsy was negative    COLONOSCOPY  2013    every 5 years starting at age 52    orif Left     wrist    SURGICAL HISTORY OF -       wisdom teeth extraction            Social History:     Social History      Tobacco Use    Smoking status: Never    Smokeless tobacco: Never   Substance Use Topics    Alcohol use: Yes     Comment: very light use            Family History:     Family History   Problem Relation Age of Onset    C.A.D. Father         MI's x 2    Hypertension Father     Hyperlipidemia Father     Prostate Cancer Father     Cancer - colorectal Mother         diagnosed age 55    Heart Disease Mother         mi    Colon Cancer Mother     Obesity Mother     Circulatory Sister         aortic stenosis; Raynauds    Lipids Sister         on Lipitor    Respiratory Sister         pulmonary fibrosis    Hypertension Sister         lost wt and off bp meds    C.A.D. Brother          of MI    Heart Disease Brother 53        mi    Hypertension Brother     Respiratory Paternal Uncle         Tb    Heart Disease Paternal Uncle     Gynecology Daughter     Hypertension Sister     Hyperlipidemia Sister     Obesity Sister     Hypertension Brother     Diabetes No family hx of     Cerebrovascular Disease No family hx of     Breast Cancer No family hx of             Allergies:     Allergies   Allergen Reactions    No Known Drug Allergy             Medications:     Current Outpatient Medications   Medication Sig    albuterol (PROAIR HFA/PROVENTIL HFA/VENTOLIN HFA) 108 (90 Base) MCG/ACT inhaler Inhale 2 puffs into the lungs every 6 hours as needed for shortness of breath, wheezing or cough    alendronate (FOSAMAX) 70 MG tablet TAKE 1 TABLET EVERY 7 DAYS WITH 8 OUNCES OF WATER 30 MINUTES BEFORE BREAKFAST AND REMAIN UPRIGHT DURING THIS TIME    Calcium-Phosphorus-Vitamin D (CALCIUM GUMMIES) 250-100-500 MG-MG-UNIT CHEW Take 2 tablets by mouth daily as needed    Cobalamine Combinations (B-12) 100-5000 MCG SUBL Place 1 tablet under the tongue daily as needed    diphenhydrAMINE (BENADRYL) 25 MG capsule Take 1 capsule (25 mg) by mouth nightly as needed for sleep    fluticasone (FLONASE) 50 MCG/ACT spray USE 2 SPRAYS IN EACH NOSTRIL DAILY     "levothyroxine (SYNTHROID/LEVOTHROID) 100 MCG tablet Take 1 tablet (100 mcg) by mouth daily    MAGNESIUM PO Take by mouth daily Magnesium 7    multivitamin, therapeutic with minerals (THERA-VIT-M) TABS Take 1 tablet by mouth daily    simvastatin (ZOCOR) 20 MG tablet Take 1 tablet (20 mg) by mouth at bedtime    valsartan (DIOVAN) 160 MG tablet Take 1 tablet (160 mg) by mouth daily    calcium carbonate (TUMS) 500 MG chewable tablet Take 1-2 chew tab by mouth daily as needed for heartburn (Patient not taking: Reported on 1/26/2024)     No current facility-administered medications for this visit.             Review of Systems:    ROS: 14 point ROS neg other than the symptoms noted above in the HPI.          Physical Exam:   Blood pressure 120/78, pulse 61, height 1.664 m (5' 5.5\"), weight 104.8 kg (231 lb), last menstrual period 08/01/2004, SpO2 97%, not currently breastfeeding.  5' 5.5\", Body mass index is 37.86 kg/m ., 231 lbs 0 oz  Gen appearance: Age-appropriate appearing female in NAD.   HEENT:  EOMI, conjunctiva clear/white. Normal ROM of neck for age.   Psych:  alert , In no acute distress.  Neuro:  A&Ox3  Skin:  Clear of obvious rashes, ecchymoses.  Resp:  Normal respiratory effort; not in acute respiratory distress.   Vasc:  Regular rate.  lymph:  No obvious LE edema bilaterally.     exam:  Agglutination of clitoral augustin and posterior labia minora bilaterally. Irritation along introitus; no ulcers, lesions, bleeding, drainage, pain to palpation, abnormal discharge along external genitalia or introitus. Urethral prolapse noted.. Negative for JESUSITA with reduction of speculum when instructed to cough. Vaginal mucosa atrophic. No pain to palpation on internal exam. Cystocele grade 1. Rectocele grade 2.. Large amount of stool noted inside colon/rectum; no communication between colon/rectum and vagina. Cervix unremarkable. Strength 2/5. No obvious masses, lesions, ulcers, bleeding noted on internal or external exam. "          Data:    PVR  59mL    Labs:  UA RESULTS:  Recent Labs   Lab Test 02/02/24  0856 12/20/23  1219   COLOR Yellow Yellow   APPEARANCE Clear Clear   URINEGLC Negative Negative   URINEBILI Negative Negative   URINEKETONE Negative Negative   SG 1.015 1.015   UBLD Trace* Trace*   URINEPH 7.0 6.0   PROTEIN Negative Negative   UROBILINOGEN 0.2 0.2   NITRITE Negative Positive*   LEUKEST Small* Large*   RBCU  --  5-10*   WBCU  --  >100*         UC  12/20/23 >100,000 mixed jeramy  12/14/23 10,000-50,000 mixed jeramy  11/29/23 10,000-50,000 E. Coli (resistant to ampicilllin)  11/4/23 >100,000 E. Coli (resistant to ampicillin)  10/13/23 10,000-50,000 E. Coli (resistant to ampicilllin)  4/17/23 >100,000 E. Coli (resistant to ampicillin)      Creatinine   Date Value Ref Range Status   12/14/2023 0.90 0.52 - 1.04 mg/dL Final   11/09/2020 0.79 0.52 - 1.04 mg/dL Final       UA RESULTS:  Recent Labs   Lab Test 12/20/23  1219   COLOR Yellow   APPEARANCE Clear   URINEGLC Negative   URINEBILI Negative   URINEKETONE Negative   SG 1.015   UBLD Trace*   URINEPH 6.0   PROTEIN Negative   UROBILINOGEN 0.2   NITRITE Positive*   LEUKEST Large*   RBCU 5-10*   WBCU >100*       Office Visit on 01/26/2024   Component Date Value Ref Range Status    FVC-Pred 01/26/2024 2.76  L Final    FVC-Pre 01/26/2024 2.03  L Final    FVC-%Pred-Pre 01/26/2024 73  % Final    FEV1-Pre 01/26/2024 1.84  L Final    FEV1-%Pred-Pre 01/26/2024 85  % Final    FEV1FVC-Pred 01/26/2024 79  % Final    FEV1FVC-Pre 01/26/2024 91  % Final    FEFMax-Pred 01/26/2024 5.84  L/sec Final    FEFMax-Pre 01/26/2024 5.90  L/sec Final    FEFMax-%Pred-Pre 01/26/2024 100  % Final    FEF2575-Pred 01/26/2024 1.80  L/sec Final    FEF2575-Pre 01/26/2024 3.14  L/sec Final    JST0594-%Pred-Pre 01/26/2024 174  % Final    ExpTime-Pre 01/26/2024 3.47  sec Final    FIFMax-Pre 01/26/2024 5.01  L/sec Final    VC-Pred 01/26/2024 3.29  L Final    VC-Pre 01/26/2024 2.00  L Final    VC-%Pred-Pre  01/26/2024 60  % Final    IC-Pred 01/26/2024 2.11  L Final    IC-Pre 01/26/2024 1.75  L Final    IC-%Pred-Pre 01/26/2024 83  % Final    ERV-Pred 01/26/2024 1.05  L Final    ERV-Pre 01/26/2024 0.24  L Final    ERV-%Pred-Pre 01/26/2024 22  % Final    FEV1FEV6-Pred 01/26/2024 79  % Final    FEV1FEV6-Pre 01/26/2024 90  % Final    FRCPleth-Pred 01/26/2024 2.83  L Final    FRCPleth-Pre 01/26/2024 1.77  L Final    FRCPleth-%Pred-Pre 01/26/2024 62  % Final    RVPleth-Pred 01/26/2024 2.18  L Final    RVPleth-Pre 01/26/2024 1.53  L Final    RVPleth-%Pred-Pre 01/26/2024 70  % Final    TLCPleth-Pred 01/26/2024 5.27  L Final    TLCPleth-Pre 01/26/2024 3.53  L Final    TLCPleth-%Pred-Pre 01/26/2024 66  % Final    DLCOunc-Pred 01/26/2024 19.84  ml/min/mmHg Final    DLCOunc-Pre 01/26/2024 11.80  ml/min/mmHg Final    DLCOunc-%Pred-Pre 01/26/2024 59  % Final    VA-Pre 01/26/2024 3.14  L Final    VA-%Pred-Pre 01/26/2024 64  % Final    FEV1SVC-Pred 01/26/2024 65  % Final    FEV1SVC-Pre 01/26/2024 92  % Final

## 2024-02-03 LAB — BACTERIA UR CULT: NORMAL

## 2024-02-06 ENCOUNTER — HOSPITAL ENCOUNTER (OUTPATIENT)
Dept: CARDIAC REHAB | Facility: CLINIC | Age: 74
Discharge: HOME | End: 2024-02-06
Attending: STUDENT IN AN ORGANIZED HEALTH CARE EDUCATION/TRAINING PROGRAM
Payer: COMMERCIAL

## 2024-02-06 ENCOUNTER — TELEPHONE (OUTPATIENT)
Dept: UROLOGY | Facility: CLINIC | Age: 74
End: 2024-02-06
Payer: COMMERCIAL

## 2024-02-06 PROCEDURE — G0239 OTH RESP PROC, GROUP: HCPCS | Performed by: REHABILITATION PRACTITIONER

## 2024-02-06 NOTE — TELEPHONE ENCOUNTER
[facility] Call Center    Phone Message    May a detailed message be left on voicemail: yes     Reason for Call: Other: Pt calling regarding medication  estradiol (ESTRACE) 0.1 Mg and clobetasol (TEMOVATE) prescribed by Lilian. Pt states [facility] doesn't have prescription yet and pt says she was supposed to start taking medication yesterday. Please advise and call pt       Action Taken: Message routed to:  Other: Uro    Travel Screening: Not Applicable

## 2024-02-08 ENCOUNTER — HOSPITAL ENCOUNTER (OUTPATIENT)
Dept: CARDIAC REHAB | Facility: CLINIC | Age: 74
Discharge: HOME | End: 2024-02-08
Attending: STUDENT IN AN ORGANIZED HEALTH CARE EDUCATION/TRAINING PROGRAM
Payer: COMMERCIAL

## 2024-02-08 PROCEDURE — G0239 OTH RESP PROC, GROUP: HCPCS | Performed by: REHABILITATION PRACTITIONER

## 2024-02-09 DIAGNOSIS — M81.0 OSTEOPOROSIS, UNSPECIFIED OSTEOPOROSIS TYPE, UNSPECIFIED PATHOLOGICAL FRACTURE PRESENCE: ICD-10-CM

## 2024-02-09 RX ORDER — ALENDRONATE SODIUM 70 MG/1
TABLET ORAL
Qty: 12 TABLET | Refills: 0 | Status: SHIPPED | OUTPATIENT
Start: 2024-02-09 | End: 2024-04-29

## 2024-02-13 ENCOUNTER — HOSPITAL ENCOUNTER (OUTPATIENT)
Dept: CARDIAC REHAB | Facility: CLINIC | Age: 74
Discharge: HOME | End: 2024-02-13
Attending: STUDENT IN AN ORGANIZED HEALTH CARE EDUCATION/TRAINING PROGRAM
Payer: COMMERCIAL

## 2024-02-13 PROCEDURE — G0239 OTH RESP PROC, GROUP: HCPCS

## 2024-02-15 ENCOUNTER — HOSPITAL ENCOUNTER (OUTPATIENT)
Dept: CARDIAC REHAB | Facility: CLINIC | Age: 74
Discharge: HOME | End: 2024-02-15
Attending: STUDENT IN AN ORGANIZED HEALTH CARE EDUCATION/TRAINING PROGRAM
Payer: COMMERCIAL

## 2024-02-15 PROCEDURE — G0239 OTH RESP PROC, GROUP: HCPCS

## 2024-02-16 ENCOUNTER — ANCILLARY PROCEDURE (OUTPATIENT)
Dept: CT IMAGING | Facility: CLINIC | Age: 74
End: 2024-02-16
Attending: PHYSICIAN ASSISTANT
Payer: COMMERCIAL

## 2024-02-16 DIAGNOSIS — N39.0 RECURRENT UTI: ICD-10-CM

## 2024-02-16 LAB
CREAT BLD-MCNC: 0.8 MG/DL (ref 0.5–1)
EGFRCR SERPLBLD CKD-EPI 2021: >60 ML/MIN/1.73M2

## 2024-02-16 PROCEDURE — 82565 ASSAY OF CREATININE: CPT

## 2024-02-16 PROCEDURE — 74178 CT ABD&PLV WO CNTR FLWD CNTR: CPT

## 2024-02-16 PROCEDURE — 250N000011 HC RX IP 250 OP 636: Performed by: PHYSICIAN ASSISTANT

## 2024-02-16 PROCEDURE — 250N000009 HC RX 250: Performed by: PHYSICIAN ASSISTANT

## 2024-02-16 RX ORDER — IOPAMIDOL 755 MG/ML
90 INJECTION, SOLUTION INTRAVASCULAR ONCE
Status: COMPLETED | OUTPATIENT
Start: 2024-02-16 | End: 2024-02-16

## 2024-02-16 RX ADMIN — IOPAMIDOL 90 ML: 755 INJECTION, SOLUTION INTRAVENOUS at 09:15

## 2024-02-16 RX ADMIN — SODIUM CHLORIDE 120 ML: 9 INJECTION, SOLUTION INTRAVENOUS at 09:15

## 2024-02-20 ENCOUNTER — HOSPITAL ENCOUNTER (OUTPATIENT)
Dept: CARDIAC REHAB | Facility: CLINIC | Age: 74
Discharge: HOME | End: 2024-02-20
Attending: STUDENT IN AN ORGANIZED HEALTH CARE EDUCATION/TRAINING PROGRAM
Payer: COMMERCIAL

## 2024-02-20 PROCEDURE — G0239 OTH RESP PROC, GROUP: HCPCS

## 2024-02-22 ENCOUNTER — HOSPITAL ENCOUNTER (OUTPATIENT)
Dept: CARDIAC REHAB | Facility: CLINIC | Age: 74
Discharge: HOME | End: 2024-02-22
Attending: STUDENT IN AN ORGANIZED HEALTH CARE EDUCATION/TRAINING PROGRAM
Payer: COMMERCIAL

## 2024-02-22 PROCEDURE — G0239 OTH RESP PROC, GROUP: HCPCS | Performed by: CLINICAL EXERCISE PHYSIOLOGIST

## 2024-02-27 ENCOUNTER — HOSPITAL ENCOUNTER (OUTPATIENT)
Dept: CARDIAC REHAB | Facility: CLINIC | Age: 74
Discharge: HOME | End: 2024-02-27
Attending: STUDENT IN AN ORGANIZED HEALTH CARE EDUCATION/TRAINING PROGRAM
Payer: COMMERCIAL

## 2024-02-27 PROCEDURE — G0239 OTH RESP PROC, GROUP: HCPCS

## 2024-02-29 ENCOUNTER — TELEPHONE (OUTPATIENT)
Dept: GASTROENTEROLOGY | Facility: CLINIC | Age: 74
End: 2024-02-29
Payer: COMMERCIAL

## 2024-02-29 ENCOUNTER — HOSPITAL ENCOUNTER (OUTPATIENT)
Dept: CARDIAC REHAB | Facility: CLINIC | Age: 74
Discharge: HOME | End: 2024-02-29
Attending: STUDENT IN AN ORGANIZED HEALTH CARE EDUCATION/TRAINING PROGRAM
Payer: COMMERCIAL

## 2024-02-29 DIAGNOSIS — Z12.11 COLON CANCER SCREENING: Primary | ICD-10-CM

## 2024-02-29 PROCEDURE — G0239 OTH RESP PROC, GROUP: HCPCS

## 2024-02-29 RX ORDER — BISACODYL 5 MG/1
TABLET, DELAYED RELEASE ORAL
Qty: 4 TABLET | Refills: 0 | Status: ON HOLD | OUTPATIENT
Start: 2024-02-29 | End: 2024-03-13

## 2024-02-29 NOTE — TELEPHONE ENCOUNTER
Pre assessment completed for upcoming procedure.   (Please see previous telephone encounter notes for complete details)    Patient  returned call.       Procedure details:    Arrival time and facility location reviewed.    Pre op exam needed? Yes. Scheduled 3/4/24. Patient  advised to keep appointment as it is required prior to procedure.     Designated  policy reviewed. Instructed to have someone stay 24  hours post procedure.       Medication review:    Medications reviewed. Please see supporting documentation below. Holding recommendations discussed (if applicable).       Prep for procedure:     Procedure prep instructions reviewed.        Any additional information needed:  N/A      Patient  verbalized understanding and had no questions or concerns at this time.      Corina Frausto RN  Endoscopy Procedure Pre Assessment RN  [phone] option 4

## 2024-02-29 NOTE — TELEPHONE ENCOUNTER
Pre visit planning completed.      Procedure details:    Patient scheduled for Colonoscopy  on 3/13/2024.     Arrival time: 1030. Procedure time 1130    Pre op exam needed? Yes. Pre Op Exam scheduled 3/4/24 with Corina Lara NP    Facility location: [facility]; [address] [place] [address] [place] [address]    Sedation type: MAC    Indication for procedure: Screening       Chart review:     Electronic implanted devices? No    Recent diagnosis of diverticulitis within the last 6 weeks? No    Diabetic? No      Medication review:    Anticoagulants? No    NSAIDS? No NSAID medications per patient's medication list.  RN will verify with pre-assessment call.    Other medication HOLDING recommendations:  N/A      Prep for procedure:     Bowel prep recommendation: Extended prep Golytely    Due to:  BMI > 40.     Prep instructions sent via [facility] [employer] Bowel prep script sent to    [id number] - [place], [place] - [address] [facility]        Larisa Rivera RN  Endoscopy Procedure Pre Assessment RN  [phone] option 4

## 2024-02-29 NOTE — TELEPHONE ENCOUNTER
Attempted to contact patient in order to complete pre assessment questions.     No answer. Left message to return call to [phone] option 4    Missed call communication sent via [employer].    Lraisa Rivera RN  Endoscopy Procedure Pre-Assessment RN

## 2024-03-04 ENCOUNTER — MYC MEDICAL ADVICE (OUTPATIENT)
Dept: FAMILY MEDICINE | Facility: CLINIC | Age: 74
End: 2024-03-04

## 2024-03-04 ENCOUNTER — OFFICE VISIT (OUTPATIENT)
Dept: FAMILY MEDICINE | Facility: CLINIC | Age: 74
End: 2024-03-04
Payer: COMMERCIAL

## 2024-03-04 VITALS
RESPIRATION RATE: 18 BRPM | OXYGEN SATURATION: 97 % | TEMPERATURE: 97.6 F | HEART RATE: 67 BPM | DIASTOLIC BLOOD PRESSURE: 76 MMHG | HEIGHT: 65 IN | SYSTOLIC BLOOD PRESSURE: 118 MMHG | BODY MASS INDEX: 37.8 KG/M2 | WEIGHT: 226.9 LBS

## 2024-03-04 DIAGNOSIS — E66.01 CLASS 2 SEVERE OBESITY DUE TO EXCESS CALORIES WITH SERIOUS COMORBIDITY AND BODY MASS INDEX (BMI) OF 37.0 TO 37.9 IN ADULT (H): ICD-10-CM

## 2024-03-04 DIAGNOSIS — Z01.818 PREOP GENERAL PHYSICAL EXAM: Primary | ICD-10-CM

## 2024-03-04 DIAGNOSIS — E66.812 CLASS 2 SEVERE OBESITY DUE TO EXCESS CALORIES WITH SERIOUS COMORBIDITY AND BODY MASS INDEX (BMI) OF 37.0 TO 37.9 IN ADULT (H): ICD-10-CM

## 2024-03-04 DIAGNOSIS — J84.10 PULMONARY FIBROSIS (H): ICD-10-CM

## 2024-03-04 DIAGNOSIS — Z12.11 SPECIAL SCREENING FOR MALIGNANT NEOPLASMS, COLON: ICD-10-CM

## 2024-03-04 DIAGNOSIS — I10 BENIGN ESSENTIAL HYPERTENSION: ICD-10-CM

## 2024-03-04 LAB — HGB BLD-MCNC: 14.5 G/DL (ref 11.7–15.7)

## 2024-03-04 PROCEDURE — 85018 HEMOGLOBIN: CPT | Performed by: NURSE PRACTITIONER

## 2024-03-04 PROCEDURE — 36415 COLL VENOUS BLD VENIPUNCTURE: CPT | Performed by: NURSE PRACTITIONER

## 2024-03-04 PROCEDURE — 80048 BASIC METABOLIC PNL TOTAL CA: CPT | Performed by: NURSE PRACTITIONER

## 2024-03-04 PROCEDURE — 99214 OFFICE O/P EST MOD 30 MIN: CPT | Performed by: NURSE PRACTITIONER

## 2024-03-04 ASSESSMENT — PAIN SCALES - GENERAL: PAINLEVEL: NO PAIN (0)

## 2024-03-04 NOTE — PATIENT INSTRUCTIONS
Preparing for Your Surgery  Getting started  A nurse will call you to review your health history and instructions. They will give you an arrival time based on your scheduled surgery time. Please be ready to share:  Your doctor's clinic name and phone number  Your medical, surgical, and anesthesia history  A list of allergies and sensitivities  A list of medicines, including herbal treatments and over-the-counter drugs  Whether the patient has a legal guardian (ask how to send us the papers in advance)  Please tell us if you're pregnant--or if there's any chance you might be pregnant. Some surgeries may injure a fetus (unborn baby), so they require a pregnancy test. Surgeries that are safe for a fetus don't always need a test, and you can choose whether to have one.   If you have a child who's having surgery, please ask for a copy of Preparing for Your Child's Surgery.    Preparing for surgery  Within 10 to 30 days of surgery: Have a pre-op exam (sometimes called an H&P, or History and Physical). This can be done at a clinic or pre-operative center.  If you're having a , you may not need this exam. Talk to your care team.  At your pre-op exam, talk to your care team about all medicines you take. If you need to stop any medicines before surgery, ask when to start taking them again.  We do this for your safety. Many medicines can make you bleed too much during surgery. Some change how well surgery (anesthesia) drugs work.  Call your insurance company to let them know you're having surgery. (If you don't have insurance, call [phone].)  Call your clinic if there's any change in your health. This includes signs of a cold or flu (sore throat, runny nose, cough, rash, fever). It also includes a scrape or scratch near the surgery site.  If you have questions on the day of surgery, call your hospital or surgery center.  Eating and drinking guidelines  For your safety: Unless your surgeon tells you otherwise,  follow the guidelines below.  Eat and drink as usual until 8 hours before you arrive for surgery. After that, no food or milk.  Drink clear liquids until 2 hours before you arrive. These are liquids you can see through, like water, Gatorade, and Propel Water. They also include plain black coffee and tea (no cream or milk), candy, and breath mints. You can spit out gum when you arrive.  If you drink alcohol: Stop drinking it the night before surgery.  If your care team tells you to take medicine on the morning of surgery, it's okay to take it with a sip of water.  Preventing infection  Shower or bathe the night before and morning of your surgery. Follow the instructions your clinic gave you. (If no instructions, use regular soap.)  Don't shave or clip hair near your surgery site. We'll remove the hair if needed.  Don't smoke or vape the morning of surgery. You may chew nicotine gum up to 2 hours before surgery. A nicotine patch is okay.  Note: Some surgeries require you to completely quit smoking and nicotine. Check with your surgeon.  Your care team will make every effort to keep you safe from infection. We will:  Clean our hands often with soap and water (or an alcohol-based hand rub).  Clean the skin at your surgery site with a special soap that kills germs.  Give you a special gown to keep you warm. (Cold raises the risk of infection.)  Wear special hair covers, masks, gowns and gloves during surgery.  Give antibiotic medicine, if prescribed. Not all surgeries need antibiotics.  What to bring on the day of surgery  Photo ID and insurance card  Copy of your health care directive, if you have one  Glasses and hearing aids (bring cases)  You can't wear contacts during surgery  Inhaler and eye drops, if you use them (tell us about these when you arrive)  CPAP machine or breathing device, if you use them  A few personal items, if spending the night  If you have . . .  A pacemaker, ICD (cardiac defibrillator) or other  implant: Bring the ID card.  An implanted stimulator: Bring the remote control.  A legal guardian: Bring a copy of the certified (court-stamped) guardianship papers.  Please remove any jewelry, including body piercings. Leave jewelry and other valuables at home.  If you're going home the day of surgery  You must have a responsible adult drive you home. They should stay with you overnight as well.  If you don't have someone to stay with you, and you aren't safe to go home alone, we may keep you overnight. Insurance often won't pay for this.  After surgery  If it's hard to control your pain or you need more pain medicine, please call your surgeon's office.  Questions?   If you have any questions for your care team, list them here: _________________________________________________________________________________________________________________________________________________________________________ ____________________________________ ____________________________________ ____________________________________  For informational purposes only. Not to replace the advice of your health care provider. Copyright   2003, 2019 [place] [email or web address] [facility]. All rights reserved. Clinically reviewed by Julieta Roca MD. [employer] 915158 - REV 12/22.    How to Take Your Medication Before Surgery  - Take all of your medications before surgery as usual

## 2024-03-04 NOTE — PROGRESS NOTES
Preoperative Evaluation  [address]  [address]  [place] [address]  Phone: [phone]  Fax: [phone]  Primary Provider: Stephen Lara  Pre-op Performing Provider: STEPHEN LARA  Mar 4, 2024       Rhonda is a 73 year old, presenting for the following:  Pre-Op Exam        3/4/2024     2:08 PM   Additional Questions   Roomed by Karyna   Accompanied by Self         3/4/2024     2:08 PM   Patient Reported Additional Medications   Patient reports taking the following new medications None     Surgical Information  Surgery/Procedure: Colonoscopy   Surgery Location: [facility]   Surgeon: Dr. Nowak  Surgery Date: 03/13/2024  Time of Surgery: TBD   Where patient plans to recover: At home with family  Fax number for surgical facility: Note does not need to be faxed, will be available electronically in Epic.    Assessment & Plan     The proposed surgical procedure is considered INTERMEDIATE risk.    (Z01.818) Preop general physical exam  (primary encounter diagnosis)  Comment:   Plan: Basic metabolic panel  (Ca, Cl, CO2, Creat,         Gluc, K, Na, BUN), Hemoglobin            (Z12.11) Special screening for malignant neoplasms, colon  Comment:   Plan: pre op needed for colonoscopy.     (J84.10) Pulmonary fibrosis (H)  Comment:   Plan: stable    (I10) Benign essential hypertension  Comment: at goal  Plan: The current medical regimen is effective;  continue present plan and medications.     (E66.01,  Z68.37) Class 2 severe obesity due to excess calories with serious comorbidity and body mass index (BMI) of 37.0 to 37.9 in adult (H)  Comment: doing great!  Plan: She will continue to work on weight loss            - No identified additional risk factors other than previously addressed    Antiplatelet or Anticoagulation Medication Instructions   - Patient is on no antiplatelet or anticoagulation medications.    Additional Medication Instructions  Patient is  to take all scheduled medications on the day of surgery    Recommendation  APPROVAL GIVEN to proceed with proposed procedure, without further diagnostic evaluation.      I spent a total of 30 minutes on the day of the visit.   Time spent by me doing chart review, history and exam, documentation and further activities per the note    Subjective       HPI related to upcoming procedure: Needs colonoscopy due to occasional hypoxia        2/26/2024     1:02 PM   Preop Questions   1. Have you ever had a heart attack or stroke? No   2. Have you ever had surgery on your heart or blood vessels, such as a stent placement, a coronary artery bypass, or surgery on an artery in your head, neck, heart, or legs? No   3. Do you have chest pain with activity? No   4. Do you have a history of  heart failure? No   5. Do you currently have a cold, bronchitis or symptoms of other infection? No   6. Do you have a cough, shortness of breath, or wheezing? YES - pulmonary fibrosis   7. Do you or anyone in your family have previous history of blood clots? No   8. Do you or does anyone in your family have a serious bleeding problem such as prolonged bleeding following surgeries or cuts? No   9. Have you ever had problems with anemia or been told to take iron pills? No   10. Have you had any abnormal blood loss such as black, tarry or bloody stools, or abnormal vaginal bleeding? No   11. Have you ever had a blood transfusion? No   12. Are you willing to have a blood transfusion if it is medically needed before, during, or after your surgery? Yes   13. Have you or any of your relatives ever had problems with anesthesia? No   14. Do you have sleep apnea, excessive snoring or daytime drowsiness? No   15. Do you have any artifical heart valves or other implanted medical devices like a pacemaker, defibrillator, or continuous glucose monitor? No   16. Do you have artificial joints? YES - bilateral knees   17. Are you allergic to latex? No        Health Care Directive  Patient has a Health Care Directive on file      Preoperative Review of    reviewed - no record of controlled substances prescribed.          Patient Active Problem List    Diagnosis Date Noted    Closed compression fracture of thoracic vertebra, initial encounter (H) 11/01/2018     Priority: Medium    Osteoporosis, unspecified osteoporosis type, unspecified pathological fracture presence 05/29/2018     Priority: Medium    Right knee pain 10/24/2016     Priority: Medium    Aftercare following right knee joint replacement surgery 10/24/2016     Priority: Medium    Benign essential hypertension 10/20/2016     Priority: Medium    Physical deconditioning 10/17/2016     Priority: Medium    BMI of 40.0-44.9, adult (H) 09/15/2016     Priority: Medium    Left knee pain 02/17/2016     Priority: Medium    Other orthopedic aftercare(V54.89) 02/17/2016     Priority: Medium    Advance care planning 02/16/2016     Priority: Medium     Advance Care Planning 2/16/2016: Receipt of ACP document:  Received: Health Care Directive which was witnessed or notarized on 12/17/14.  Document previously scanned on 2/3/16.  Validation form completed and sent to be scanned.  Code Status reflects choices in most recent ACP document.  Confirmed/documented designated decision maker(s).  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 Provider to review        Past Medical History:   Diagnosis Date     Arthritis     Mumps     Obesity, unspecified     Pure hypercholesterolemia     Unspecified essential hypertension     Unspecified hypothyroidism      Past Surgical History:   Procedure Laterality Date    ARTHROPLASTY KNEE Left 02/03/2016    Procedure: ARTHROPLASTY KNEE;  Surgeon: Abdiel Ledesma MD;  Location: [facility] OR    ARTHROPLASTY KNEE Right 10/11/2016    Procedure: ARTHROPLASTY KNEE;  Surgeon: Abdiel Ledesma MD;  Location:  OR    BIOPSY  08/2014    breast biopsy was negative    COLONOSCOPY  2013    every 5 years starting at age 52    orif Left     wrist    SURGICAL HISTORY OF -       wisdom teeth extraction     Current Outpatient Medications   Medication Sig Dispense Refill    albuterol (PROAIR HFA/PROVENTIL HFA/VENTOLIN HFA) 108 (90 Base) MCG/ACT inhaler Inhale 2 puffs into the lungs every 6 hours as needed for shortness of breath, wheezing or cough 18 g 3    alendronate (FOSAMAX) 70 MG tablet TAKE 1 TABLET EVERY 7 DAYS WITH 8 OUNCES OF WATER, 30 MINUTES BEFORE BREAKFAST AND REMAIN UPRIGHT DURING THIS TIME. 12 tablet 0    calcium carbonate (TUMS) 500 MG chewable tablet Take 1-2 chew tab by mouth daily as needed for heartburn      Calcium-Phosphorus-Vitamin D (CALCIUM GUMMIES) 250-100-500 MG-MG-UNIT CHEW Take 2 tablets by mouth daily as needed      clobetasol (TEMOVATE) 0.05 % external ointment Apply topically twice a week Apply along vulva 2x/week at night. 60 g 3    Cobalamine Combinations (B-12) 100-5000 MCG SUBL Place 1 tablet under the tongue daily as needed 50 tablet 0    diphenhydrAMINE (BENADRYL) 25 MG capsule Take 1 capsule (25 mg) by mouth nightly as needed for sleep 56 capsule     estradiol (ESTRACE) 0.1 MG/GM vaginal cream Place 2 g vaginally twice a week 40 g 3    fluticasone (FLONASE) 50 MCG/ACT spray USE 2 SPRAYS IN EACH NOSTRIL DAILY 16 g PRN    levothyroxine (SYNTHROID/LEVOTHROID) 100 MCG tablet Take 1 tablet (100 mcg) by mouth daily 90 tablet 3    MAGNESIUM PO Take by mouth daily  Magnesium 7      multivitamin, therapeutic with minerals (THERA-VIT-M) TABS Take 1 tablet by mouth daily 30 each 0    simvastatin (ZOCOR) 20 MG tablet Take 1 tablet (20 mg) by mouth at bedtime 90 tablet 3    valsartan (DIOVAN) 160 MG tablet Take 1 tablet (160 mg) by mouth daily 90 tablet 3    bisacodyl (DULCOLAX) 5 MG EC tablet Two days prior to exam take two (2) tablets at 4pm. One day prior to exam take two (2) tablets at 4pm (Patient not taking: Reported on 3/4/2024) 4 tablet 0    polyethylene glycol (GOLYTELY) 236 g suspension Take as directed. Two days before your exam fill the first container with water. Cover and shake until mixed well. At 5:00pm drink one 8oz glass every 10-15 minutes until half (1/2) of the first container is empty. Store the remainder in the refrigerator. One day before your exam at 5:00pm drink the second half of the first container until it is gone. Before you go to bed mix the second container with water and put in refrigerator. Six hours before your check in time drink one 8oz glass every 10-15 minutes until half of container is empty. Discard the remainder of solution. (Patient not taking: Reported on 3/4/2024) 8000 mL 0       Allergies   Allergen Reactions    No Known Drug Allergy         Social History     Tobacco Use    Smoking status: Never     Passive exposure: Never    Smokeless tobacco: Never   Substance Use Topics    Alcohol use: Yes     Comment: very light use       History   Drug Use No         Review of Systems    Review of Systems  CONSTITUTIONAL: NEGATIVE for fever, chills, change in weight  INTEGUMENTARY/SKIN: NEGATIVE for worrisome rashes, moles or lesions  EYES: NEGATIVE for vision changes or irritation  ENT/MOUTH: NEGATIVE for ear, mouth and throat problems  RESP: NEGATIVE for significant cough or change in baseline SOB  CV: NEGATIVE for chest pain, palpitations or peripheral edema  GI: NEGATIVE for nausea, abdominal pain, heartburn, or change in bowel habits  :  "NEGATIVE for frequency, dysuria, or hematuria  MUSCULOSKELETAL: NEGATIVE for significant arthralgias or myalgia  NEURO: NEGATIVE for weakness, dizziness or paresthesias  ENDOCRINE: NEGATIVE for temperature intolerance, skin/hair changes  HEME: NEGATIVE for bleeding problems  PSYCHIATRIC: NEGATIVE for changes in mood or affect    Objective    /76 (BP Location: Right arm, Patient Position: Sitting, Cuff Size: Adult Large)   Pulse 67   Temp 97.6  F (36.4  C) (Temporal)   Resp 18   Ht 1.646 m (5' 4.8\")   Wt 102.9 kg (226 lb 14.4 oz)   LMP 08/01/2004   SpO2 97%   Breastfeeding No   BMI 37.99 kg/m     Estimated body mass index is 37.99 kg/m  as calculated from the following:    Height as of this encounter: 1.646 m (5' 4.8\").    Weight as of this encounter: 102.9 kg (226 lb 14.4 oz).  Physical Exam  GENERAL: alert and no distress  EYES: Eyes grossly normal to inspection, PERRL and conjunctivae and sclerae normal  HENT: ear canals and TM's normal, nose and mouth without ulcers or lesions  NECK: no adenopathy, no asymmetry, masses, or scars  RESP: lungs clear to auscultation - no rales, rhonchi or wheezes  CV: regular rate and rhythm, normal S1 S2, no S3 or S4, no murmur, click or rub, no peripheral edema  ABDOMEN: soft, nontender, no hepatosplenomegaly, no masses and bowel sounds normal  MS: no gross musculoskeletal defects noted, no edema  SKIN: no suspicious lesions or rashes  NEURO: Normal strength and tone, mentation intact and speech normal  PSYCH: mentation appears normal, affect normal/bright    Recent Labs   Lab Test 02/16/24  0917 12/14/23  1351 12/08/23  1330   HGB  --  15.0  --    PLT  --  211  --    NA  --  139 133*   POTASSIUM  --  4.4 4.8   CR 0.8 0.90 0.81        Diagnostics  Labs pending at this time.  Results will be reviewed when available.   No EKG this visit, completed in the last 90 days.    Revised Cardiac Risk Index (RCRI)  The patient has the following serious cardiovascular risks for " perioperative complications:   - No serious cardiac risks = 0 points     RCRI Interpretation: 0 points: Class I (very low risk - 0.4% complication rate)     .jbbmi    Signed Electronically by: Corina Lara NP  Copy of this evaluation report is provided to requesting physician.

## 2024-03-05 LAB
ANION GAP SERPL CALCULATED.3IONS-SCNC: 9 MMOL/L (ref 7–15)
BUN SERPL-MCNC: 11.1 MG/DL (ref 8–23)
CALCIUM SERPL-MCNC: 9.5 MG/DL (ref 8.8–10.2)
CHLORIDE SERPL-SCNC: 99 MMOL/L (ref 98–107)
CREAT SERPL-MCNC: 0.87 MG/DL (ref 0.51–0.95)
DEPRECATED HCO3 PLAS-SCNC: 26 MMOL/L (ref 22–29)
EGFRCR SERPLBLD CKD-EPI 2021: 71 ML/MIN/1.73M2
GLUCOSE SERPL-MCNC: 90 MG/DL (ref 70–99)
POTASSIUM SERPL-SCNC: 4.8 MMOL/L (ref 3.4–5.3)
SODIUM SERPL-SCNC: 134 MMOL/L (ref 135–145)

## 2024-03-05 NOTE — TELEPHONE ENCOUNTER
Patient's [employer] message:    Erica, as I was driving home, I was thinking about the sounds you heard in my lungs. Not sure if I mentioned it, but I had a friend visiting [facility] to help me organize my office/craft room. There were a bunch of boxes that hadn't been unpacked from the move. We were both coughing and she mentioned the dust coming off some of the boxes and bins. Most of the things we went through had been in my basement or the upstairs room that I rarely used. So could the sounds you were hearing possibly be from breathing in more dust than usual? Just thought I would ask. Thanks, Gwyn     Please advise. Thank you.    Ivonne Merida, RN, BSN, PHN  [facility]

## 2024-03-06 NOTE — TELEPHONE ENCOUNTER
Writer responded via [employer].  MALICK RendonN, RN-BC  [facility]

## 2024-03-12 ENCOUNTER — ANESTHESIA EVENT (OUTPATIENT)
Dept: GASTROENTEROLOGY | Facility: CLINIC | Age: 74
End: 2024-03-12
Payer: COMMERCIAL

## 2024-03-12 ASSESSMENT — COPD QUESTIONNAIRES: COPD: 0

## 2024-03-12 ASSESSMENT — LIFESTYLE VARIABLES: TOBACCO_USE: 0

## 2024-03-13 ENCOUNTER — HOSPITAL ENCOUNTER (OUTPATIENT)
Facility: CLINIC | Age: 74
Discharge: HOME | End: 2024-03-13
Attending: COLON & RECTAL SURGERY | Admitting: COLON & RECTAL SURGERY
Payer: COMMERCIAL

## 2024-03-13 ENCOUNTER — ANESTHESIA (OUTPATIENT)
Dept: GASTROENTEROLOGY | Facility: CLINIC | Age: 74
End: 2024-03-13
Payer: COMMERCIAL

## 2024-03-13 VITALS
SYSTOLIC BLOOD PRESSURE: 110 MMHG | DIASTOLIC BLOOD PRESSURE: 54 MMHG | RESPIRATION RATE: 23 BRPM | BODY MASS INDEX: 37.65 KG/M2 | OXYGEN SATURATION: 98 % | HEIGHT: 65 IN | HEART RATE: 54 BPM | WEIGHT: 226 LBS

## 2024-03-13 LAB — COLONOSCOPY: NORMAL

## 2024-03-13 PROCEDURE — 250N000011 HC RX IP 250 OP 636: Performed by: NURSE ANESTHETIST, CERTIFIED REGISTERED

## 2024-03-13 PROCEDURE — 250N000009 HC RX 250: Performed by: NURSE ANESTHETIST, CERTIFIED REGISTERED

## 2024-03-13 PROCEDURE — 45378 DIAGNOSTIC COLONOSCOPY: CPT | Performed by: NURSE ANESTHETIST, CERTIFIED REGISTERED

## 2024-03-13 PROCEDURE — 45378 DIAGNOSTIC COLONOSCOPY: CPT | Performed by: COLON & RECTAL SURGERY

## 2024-03-13 PROCEDURE — 999N000010 HC STATISTIC ANES STAT CODE-CRNA PER MINUTE: Performed by: COLON & RECTAL SURGERY

## 2024-03-13 PROCEDURE — G0105 COLORECTAL SCRN; HI RISK IND: HCPCS | Performed by: COLON & RECTAL SURGERY

## 2024-03-13 PROCEDURE — 258N000003 HC RX IP 258 OP 636: Performed by: NURSE ANESTHETIST, CERTIFIED REGISTERED

## 2024-03-13 PROCEDURE — 45378 DIAGNOSTIC COLONOSCOPY: CPT | Performed by: ANESTHESIOLOGY

## 2024-03-13 PROCEDURE — 370N000017 HC ANESTHESIA TECHNICAL FEE, PER MIN: Performed by: COLON & RECTAL SURGERY

## 2024-03-13 PROCEDURE — 99100 ANES PT EXTEME AGE<1 YR&>70: CPT | Performed by: NURSE ANESTHETIST, CERTIFIED REGISTERED

## 2024-03-13 RX ORDER — PROPOFOL 10 MG/ML
INJECTION, EMULSION INTRAVENOUS PRN
Status: DISCONTINUED | OUTPATIENT
Start: 2024-03-13 | End: 2024-03-13

## 2024-03-13 RX ORDER — ACETAMINOPHEN 325 MG/1
975 TABLET ORAL
Status: DISCONTINUED | OUTPATIENT
Start: 2024-03-13 | End: 2024-03-13 | Stop reason: HOSPADM

## 2024-03-13 RX ORDER — NALOXONE HYDROCHLORIDE 0.4 MG/ML
0.1 INJECTION, SOLUTION INTRAMUSCULAR; INTRAVENOUS; SUBCUTANEOUS
Status: DISCONTINUED | OUTPATIENT
Start: 2024-03-13 | End: 2024-03-13 | Stop reason: HOSPADM

## 2024-03-13 RX ORDER — LIDOCAINE 40 MG/G
CREAM TOPICAL
Status: DISCONTINUED | OUTPATIENT
Start: 2024-03-13 | End: 2024-03-13 | Stop reason: HOSPADM

## 2024-03-13 RX ORDER — ONDANSETRON 2 MG/ML
INJECTION INTRAMUSCULAR; INTRAVENOUS PRN
Status: DISCONTINUED | OUTPATIENT
Start: 2024-03-13 | End: 2024-03-13

## 2024-03-13 RX ORDER — LIDOCAINE HYDROCHLORIDE 20 MG/ML
INJECTION, SOLUTION INFILTRATION; PERINEURAL PRN
Status: DISCONTINUED | OUTPATIENT
Start: 2024-03-13 | End: 2024-03-13

## 2024-03-13 RX ORDER — SODIUM CHLORIDE, SODIUM LACTATE, POTASSIUM CHLORIDE, CALCIUM CHLORIDE 600; 310; 30; 20 MG/100ML; MG/100ML; MG/100ML; MG/100ML
INJECTION, SOLUTION INTRAVENOUS CONTINUOUS PRN
Status: DISCONTINUED | OUTPATIENT
Start: 2024-03-13 | End: 2024-03-13

## 2024-03-13 RX ORDER — FENTANYL CITRATE 50 UG/ML
25 INJECTION, SOLUTION INTRAMUSCULAR; INTRAVENOUS
Status: DISCONTINUED | OUTPATIENT
Start: 2024-03-13 | End: 2024-03-13 | Stop reason: HOSPADM

## 2024-03-13 RX ORDER — ONDANSETRON 4 MG/1
4 TABLET, ORALLY DISINTEGRATING ORAL EVERY 30 MIN PRN
Status: DISCONTINUED | OUTPATIENT
Start: 2024-03-13 | End: 2024-03-13 | Stop reason: HOSPADM

## 2024-03-13 RX ORDER — PROPOFOL 10 MG/ML
INJECTION, EMULSION INTRAVENOUS CONTINUOUS PRN
Status: DISCONTINUED | OUTPATIENT
Start: 2024-03-13 | End: 2024-03-13

## 2024-03-13 RX ORDER — ONDANSETRON 2 MG/ML
4 INJECTION INTRAMUSCULAR; INTRAVENOUS EVERY 30 MIN PRN
Status: DISCONTINUED | OUTPATIENT
Start: 2024-03-13 | End: 2024-03-13 | Stop reason: HOSPADM

## 2024-03-13 RX ORDER — OXYCODONE HYDROCHLORIDE 5 MG/1
10 TABLET ORAL
Status: DISCONTINUED | OUTPATIENT
Start: 2024-03-13 | End: 2024-03-13 | Stop reason: HOSPADM

## 2024-03-13 RX ORDER — ONDANSETRON 2 MG/ML
4 INJECTION INTRAMUSCULAR; INTRAVENOUS
Status: DISCONTINUED | OUTPATIENT
Start: 2024-03-13 | End: 2024-03-13 | Stop reason: HOSPADM

## 2024-03-13 RX ORDER — OXYCODONE HYDROCHLORIDE 5 MG/1
5 TABLET ORAL
Status: DISCONTINUED | OUTPATIENT
Start: 2024-03-13 | End: 2024-03-13 | Stop reason: HOSPADM

## 2024-03-13 RX ADMIN — LIDOCAINE HYDROCHLORIDE 50 MG: 20 INJECTION, SOLUTION INFILTRATION; PERINEURAL at 12:03

## 2024-03-13 RX ADMIN — SODIUM CHLORIDE, POTASSIUM CHLORIDE, SODIUM LACTATE AND CALCIUM CHLORIDE: 600; 310; 30; 20 INJECTION, SOLUTION INTRAVENOUS at 12:03

## 2024-03-13 RX ADMIN — PROPOFOL 150 MCG/KG/MIN: 10 INJECTION, EMULSION INTRAVENOUS at 12:03

## 2024-03-13 RX ADMIN — PROPOFOL 40 MG: 10 INJECTION, EMULSION INTRAVENOUS at 12:05

## 2024-03-13 RX ADMIN — ONDANSETRON 4 MG: 2 INJECTION INTRAMUSCULAR; INTRAVENOUS at 12:03

## 2024-03-13 ASSESSMENT — ACTIVITIES OF DAILY LIVING (ADL)
ADLS_ACUITY_SCORE: 38
ADLS_ACUITY_SCORE: 36
ADLS_ACUITY_SCORE: 38
ADLS_ACUITY_SCORE: 38

## 2024-03-13 NOTE — ANESTHESIA CARE TRANSFER NOTE
Patient: Rhonda Trotter    Procedure: Procedure(s):  Colonoscopy       Diagnosis: Special screening for malignant neoplasms, colon [Z12.11]  Diagnosis Additional Information: No value filed.    Anesthesia Type:   MAC     Note:    Oropharynx: oropharynx clear of all foreign objects and spontaneously breathing  Level of Consciousness: awake  Oxygen Supplementation: room air    Independent Airway: airway patency satisfactory and stable  Dentition: dentition unchanged  Vital Signs Stable: post-procedure vital signs reviewed and stable  Report to RN Given: handoff report given  Patient transferred to: Phase II (endo phase II)    Handoff Report: Identifed the Patient, Identified the Reponsible Provider, Reviewed the pertinent medical history, Discussed the surgical course, Reviewed Intra-OP anesthesia mangement and issues during anesthesia, Set expectations for post-procedure period and Allowed opportunity for questions and acknowledgement of understanding      Vitals:  Vitals Value Taken Time   BP     Temp     Pulse 57 03/13/24 1228   Resp 17 03/13/24 1228   SpO2 97 % 03/13/24 1228   Vitals shown include unfiled device data.    Electronically Signed By: JODIE Salgado CRNA  March 13, 2024  12:29 PM

## 2024-03-13 NOTE — ANESTHESIA POSTPROCEDURE EVALUATION
Patient: Rhonda Trotter    Procedure: Procedure(s):  Colonoscopy       Anesthesia Type:  MAC    Note:  Disposition: Outpatient   Postop Pain Control: Uneventful            Sign Out: Well controlled pain   PONV: No   Neuro/Psych: Uneventful            Sign Out: Acceptable/Baseline neuro status   Airway/Respiratory: Uneventful            Sign Out: Acceptable/Baseline resp. status   CV/Hemodynamics: Uneventful            Sign Out: Acceptable CV status; No obvious hypovolemia; No obvious fluid overload   Other NRE:    DID A NON-ROUTINE EVENT OCCUR?            Last vitals:  Vitals Value Taken Time   /54 03/13/24 1240   Temp     Pulse 55 03/13/24 1251   Resp 25 03/13/24 1251   SpO2 98 % 03/13/24 1245   Vitals shown include unfiled device data.    Electronically Signed By: Steph Lester MD  March 13, 2024  2:03 PM

## 2024-03-13 NOTE — H&P
History and physical examination reviewed  Will proceed with surveillance colonoscopy under LI CONN MD

## 2024-03-13 NOTE — ANESTHESIA PREPROCEDURE EVALUATION
Anesthesia Pre-Procedure Evaluation    Patient: Rhonda Trotter   MRN: [id number] : 1950        Procedure : Procedure(s):  Colonoscopy          Past Medical History:   Diagnosis Date    Arthritis     Mumps     Obesity, unspecified     Pure hypercholesterolemia     Unspecified essential hypertension     Unspecified hypothyroidism       Past Surgical History:   Procedure Laterality Date    ARTHROPLASTY KNEE Left 2016    Procedure: ARTHROPLASTY KNEE;  Surgeon: Abdiel Ledesma MD;  Location:  OR    ARTHROPLASTY KNEE Right 10/11/2016    Procedure: ARTHROPLASTY KNEE;  Surgeon: Abdiel Ledesma MD;  Location:  OR    BIOPSY  2014    breast biopsy was negative    COLONOSCOPY  2013    every 5 years starting at age 52    orif Left     wrist    SURGICAL HISTORY OF -       wisdom teeth extraction      Allergies   Allergen Reactions    No Known Drug Allergy       Social History     Tobacco Use    Smoking status: Never     Passive exposure: Never    Smokeless tobacco: Never   Substance Use Topics    Alcohol use: Yes     Comment: very light use      Wt Readings from Last 1 Encounters:   24 102.9 kg (226 lb 14.4 oz)        Anesthesia Evaluation   Pt has had prior anesthetic. Type: MAC.        ROS/MED HX  ENT/Pulmonary: Comment: Pulmonary fibrosis, undergoing pulm rehab   (-) tobacco use, asthma, COPD and sleep apnea   Neurologic:  - neg neurologic ROS     Cardiovascular:     (+) Dyslipidemia hypertension- -   -  - -                                      METS/Exercise Tolerance:     Hematologic:  - neg hematologic  ROS     Musculoskeletal:   (+)  arthritis,             GI/Hepatic:    (-) GERD and liver disease   Renal/Genitourinary:    (-) renal disease   Endo:     (+)          thyroid problem, hypothyroidism,    Obesity,       Psychiatric/Substance Use:       Infectious Disease:       Malignancy:       Other:            Physical Exam    Airway        Mallampati: II   TM distance: > 3 FB   Neck  "ROM: full   Mouth opening: > 3 cm    Respiratory Devices and Support         Dental           Cardiovascular          Rhythm and rate: regular     Pulmonary           breath sounds clear to auscultation           OUTSIDE LABS:  CBC:   Lab Results   Component Value Date    WBC 6.9 12/14/2023    WBC 6.0 11/09/2020    HGB 14.5 03/04/2024    HGB 15.0 12/14/2023    HCT 44.3 12/14/2023    HCT 41.5 11/09/2020     12/14/2023     11/09/2020     BMP:   Lab Results   Component Value Date     (L) 03/04/2024     12/14/2023    POTASSIUM 4.8 03/04/2024    POTASSIUM 4.4 12/14/2023    CHLORIDE 99 03/04/2024    CHLORIDE 101 12/14/2023    CO2 26 03/04/2024    CO2 28 12/14/2023    BUN 11.1 03/04/2024    BUN 8 12/14/2023    CR 0.87 03/04/2024    CR 0.8 02/16/2024    GLC 90 03/04/2024    GLC 99 12/14/2023     COAGS: No results found for: \"PTT\", \"INR\", \"FIBR\"  POC:   Lab Results   Component Value Date     (H) 02/05/2016     HEPATIC:   Lab Results   Component Value Date    ALBUMIN 3.8 09/14/2015    PROTTOTAL 8.0 09/14/2015    ALT 31 09/14/2015    AST 20 09/14/2015    GGT 50 (H) 04/04/2006    ALKPHOS 179 (H) 09/14/2015    BILITOTAL 0.5 09/14/2015     OTHER:   Lab Results   Component Value Date    A1C 5.3 10/03/2017    KAIN 9.5 03/04/2024    TSH 1.58 12/08/2023    T4 1.25 02/05/2004    SED 21 08/07/2023       Anesthesia Plan    ASA Status:  3       Anesthesia Type: MAC.     - Reason for MAC: straight local not clinically adequate, immobility needed              Consents    Anesthesia Plan(s) and associated risks, benefits, and realistic alternatives discussed. Questions answered and patient/representative(s) expressed understanding.     - Discussed: Risks, Benefits and Alternatives for BOTH SEDATION and the PROCEDURE were discussed     - Discussed with:  Patient            Postoperative Care       PONV prophylaxis: Ondansetron (or other 5HT-3)     Comments:               Ren Watkins MD    I have " "reviewed the pertinent notes and labs in the chart from the past 30 days and (re)examined the patient.  Any updates or changes from those notes are reflected in this note.     # Hyponatremia: Lowest Na = 134 mmol/L in last 30 days, will monitor as appropriate          # Obesity: Estimated body mass index is 37.99 kg/m  as calculated from the following:    Height as of 3/4/24: 1.646 m (5' 4.8\").    Weight as of 3/4/24: 102.9 kg (226 lb 14.4 oz).    "

## 2024-03-14 ENCOUNTER — HOSPITAL ENCOUNTER (OUTPATIENT)
Dept: CARDIAC REHAB | Facility: CLINIC | Age: 74
Discharge: HOME | End: 2024-03-14
Attending: STUDENT IN AN ORGANIZED HEALTH CARE EDUCATION/TRAINING PROGRAM
Payer: COMMERCIAL

## 2024-03-14 PROCEDURE — G0238 OTH RESP PROC, INDIV: HCPCS | Performed by: CLINICAL EXERCISE PHYSIOLOGIST

## 2024-03-18 ENCOUNTER — ALLIED HEALTH/NURSE VISIT (OUTPATIENT)
Dept: UROLOGY | Facility: CLINIC | Age: 74
End: 2024-03-18
Payer: COMMERCIAL

## 2024-03-18 DIAGNOSIS — N39.0 RECURRENT UTI: Primary | ICD-10-CM

## 2024-03-18 LAB
ALBUMIN UR-MCNC: NEGATIVE MG/DL
APPEARANCE UR: CLEAR
BILIRUB UR QL STRIP: NEGATIVE
COLOR UR AUTO: YELLOW
GLUCOSE UR STRIP-MCNC: NEGATIVE MG/DL
HGB UR QL STRIP: ABNORMAL
KETONES UR STRIP-MCNC: NEGATIVE MG/DL
LEUKOCYTE ESTERASE UR QL STRIP: NEGATIVE
NITRATE UR QL: NEGATIVE
PH UR STRIP: 7 [PH] (ref 5–7)
SP GR UR STRIP: 1.01 (ref 1–1.03)
UROBILINOGEN UR STRIP-ACNC: 0.2 E.U./DL

## 2024-03-18 PROCEDURE — 51701 INSERT BLADDER CATHETER: CPT

## 2024-03-18 PROCEDURE — 81003 URINALYSIS AUTO W/O SCOPE: CPT | Mod: QW

## 2024-03-18 PROCEDURE — 87086 URINE CULTURE/COLONY COUNT: CPT

## 2024-03-18 NOTE — PROGRESS NOTES
Chief Complaint   Patient presents with    Recurrent UTI     Patient here today for Cath UA/UC       Rhonda Trotter comes into clinic today for Recurrent UTI   at the request of Esther Quintana Ordering Provider for Cathed UAUC.      Rhonda Trotter presents to the clinic for catheter specimen  Reason for insertion: UA/UC before Cystoscopy   Order has been verified. Yes     Catheter successfully inserted into the urethral meatus in the usual sterile fashion without immediate complication.  Type of catheter placed: 14 fr  Urine is clear in color.Yellow  200 cc's of urine output returned.            This service provided today was under the supervising provider of the day Irina Ho, who was available if needed.    EMIL Polanco

## 2024-03-20 LAB — BACTERIA UR CULT: NO GROWTH

## 2024-03-27 ENCOUNTER — OFFICE VISIT (OUTPATIENT)
Dept: UROLOGY | Facility: CLINIC | Age: 74
End: 2024-03-27
Payer: COMMERCIAL

## 2024-03-27 VITALS
HEIGHT: 65 IN | WEIGHT: 223 LBS | BODY MASS INDEX: 37.15 KG/M2 | HEART RATE: 66 BPM | DIASTOLIC BLOOD PRESSURE: 78 MMHG | OXYGEN SATURATION: 96 % | SYSTOLIC BLOOD PRESSURE: 123 MMHG

## 2024-03-27 DIAGNOSIS — N39.0 RECURRENT UTI: Primary | ICD-10-CM

## 2024-03-27 PROCEDURE — 52000 CYSTOURETHROSCOPY: CPT | Performed by: UROLOGY

## 2024-03-27 PROCEDURE — 99212 OFFICE O/P EST SF 10 MIN: CPT | Mod: 25 | Performed by: UROLOGY

## 2024-03-27 NOTE — PROGRESS NOTES
"March 27, 2024    Return visit    Patient returns today for follow up.  Has not had any UTI in the past 3 months, She denies any changes in her health since last visit.    /78   Pulse 66   Ht 1.651 m (5' 5\")   Wt 101.2 kg (223 lb)   LMP 08/01/2004   SpO2 96%   BMI 37.11 kg/m    She is comfortable, in no distress, non-labored breathing.  Abdomen is soft, non-tender, non-distended.  Normal external female genitalia.  Negative CST.  Pelvic exam is remarkable for some hard stool in the rectal vault    Cystoscopy Note: After informed consent was obtained patient was prepped and draped in the standard fashion.  The flexible cystoscope was inserted into a normal appearing urethral meatus.  The urothelium was carefully examined and there was a tiny erythematous spot in the posterior bladder but no tumors, masses, stones, foreign bodies, or other urothelial abnormalities noted.  Bilateral ureteral orifices were noted in the normal orthotopic position, 2 ureteral orifices notes on her left  The cystoscope was retroflexed and the bladder neck was unremarkable.  The urethra was carefully examined upon removing the cystoscope and was unremarkable.  Patient tolerated the procedure without complications noted.            A/P: 73 year old F with (N39.0) Recurrent UTI, CT with bilateral duplicated system.    No UTIs 3 months since starting fiber, increasing water and using estrogen cream    Unclear the small area of erythema but no other symptoms so will monitor for now    RTC 6 months to see Susanna to reassess bladder symptoms and UTI    10 minutes were spent today on the date of the encounter in reviewing the EMR, direct patient care, coordination of care and documentation in addition to the cystoscopy procedure    Rebecca Shepherd MD MPH  (she/her/hers)   of Urology  [facility]      CC  Patient Care Team:  Corina Lara NP as PCP - General  [place], Abdiel Huber MD as Assigned " Pulmonology Provider  Abdiel Torres MD as MD (Critical Care)  Corina Lara NP as Assigned PCP  Speaker, JETT Hurt as Cardiac Rehabilitation Therapist  Esther Quintana PA-C as Physician Assistant  Esther Quintana PA-C as Assigned Surgical Provider

## 2024-03-27 NOTE — PATIENT INSTRUCTIONS
"Continue the estrogen cream and the citrucel    Websites with free information:    [employer] patient website: www.voicesforpfd.org    Total Control Program: www.totalcontrolprogram.com    Supplements to prevent UTI to consider  -Probiotics  -Cranberry (for these products let them know a doctor is recommending them)   [place]: https://[employer]/   Theracran HP by [employer] [facility] [address]  -d-mannose 2gm daily  -Vitamin C 500-1000mg twice a day    It was a pleasure meeting with you today.  Thank you for allowing me and my team the privilege of caring for you today.  YOU are the reason we are here, and I truly hope we provided you with the excellent service you deserve.  Please let us know if there is anything else we can do for you so that we can be sure you are leaving completely satisfied with your care experience.                             AFTER YOUR CYSTOSCOPY  ?  ?  You have just completed a cystoscopy, or \"cysto\", which allowed your physician to learn more about your bladder (or to remove a stent placed after surgery). We suggest that you continue to avoid caffeine, fruit juice, and alcohol for the next 24 hours, however, you are encouraged to return to your normal activities.  ?  ?  A few things that are considered normal after your cystoscopy:  ?  * small amount of bleeding (or spotting) that clears within the next 24 hours  ?  * slight burning sensation with urination  ?  * sensation of needing to void (urinate) more frequently  ?  * the feeling of \"air\" in your urine  ?  * mild discomfort that is relieved with Tylenol    * bladder spasms  ?  ?  ?  Please contact our office promptly if you:  ?  * develop a fever above 101 degrees  ?  * are unable to urinate  ?  * develop bright red blood that does not stop  ?  * experience severe pain or swelling  ?  ?  ?  And of course, please contact our office with any concerns or questions [phone].  ?    "

## 2024-03-27 NOTE — LETTER
"3/27/2024       RE: Rhonda Trotter  [address]  [place] [address]     Dear Colleague,    Thank you for referring your patient, Rhonda Trotter, to the [facility] at [facility]. Please see a copy of my visit note below.    March 27, 2024    Return visit    Patient returns today for follow up.  Has not had any UTI in the past 3 months, She denies any changes in her health since last visit.    /78   Pulse 66   Ht 1.651 m (5' 5\")   Wt 101.2 kg (223 lb)   LMP 08/01/2004   SpO2 96%   BMI 37.11 kg/m    She is comfortable, in no distress, non-labored breathing.  Abdomen is soft, non-tender, non-distended.  Normal external female genitalia.  Negative CST.  Pelvic exam is remarkable for some hard stool in the rectal vault    Cystoscopy Note: After informed consent was obtained patient was prepped and draped in the standard fashion.  The flexible cystoscope was inserted into a normal appearing urethral meatus.  The urothelium was carefully examined and there was a tiny erythematous spot in the posterior bladder but no tumors, masses, stones, foreign bodies, or other urothelial abnormalities noted.  Bilateral ureteral orifices were noted in the normal orthotopic position, 2 ureteral orifices notes on her left  The cystoscope was retroflexed and the bladder neck was unremarkable.  The urethra was carefully examined upon removing the cystoscope and was unremarkable.  Patient tolerated the procedure without complications noted.            A/P: 73 year old F with (N39.0) Recurrent UTI, CT with bilateral duplicated system.    No UTIs 3 months since starting fiber, increasing water and using estrogen cream    Unclear the small area of erythema but no other symptoms so will monitor for now    RTC 6 months to see Susanna to reassess bladder symptoms and UTI    10 minutes were spent today on the date of the encounter in " reviewing the EMR, direct patient care, coordination of care and documentation in addition to the cystoscopy procedure    Rebecca Shepherd MD MPH  (she/her/hers)   of Urology  [facility]

## 2024-03-27 NOTE — NURSING NOTE
Chief Complaint   Patient presents with    vaginal atrophy     Patient here today for Cystoscopy         Prior to the start of the procedure and with procedural staff participation, I verbally confirmed the patient s identity using two indicators, relevant allergies, that the procedure was appropriate and matched the consent or emergent situation, and that the correct equipment/implants were available. Immediately prior to starting the procedure re-confirmed the patient s name, procedure, and site/side. I have wiped the patient off with the povidone-Iodine solution, draped them,  used Lidocaine hydrochloride jelly, and instilled sterile water into the bladder. (The Joint Commission universal protocol was followed.)  Yes    Sedation (Moderate or Deep): None      EMIL Polanco

## 2024-04-02 ENCOUNTER — MYC MEDICAL ADVICE (OUTPATIENT)
Dept: FAMILY MEDICINE | Facility: CLINIC | Age: 74
End: 2024-04-02
Payer: COMMERCIAL

## 2024-04-10 ENCOUNTER — TELEPHONE (OUTPATIENT)
Dept: PULMONOLOGY | Facility: CLINIC | Age: 74
End: 2024-04-10
Payer: COMMERCIAL

## 2024-04-10 NOTE — TELEPHONE ENCOUNTER
[facility] Call Center    Phone Message    May a detailed message be left on voicemail: yes     Reason for Call: Form or Letter   Type or form/letter needing completion: Oxygen order forms for [employer]  Provider: Brian  Date form needed: ASAP  Once completed: Fax form to: number provided on form    [place]ale [place] [facility] were faxed to Dr. Torres's office several days ago, will be re-faxing these forms today to [phone]. Gale would like a call back if these forms are not received for any reason: [phone].    Action Taken: Other: Pulm    Travel Screening: Not Applicable

## 2024-04-10 NOTE — TELEPHONE ENCOUNTER
Forms received. LVM for Gale with update that Dr Torres is not in clinic to sign forms until 4/19/24. Can provide verbal if needed.    Jaleel Leyva RN

## 2024-04-11 NOTE — TELEPHONE ENCOUNTER
Gale would like to know if any other providers in the office would be able to sign before then? Please call back to advise. Thank you.

## 2024-04-16 NOTE — TELEPHONE ENCOUNTER
Gale called to check if provider is at the clinic today. Writer explained that provider is not in today but will be in tomorrow. Please sign portable oxygen rx and send back to [facility]. Thanks!

## 2024-04-18 NOTE — TELEPHONE ENCOUNTER
Call received from Gale to check on the status of these forms--if these forms cannot be faxed back to their office today (4/18/24), Gale is requesting that someone from Dr. Torres's office give her a call? She can be reached at [phone].

## 2024-04-18 NOTE — TELEPHONE ENCOUNTER
Spoke to Gale from [employer]. Reiterated that Dr Torres will be in clinic on 4/19/24 at which time [employer] order can be signed. Reiterated again that there is no other provider that can sign order as patient has not been seen by a different ordering provider. Fax will be sent once signed on 4/19/24.     Jaleel Leyva RN

## 2024-04-19 NOTE — TELEPHONE ENCOUNTER
[employer] POC order signed by Dr Torres and faxed to [employer]. RN called [employer] rep (Hannah) and provided update that order has been faxed.    Jaleel Leyva RN

## 2024-04-29 DIAGNOSIS — I10 BENIGN ESSENTIAL HYPERTENSION: ICD-10-CM

## 2024-04-29 DIAGNOSIS — M81.0 OSTEOPOROSIS, UNSPECIFIED OSTEOPOROSIS TYPE, UNSPECIFIED PATHOLOGICAL FRACTURE PRESENCE: ICD-10-CM

## 2024-04-29 DIAGNOSIS — E78.5 HYPERLIPIDEMIA LDL GOAL <130: ICD-10-CM

## 2024-04-29 DIAGNOSIS — E03.9 HYPOTHYROIDISM, UNSPECIFIED TYPE: ICD-10-CM

## 2024-04-30 RX ORDER — SIMVASTATIN 20 MG
20 TABLET ORAL AT BEDTIME
Qty: 90 TABLET | Refills: 3 | Status: SHIPPED | OUTPATIENT
Start: 2024-04-30

## 2024-04-30 RX ORDER — ALENDRONATE SODIUM 70 MG/1
TABLET ORAL
Qty: 12 TABLET | Refills: 3 | Status: SHIPPED | OUTPATIENT
Start: 2024-04-30

## 2024-04-30 RX ORDER — LEVOTHYROXINE SODIUM 100 UG/1
100 TABLET ORAL DAILY
Qty: 90 TABLET | Refills: 3 | Status: SHIPPED | OUTPATIENT
Start: 2024-04-30

## 2024-04-30 RX ORDER — VALSARTAN 160 MG/1
160 TABLET ORAL DAILY
Qty: 90 TABLET | Refills: 3 | Status: SHIPPED | OUTPATIENT
Start: 2024-04-30

## 2024-07-01 ENCOUNTER — MYC MEDICAL ADVICE (OUTPATIENT)
Dept: FAMILY MEDICINE | Facility: CLINIC | Age: 74
End: 2024-07-01
Payer: COMMERCIAL

## 2024-07-02 NOTE — TELEPHONE ENCOUNTER
Writer replied to patient via [employer].  MALICK MoralesN, RN (she/her)  [facility] RN

## 2024-07-15 ENCOUNTER — ANCILLARY PROCEDURE (OUTPATIENT)
Dept: BONE DENSITY | Facility: CLINIC | Age: 74
End: 2024-07-15
Attending: NURSE PRACTITIONER
Payer: COMMERCIAL

## 2024-07-15 DIAGNOSIS — M81.0 OSTEOPOROSIS, UNSPECIFIED OSTEOPOROSIS TYPE, UNSPECIFIED PATHOLOGICAL FRACTURE PRESENCE: ICD-10-CM

## 2024-07-15 PROCEDURE — 77080 DXA BONE DENSITY AXIAL: CPT | Mod: TC | Performed by: PHYSICIAN ASSISTANT

## 2024-08-20 ENCOUNTER — MYC MEDICAL ADVICE (OUTPATIENT)
Dept: FAMILY MEDICINE | Facility: CLINIC | Age: 74
End: 2024-08-20
Payer: COMMERCIAL

## 2024-08-20 NOTE — TELEPHONE ENCOUNTER
Erica - see pt MyChart regarding LMNT electrolyte replacement. Upon initial review, no contraindication based on most recent BMP. Advisement appreciated.    MAKSIM Kulkarni, MALICKN, RN (she/her)  [facility] RN

## 2024-08-22 ENCOUNTER — DOCUMENTATION ONLY (OUTPATIENT)
Dept: PULMONOLOGY | Facility: CLINIC | Age: 74
End: 2024-08-22
Payer: COMMERCIAL

## 2024-08-22 DIAGNOSIS — J84.10 PULMONARY FIBROSIS, UNSPECIFIED (H): Primary | ICD-10-CM

## 2024-08-26 NOTE — TELEPHONE ENCOUNTER
Writer responded via [employer].  MALICK RendonN, RN-BC  [facility]

## 2024-10-02 ENCOUNTER — TELEPHONE (OUTPATIENT)
Dept: UROLOGY | Facility: CLINIC | Age: 74
End: 2024-10-02

## 2024-10-02 ENCOUNTER — OFFICE VISIT (OUTPATIENT)
Dept: UROLOGY | Facility: CLINIC | Age: 74
End: 2024-10-02
Payer: COMMERCIAL

## 2024-10-02 VITALS
SYSTOLIC BLOOD PRESSURE: 114 MMHG | OXYGEN SATURATION: 96 % | HEIGHT: 65 IN | HEART RATE: 68 BPM | DIASTOLIC BLOOD PRESSURE: 74 MMHG | WEIGHT: 215 LBS | BODY MASS INDEX: 35.82 KG/M2

## 2024-10-02 DIAGNOSIS — N90.89 VULVAR IRRITATION: ICD-10-CM

## 2024-10-02 DIAGNOSIS — M62.89 PELVIC FLOOR DYSFUNCTION: ICD-10-CM

## 2024-10-02 DIAGNOSIS — N39.46 MIXED INCONTINENCE: ICD-10-CM

## 2024-10-02 DIAGNOSIS — R39.15 URINARY URGENCY: ICD-10-CM

## 2024-10-02 DIAGNOSIS — N39.0 RECURRENT UTI: Primary | ICD-10-CM

## 2024-10-02 PROCEDURE — 99214 OFFICE O/P EST MOD 30 MIN: CPT | Performed by: PHYSICIAN ASSISTANT

## 2024-10-02 RX ORDER — CLOBETASOL PROPIONATE 0.5 MG/G
OINTMENT TOPICAL
Qty: 60 G | Refills: 3 | Status: SHIPPED | OUTPATIENT
Start: 2024-10-03 | End: 2024-11-03

## 2024-10-02 RX ORDER — ESTRADIOL 0.1 MG/G
2 CREAM VAGINAL
Qty: 40 G | Refills: 3 | Status: SHIPPED | OUTPATIENT
Start: 2024-10-03 | End: 2024-11-03

## 2024-10-02 NOTE — TELEPHONE ENCOUNTER
Called patient and she would like to repeat cystoscopy. Patient was transferred to scheduling.     Macey Roche LPN

## 2024-10-02 NOTE — LETTER
"10/2/2024       RE: Rhonda Trotter  [address]  [place] [address]     Dear Colleague,    Thank you for referring your patient, Rhonda Trotter, to the [facility] at [facility]. Please see a copy of my visit note below.    Urology Clinic      Name: Rhonda Trotter    MRN: [id number]   YOB: 1950  Accompanied at today's visit by:self                 Chief Complaint:   Hx of UTIs          History of Present Illness:   October 2, 2024    HISTORY:   We have been following 74 year old Rhonda Trotter for hx of UTIs, vaginal atrophy, concern for lichen sclerosus based on hx and exam findings, LO, urinary urgency, constipation, pelvic floor dysfunction, cystocele, rectocele. Last seen on 3/27/24 with Dr. Shepherd for cysto and noted \"small area of erythema\" and  2 ureteral orifices notes on her left, but otherwise unremarkable. Since was doing well, was advised to continue estrogen cream and monitor for now. Here today for follow-up. Continues to be UTI free. Continue to use estrogen cream and clobetasol ointment. Continues fiber supplement daily and occasional miralax. Has lost 50lbs and is very pleased by this. Denies gross hematuria. Following with pulmonology for fibrosis. Patient voices no other concerns at this time.            Allergies:     Allergies   Allergen Reactions     No Known Drug Allergy             Medications:     Current Outpatient Medications   Medication Sig Dispense Refill     albuterol (PROAIR HFA/PROVENTIL HFA/VENTOLIN HFA) 108 (90 Base) MCG/ACT inhaler Inhale 2 puffs into the lungs every 6 hours as needed for shortness of breath, wheezing or cough 18 g 3     alendronate (FOSAMAX) 70 MG tablet TAKE 1 TABLET EVERY 7 DAYS WITH 8 OUNCES OF WATER 30 MINUTES BEFORE BREAKFAST AND REMAIN UPRIGHT DURING THIS TIME 12 tablet 3     calcium carbonate (TUMS) 500 MG chewable tablet Take 1-2 " chew tab by mouth daily as needed for heartburn       Calcium-Phosphorus-Vitamin D (CALCIUM GUMMIES) 250-100-500 MG-MG-UNIT CHEW Take 2 tablets by mouth daily as needed       clobetasol (TEMOVATE) 0.05 % external ointment Apply topically twice a week Apply along vulva 2x/week at night. 60 g 3     Cobalamine Combinations (B-12) 100-5000 MCG SUBL Place 1 tablet under the tongue daily as needed 50 tablet 0     diphenhydrAMINE (BENADRYL) 25 MG capsule Take 1 capsule (25 mg) by mouth nightly as needed for sleep 56 capsule      estradiol (ESTRACE) 0.1 MG/GM vaginal cream Place 2 g vaginally twice a week 40 g 3     fluticasone (FLONASE) 50 MCG/ACT spray USE 2 SPRAYS IN EACH NOSTRIL DAILY 16 g PRN     levothyroxine (SYNTHROID/LEVOTHROID) 100 MCG tablet Take 1 tablet (100 mcg) by mouth daily 90 tablet 3     MAGNESIUM PO Take by mouth daily Magnesium 7       multivitamin, therapeutic with minerals (THERA-VIT-M) TABS Take 1 tablet by mouth daily 30 each 0     simvastatin (ZOCOR) 20 MG tablet Take 1 tablet (20 mg) by mouth at bedtime 90 tablet 3     valsartan (DIOVAN) 160 MG tablet Take 1 tablet (160 mg) by mouth daily 90 tablet 3     No current facility-administered medications for this visit.               Past  Surgical History:     Past Surgical History:   Procedure Laterality Date     ARTHROPLASTY KNEE Left 02/03/2016    Procedure: ARTHROPLASTY KNEE;  Surgeon: Abdiel Ledesma MD;  Location:  OR     ARTHROPLASTY KNEE Right 10/11/2016    Procedure: ARTHROPLASTY KNEE;  Surgeon: Abdiel Ledesma MD;  Location:  OR     BIOPSY  08/2014    breast biopsy was negative     COLONOSCOPY  2013    every 5 years starting at age 52     COLONOSCOPY N/A 03/13/2024    Procedure: Colonoscopy;  Surgeon: Alisha Nowak MD;  Location:  GI     CYSTOSCOPY       orif Left     wrist     SURGICAL HISTORY OF -       wisdom teeth extraction             Physical Exam:     Vitals:    10/02/24 1027   BP: 114/74   Pulse: 68   SpO2:  "96%   Weight: 97.5 kg (215 lb)   Height: 1.651 m (5' 5\")     PSYCH: NAD  EYES: EOMI  NEURO: AAO x3    LABS:   Creatinine   Date Value Ref Range Status   03/04/2024 0.87 0.51 - 0.95 mg/dL Final   11/09/2020 0.79 0.52 - 1.04 mg/dL Final     Cystoscopy Note 3/27/24: After informed consent was obtained patient was prepped and draped in the standard fashion.  The flexible cystoscope was inserted into a normal appearing urethral meatus.  The urothelium was carefully examined and there was a tiny erythematous spot in the posterior bladder but no tumors, masses, stones, foreign bodies, or other urothelial abnormalities noted.  Bilateral ureteral orifices were noted in the normal orthotopic position, 2 ureteral orifices notes on her left  The cystoscope was retroflexed and the bladder neck was unremarkable.  The urethra was carefully examined upon removing the cystoscope and was unremarkable.  Patient tolerated the procedure without complications noted.      Narrative & Impression   EXAM: CT UROGRAM WO and W CONTRAST  LOCATION: [facility]  DATE: 2/16/2024  .  INDICATION: Recurrent UTI.  COMPARISON: No prior abdominal imaging is available. CT chest 04/21/2023  TECHNIQUE: CT scan of the abdomen and pelvis using urogram technique with pre contrast, post contrast, and delayed images. Multiplanar reformats were obtained. Dose reduction techniques were used.   CONTRAST: 90 ml Isovue     FINDINGS:   LOWER CHEST: Similar bibasilar subpleural septal thickening, mild bronchiectasis and patchy groundglass.     HEPATOBILIARY: Left hepatic lobe cyst. No calcified gallstone.     PANCREAS: Normal.     SPLEEN: Normal.     ADRENAL GLANDS: Normal.     RIGHT KIDNEY/URETER: No stone, solid renal mass or hydronephrosis. Duplicated collecting system. Right upper pole moiety ureter is not well-opacified limiting the evaluation. Otherwise, no abnormality on excretory imaging.     LEFT KIDNEY/URETER: No stone solid renal " mass or hydronephrosis. Duplicated collecting system. No abnormality on excretory imaging.     BLADDER: Unremarkable.     BOWEL: No obstruction. Mild mural fat deposition in the terminal ileum likely reflects sequelae of chronic inflammation. A few scattered nonenlarged lymph nodes are favored reactive.     LYMPH NODES: No enlarged lymph node by size criteria.     VASCULATURE: Nonaneurysmal abdominal aorta. Mild atherosclerosis.     PELVIC ORGANS: Normal.     MUSCULOSKELETAL: Tiny fat-containing umbilical hernia. Subacute appearing left rib fractures. Similar severe T12 compression deformity. Degenerative changes of the thoracolumbar spine.                                                                      IMPRESSION:  1.  Bilateral duplicated renal collecting systems.  2.  Otherwise, no evidence of an upper urothelial tract lesion or renal calculus.  3.  Similar lower lobe pulmonary interstitial lung disease/fibrosis.            Assessment and Plan:   74 year old is a pleasant female who has UTIs, vaginal atrophy, concern for lichen sclerosus based on hx and exam findings, LO, urinary urgency, constipation, pelvic floor dysfunction, cystocele, rectocele.    Plan:  -  continue estrogen cream; renewed today  - continue steroid ointment; renewed today.  - contact clinic if develops gross hematuria or UTIs in the future.   - will discuss with Dr. Shepherd if needs repeat cysto.  - follow-up in 6 months for symptom check and annual exam for lichen.   - continue to monitor prolapse and incontinence as not bothered by these sx.   - After discussing the assessment and plan with patient, patient verbalizes understanding and agrees to the above plan. All questions answered.     Other orders as below:  No orders of the defined types were placed in this encounter.    13 minutes spent on the date of the encounter doing chart review, review of Dr. Shepherd's note, review of imaging. review of test results, interpretation of tests,  patient visit and documentation.      Deirdre Quintana PA-C  Urology  October 2, 2024      Patient Care Team:  Corina Lara NP as PCP - General  Brian, Abdiel Huber MD as MD (Critical Care)  Corina Lara NP as Assigned PCP  Blu Burger EP as Cardiac Rehabilitation Therapist  Deirdre Quintana PA-C as Physician Assistant  Rebecca Shepherd MD as Assigned Surgical Provider  Brian, Abdiel Huber MD as Assigned Heart and Vascular Provider           Again, thank you for allowing me to participate in the care of your patient.      Sincerely,    DEIRDRE QUINTANA PA-C

## 2024-10-02 NOTE — PROGRESS NOTES
"Urology Clinic      Name: Rhonda Trotter    MRN: [id number]   YOB: 1950  Accompanied at today's visit by:self                 Chief Complaint:   Hx of UTIs          History of Present Illness:   October 2, 2024    HISTORY:   We have been following 74 year old Rhonda Trotter for hx of UTIs, vaginal atrophy, concern for lichen sclerosus based on hx and exam findings, LO, urinary urgency, constipation, pelvic floor dysfunction, cystocele, rectocele. Last seen on 3/27/24 with Dr. Shepherd for cysto and noted \"small area of erythema\" and  2 ureteral orifices notes on her left, but otherwise unremarkable. Since was doing well, was advised to continue estrogen cream and monitor for now. Here today for follow-up. Continues to be UTI free. Continue to use estrogen cream and clobetasol ointment. Continues fiber supplement daily and occasional miralax. Has lost 50lbs and is very pleased by this. Denies gross hematuria. Following with pulmonology for fibrosis. Patient voices no other concerns at this time.            Allergies:     Allergies   Allergen Reactions    No Known Drug Allergy             Medications:     Current Outpatient Medications   Medication Sig Dispense Refill    albuterol (PROAIR HFA/PROVENTIL HFA/VENTOLIN HFA) 108 (90 Base) MCG/ACT inhaler Inhale 2 puffs into the lungs every 6 hours as needed for shortness of breath, wheezing or cough 18 g 3    alendronate (FOSAMAX) 70 MG tablet TAKE 1 TABLET EVERY 7 DAYS WITH 8 OUNCES OF WATER 30 MINUTES BEFORE BREAKFAST AND REMAIN UPRIGHT DURING THIS TIME 12 tablet 3    calcium carbonate (TUMS) 500 MG chewable tablet Take 1-2 chew tab by mouth daily as needed for heartburn      Calcium-Phosphorus-Vitamin D (CALCIUM GUMMIES) 250-100-500 MG-MG-UNIT CHEW Take 2 tablets by mouth daily as needed      clobetasol (TEMOVATE) 0.05 % external ointment Apply topically twice a week Apply along vulva 2x/week at night. 60 g 3    Cobalamine Combinations (B-12) 100-5000 " "MCG SUBL Place 1 tablet under the tongue daily as needed 50 tablet 0    diphenhydrAMINE (BENADRYL) 25 MG capsule Take 1 capsule (25 mg) by mouth nightly as needed for sleep 56 capsule     estradiol (ESTRACE) 0.1 MG/GM vaginal cream Place 2 g vaginally twice a week 40 g 3    fluticasone (FLONASE) 50 MCG/ACT spray USE 2 SPRAYS IN EACH NOSTRIL DAILY 16 g PRN    levothyroxine (SYNTHROID/LEVOTHROID) 100 MCG tablet Take 1 tablet (100 mcg) by mouth daily 90 tablet 3    MAGNESIUM PO Take by mouth daily Magnesium 7      multivitamin, therapeutic with minerals (THERA-VIT-M) TABS Take 1 tablet by mouth daily 30 each 0    simvastatin (ZOCOR) 20 MG tablet Take 1 tablet (20 mg) by mouth at bedtime 90 tablet 3    valsartan (DIOVAN) 160 MG tablet Take 1 tablet (160 mg) by mouth daily 90 tablet 3     No current facility-administered medications for this visit.               Past  Surgical History:     Past Surgical History:   Procedure Laterality Date    ARTHROPLASTY KNEE Left 02/03/2016    Procedure: ARTHROPLASTY KNEE;  Surgeon: Abdiel Ledesma MD;  Location:  OR    ARTHROPLASTY KNEE Right 10/11/2016    Procedure: ARTHROPLASTY KNEE;  Surgeon: Abdiel Ledesma MD;  Location:  OR    BIOPSY  08/2014    breast biopsy was negative    COLONOSCOPY  2013    every 5 years starting at age 52    COLONOSCOPY N/A 03/13/2024    Procedure: Colonoscopy;  Surgeon: Alisha Nowak MD;  Location:  GI    CYSTOSCOPY      orif Left     wrist    SURGICAL HISTORY OF -       wisdom teeth extraction             Physical Exam:     Vitals:    10/02/24 1027   BP: 114/74   Pulse: 68   SpO2: 96%   Weight: 97.5 kg (215 lb)   Height: 1.651 m (5' 5\")     PSYCH: NAD  EYES: EOMI  NEURO: AAO x3    LABS:   Creatinine   Date Value Ref Range Status   03/04/2024 0.87 0.51 - 0.95 mg/dL Final   11/09/2020 0.79 0.52 - 1.04 mg/dL Final     Cystoscopy Note 3/27/24: After informed consent was obtained patient was prepped and draped in the standard " fashion.  The flexible cystoscope was inserted into a normal appearing urethral meatus.  The urothelium was carefully examined and there was a tiny erythematous spot in the posterior bladder but no tumors, masses, stones, foreign bodies, or other urothelial abnormalities noted.  Bilateral ureteral orifices were noted in the normal orthotopic position, 2 ureteral orifices notes on her left  The cystoscope was retroflexed and the bladder neck was unremarkable.  The urethra was carefully examined upon removing the cystoscope and was unremarkable.  Patient tolerated the procedure without complications noted.      Narrative & Impression   EXAM: CT UROGRAM WO and W CONTRAST  LOCATION: [facility]  DATE: 2/16/2024  .  INDICATION: Recurrent UTI.  COMPARISON: No prior abdominal imaging is available. CT chest 04/21/2023  TECHNIQUE: CT scan of the abdomen and pelvis using urogram technique with pre contrast, post contrast, and delayed images. Multiplanar reformats were obtained. Dose reduction techniques were used.   CONTRAST: 90 ml Isovue     FINDINGS:   LOWER CHEST: Similar bibasilar subpleural septal thickening, mild bronchiectasis and patchy groundglass.     HEPATOBILIARY: Left hepatic lobe cyst. No calcified gallstone.     PANCREAS: Normal.     SPLEEN: Normal.     ADRENAL GLANDS: Normal.     RIGHT KIDNEY/URETER: No stone, solid renal mass or hydronephrosis. Duplicated collecting system. Right upper pole moiety ureter is not well-opacified limiting the evaluation. Otherwise, no abnormality on excretory imaging.     LEFT KIDNEY/URETER: No stone solid renal mass or hydronephrosis. Duplicated collecting system. No abnormality on excretory imaging.     BLADDER: Unremarkable.     BOWEL: No obstruction. Mild mural fat deposition in the terminal ileum likely reflects sequelae of chronic inflammation. A few scattered nonenlarged lymph nodes are favored reactive.     LYMPH NODES: No enlarged lymph node by  size criteria.     VASCULATURE: Nonaneurysmal abdominal aorta. Mild atherosclerosis.     PELVIC ORGANS: Normal.     MUSCULOSKELETAL: Tiny fat-containing umbilical hernia. Subacute appearing left rib fractures. Similar severe T12 compression deformity. Degenerative changes of the thoracolumbar spine.                                                                      IMPRESSION:  1.  Bilateral duplicated renal collecting systems.  2.  Otherwise, no evidence of an upper urothelial tract lesion or renal calculus.  3.  Similar lower lobe pulmonary interstitial lung disease/fibrosis.            Assessment and Plan:   74 year old is a pleasant female who has UTIs, vaginal atrophy, concern for lichen sclerosus based on hx and exam findings, LO, urinary urgency, constipation, pelvic floor dysfunction, cystocele, rectocele.    Plan:  -  continue estrogen cream; renewed today  - continue steroid ointment; renewed today.  - contact clinic if develops gross hematuria or UTIs in the future.   - will discuss with Dr. Shepherd if needs repeat cysto.  - follow-up in 6 months for symptom check and annual exam for lichen.   - continue to monitor prolapse and incontinence as not bothered by these sx.   - After discussing the assessment and plan with patient, patient verbalizes understanding and agrees to the above plan. All questions answered.     Other orders as below:  No orders of the defined types were placed in this encounter.    13 minutes spent on the date of the encounter doing chart review, review of Dr. Shepherd's note, review of imaging. review of test results, interpretation of tests, patient visit and documentation.      Esther Quintana PA-C  Urology  October 2, 2024      Patient Care Team:  Corina Lara NP as PCP - Abdiel Banda MD as MD (Critical Care)  Corina Lara NP as Assigned PCP  Blu Burger EP as Cardiac Rehabilitation Therapist  Esther Quintana PA-C as Physician Assistant  Cora  Rebecca Griffin MD as Assigned Surgical Provider  Abdiel Torres MD as Assigned Heart and Vascular Provider

## 2024-10-02 NOTE — TELEPHONE ENCOUNTER
----- Message from DEIRDRE YODER sent at 10/2/2024 11:17 AM CDT -----  Please notify patient of Dr. Shepherd's recommendations. If she wants to repeat cysto please get her scheduled for this with UA/UC 10 days prior to ensure no infection day of cysto. If does not want to undergo cysto then to keep follow-up appointment with me in 6 months    jeff  ----- Message -----  From: Rebecca Shepherd MD  Sent: 10/2/2024  11:11 AM CDT  To: Deirdre Yoder PA-C    Not concerned but if she wants to repeat to assess resolution/stability that is okay  ----- Message -----  From: Deirdre Yoder PA-C  Sent: 10/2/2024  11:05 AM CDT  To: Rebecca Shepherd MD    Patient seen today and doing great. No further Utis and no gross hematuria. She had a cysto with you in 3/2024 and you noted an erythematous spot. Patient concerned if she needs another cysto. Since she is doing well, I said to follow-up in 6 months with me for follow-up. Would you recommend repeating cysto now or 1 year from last cysto just to reevaluate or wait until her UTIs come back or has rUTIs.     jeff

## 2024-10-02 NOTE — NURSING NOTE
Chief Complaint   Patient presents with    Mixed incontinence     Patient here today for 6 month follow up       Patient state she is doing well  Patient here after 6 month check up          EMIL Polanco

## 2024-11-03 ENCOUNTER — MYC REFILL (OUTPATIENT)
Dept: UROLOGY | Facility: CLINIC | Age: 74
End: 2024-11-03
Payer: COMMERCIAL

## 2024-11-03 DIAGNOSIS — N39.0 RECURRENT UTI: ICD-10-CM

## 2024-11-03 DIAGNOSIS — N90.89 VULVAR IRRITATION: ICD-10-CM

## 2024-11-04 RX ORDER — ESTRADIOL 0.1 MG/G
2 CREAM VAGINAL
Qty: 40 G | Refills: 3 | Status: SHIPPED | OUTPATIENT
Start: 2024-11-04

## 2024-11-04 RX ORDER — CLOBETASOL PROPIONATE 0.5 MG/G
OINTMENT TOPICAL
Qty: 60 G | Refills: 3 | Status: SHIPPED | OUTPATIENT
Start: 2024-11-04

## 2024-11-15 ENCOUNTER — MYC MEDICAL ADVICE (OUTPATIENT)
Dept: FAMILY MEDICINE | Facility: CLINIC | Age: 74
End: 2024-11-15
Payer: COMMERCIAL

## 2024-12-05 ENCOUNTER — HOSPITAL ENCOUNTER (OUTPATIENT)
Dept: MAMMOGRAPHY | Facility: CLINIC | Age: 74
Discharge: HOME | End: 2024-12-05
Attending: NURSE PRACTITIONER
Payer: COMMERCIAL

## 2024-12-05 DIAGNOSIS — Z12.31 VISIT FOR SCREENING MAMMOGRAM: ICD-10-CM

## 2024-12-05 PROCEDURE — 77063 BREAST TOMOSYNTHESIS BI: CPT

## 2024-12-13 SDOH — HEALTH STABILITY: PHYSICAL HEALTH: ON AVERAGE, HOW MANY DAYS PER WEEK DO YOU ENGAGE IN MODERATE TO STRENUOUS EXERCISE (LIKE A BRISK WALK)?: 3 DAYS

## 2024-12-13 ASSESSMENT — SOCIAL DETERMINANTS OF HEALTH (SDOH): HOW OFTEN DO YOU GET TOGETHER WITH FRIENDS OR RELATIVES?: THREE TIMES A WEEK

## 2024-12-16 ENCOUNTER — OFFICE VISIT (OUTPATIENT)
Dept: FAMILY MEDICINE | Facility: CLINIC | Age: 74
End: 2024-12-16
Attending: NURSE PRACTITIONER
Payer: COMMERCIAL

## 2024-12-16 VITALS
HEIGHT: 65 IN | HEART RATE: 60 BPM | TEMPERATURE: 97 F | WEIGHT: 217.4 LBS | SYSTOLIC BLOOD PRESSURE: 136 MMHG | OXYGEN SATURATION: 97 % | DIASTOLIC BLOOD PRESSURE: 82 MMHG | BODY MASS INDEX: 36.22 KG/M2 | RESPIRATION RATE: 18 BRPM

## 2024-12-16 DIAGNOSIS — E03.9 HYPOTHYROIDISM, UNSPECIFIED TYPE: ICD-10-CM

## 2024-12-16 DIAGNOSIS — M54.50 BILATERAL LOW BACK PAIN WITHOUT SCIATICA, UNSPECIFIED CHRONICITY: ICD-10-CM

## 2024-12-16 DIAGNOSIS — I10 BENIGN ESSENTIAL HYPERTENSION: ICD-10-CM

## 2024-12-16 DIAGNOSIS — Z00.00 ENCOUNTER FOR MEDICARE ANNUAL WELLNESS EXAM: Primary | ICD-10-CM

## 2024-12-16 DIAGNOSIS — M81.0 OSTEOPOROSIS, UNSPECIFIED OSTEOPOROSIS TYPE, UNSPECIFIED PATHOLOGICAL FRACTURE PRESENCE: ICD-10-CM

## 2024-12-16 DIAGNOSIS — E78.5 HYPERLIPIDEMIA LDL GOAL <130: ICD-10-CM

## 2024-12-16 LAB
ALBUMIN SERPL BCG-MCNC: 3.8 G/DL (ref 3.5–5.2)
ALP SERPL-CCNC: 140 U/L (ref 40–150)
ALT SERPL W P-5'-P-CCNC: 24 U/L (ref 0–50)
ANION GAP SERPL CALCULATED.3IONS-SCNC: 7 MMOL/L (ref 7–15)
AST SERPL W P-5'-P-CCNC: 39 U/L (ref 0–45)
BILIRUB SERPL-MCNC: 0.5 MG/DL
BUN SERPL-MCNC: 12.7 MG/DL (ref 8–23)
CALCIUM SERPL-MCNC: 9.2 MG/DL (ref 8.8–10.4)
CHLORIDE SERPL-SCNC: 99 MMOL/L (ref 98–107)
CHOLEST SERPL-MCNC: 184 MG/DL
CREAT SERPL-MCNC: 0.84 MG/DL (ref 0.51–0.95)
EGFRCR SERPLBLD CKD-EPI 2021: 73 ML/MIN/1.73M2
FASTING STATUS PATIENT QL REPORTED: YES
FASTING STATUS PATIENT QL REPORTED: YES
GLUCOSE SERPL-MCNC: 87 MG/DL (ref 70–99)
HCO3 SERPL-SCNC: 28 MMOL/L (ref 22–29)
HDLC SERPL-MCNC: 75 MG/DL
LDLC SERPL CALC-MCNC: 94 MG/DL
NONHDLC SERPL-MCNC: 109 MG/DL
POTASSIUM SERPL-SCNC: 4.6 MMOL/L (ref 3.4–5.3)
PROT SERPL-MCNC: 7.6 G/DL (ref 6.4–8.3)
SODIUM SERPL-SCNC: 134 MMOL/L (ref 135–145)
TRIGL SERPL-MCNC: 75 MG/DL
TSH SERPL DL<=0.005 MIU/L-ACNC: 1.95 UIU/ML (ref 0.3–4.2)

## 2024-12-16 PROCEDURE — 84443 ASSAY THYROID STIM HORMONE: CPT | Performed by: NURSE PRACTITIONER

## 2024-12-16 PROCEDURE — 99214 OFFICE O/P EST MOD 30 MIN: CPT | Mod: 25 | Performed by: NURSE PRACTITIONER

## 2024-12-16 PROCEDURE — G0439 PPPS, SUBSEQ VISIT: HCPCS | Performed by: NURSE PRACTITIONER

## 2024-12-16 PROCEDURE — 80053 COMPREHEN METABOLIC PANEL: CPT | Performed by: NURSE PRACTITIONER

## 2024-12-16 PROCEDURE — 36415 COLL VENOUS BLD VENIPUNCTURE: CPT | Performed by: NURSE PRACTITIONER

## 2024-12-16 PROCEDURE — 80061 LIPID PANEL: CPT | Performed by: NURSE PRACTITIONER

## 2024-12-16 RX ORDER — ALENDRONATE SODIUM 70 MG/1
TABLET ORAL
Qty: 12 TABLET | Refills: 4 | Status: SHIPPED | OUTPATIENT
Start: 2024-12-16

## 2024-12-16 RX ORDER — VALSARTAN 160 MG/1
160 TABLET ORAL DAILY
Qty: 90 TABLET | Refills: 4 | Status: SHIPPED | OUTPATIENT
Start: 2024-12-16

## 2024-12-16 RX ORDER — SIMVASTATIN 20 MG
20 TABLET ORAL AT BEDTIME
Qty: 90 TABLET | Refills: 4 | Status: SHIPPED | OUTPATIENT
Start: 2024-12-16

## 2024-12-16 RX ORDER — LEVOTHYROXINE SODIUM 100 UG/1
100 TABLET ORAL DAILY
Qty: 90 TABLET | Refills: 4 | Status: SHIPPED | OUTPATIENT
Start: 2024-12-16

## 2024-12-16 ASSESSMENT — PAIN SCALES - GENERAL: PAINLEVEL_OUTOF10: NO PAIN (1)

## 2024-12-16 NOTE — PROGRESS NOTES
"Preventive Care Visit  [facility]  Corina Lara, NP, Nurse Practitioner - Family  Dec 16, 2024      Assessment & Plan     (Z00.00) Encounter for Medicare annual wellness exam  (primary encounter diagnosis)  Comment:   Plan:     (E78.5) Hyperlipidemia LDL goal <130  Comment: stable  Plan: simvastatin (ZOCOR) 20 MG tablet, Comprehensive        metabolic panel (BMP + Alb, Alk Phos, ALT, AST,        Total. Bili, TP), Lipid panel reflex to direct         LDL Fasting        The current medical regimen is effective;  continue present plan and medications.     (E03.9) Hypothyroidism, unspecified type  Comment: stable  Plan: TSH WITH FREE T4 REFLEX, levothyroxine         (SYNTHROID/LEVOTHROID) 100 MCG tablet        The current medical regimen is effective;  continue present plan and medications.     (M81.0) Osteoporosis, unspecified osteoporosis type, unspecified pathological fracture presence  Comment: stable  Plan: alendronate (FOSAMAX) 70 MG tablet        The current medical regimen is effective;  continue present plan and medications.  Repeat DEXA 7/26.    (I10) Benign essential hypertension  Comment: at goal  Plan: valsartan (DIOVAN) 160 MG tablet, Comprehensive        metabolic panel (BMP + Alb, Alk Phos, ALT, AST,        Total. Bili, TP)        The current medical regimen is effective;  continue present plan and medications.     (M54.50) Bilateral low back pain without sciatica, unspecified chronicity  Comment:   Plan: Physical Therapy  Referral        Will refer to PT.             BMI  Estimated body mass index is 36.18 kg/m  as calculated from the following:    Height as of this encounter: 1.651 m (5' 5\").    Weight as of this encounter: 98.6 kg (217 lb 6.4 oz).   Weight management plan: Discussed healthy diet and exercise guidelines    Counseling  Appropriate preventive services were addressed with this patient via screening, questionnaire, or discussion as appropriate for " fall prevention, nutrition, physical activity, Tobacco-use cessation, social engagement, weight loss and cognition.  Checklist reviewing preventive services available has been given to the patient.  Reviewed patient's diet, addressing concerns and/or questions.   She is at risk for lack of exercise and has been provided with information to increase physical activity for the benefit of her well-being.   The patient was provided with written information regarding signs of hearing loss.           Brandon Ellis is a 74 year old, presenting for the following:  Annual Visit and History of Present Illness (PT has questions./Cold- lingering for 1 month, cough(dry), possible sinus drainage, post nasal drip(white drip)./PT for low back referral)            History of Present Illness        Her blood pressure has been well-controlled on her current medication and she denies any side effects.     Her breathing is stable.  She recently completed pulmonary rehab.      She has chronic intermittent low back pain.  She did have PT many years ago,  but has not been good about doing them at home.        Health Care Directive  Patient has a Health Care Directive on file  Advance care planning document is on file and is current.      12/13/2024   General Health   How would you rate your overall physical health? Good   Feel stress (tense, anxious, or unable to sleep) Only a little      (!) STRESS CONCERN      12/13/2024   Nutrition   Diet: Regular (no restrictions)            12/13/2024   Exercise   Days per week of moderate/strenous exercise 3 days            12/13/2024   Social Factors   Frequency of gathering with friends or relatives Three times a week   Worry food won't last until get money to buy more No   Food not last or not have enough money for food? No   Do you have housing? (Housing is defined as stable permanent housing and does not include staying ouside in a car, in a tent, in an abandoned building, in an  overnight shelter, or couch-surfing.) Yes   Are you worried about losing your housing? No   Lack of transportation? No   Unable to get utilities (heat,electricity)? No            12/13/2024   Fall Risk   Fallen 2 or more times in the past year? No    Trouble with walking or balance? No        Patient-reported          12/13/2024   Activities of Daily Living- Home Safety   Needs help with the following daily activites None of the above   Safety concerns in the home None of the above            12/13/2024   Dental   Dentist two times every year? Yes            12/13/2024   Hearing Screening   Hearing concerns? (!) I NEED TO ASK PEOPLE TO SPEAK UP OR REPEAT THEMSELVES.    (!) IT'S HARD TO FOLLOW A CONVERSATION IN A NOISY RESTAURANT OR CROWDED ROOM.       Multiple values from one day are sorted in reverse-chronological order         12/13/2024   Driving Risk Screening   Patient/family members have concerns about driving No            12/13/2024   General Alertness/Fatigue Screening   Have you been more tired than usual lately? No            12/13/2024   Urinary Incontinence Screening   Bothered by leaking urine in past 6 months No            12/13/2024   TB Screening   Were you born outside of the [place]? No            Today's PHQ-2 Score:       12/16/2024    12:22 AM   PHQ-2 ( 1999 [employer])   Q1: Little interest or pleasure in doing things 0    Q2: Feeling down, depressed or hopeless 0    PHQ-2 Score 0    Q1: Little interest or pleasure in doing things Not at all   Q2: Feeling down, depressed or hopeless Not at all   PHQ-2 Score 0       Patient-reported           12/13/2024   Substance Use   Alcohol more than 3/day or more than 7/wk No   Do you have a current opioid prescription? No   How severe/bad is pain from 1 to 10? 1/10   Do you use any other substances recreationally? No        Social History     Tobacco Use    Smoking status: Never     Passive exposure: Never    Smokeless tobacco: Never   Vaping Use    Vaping  status: Never Used   Substance Use Topics    Alcohol use: Not Currently     Comment: very light use    Drug use: No           12/1/2023   LAST FHS-7 RESULTS   1st degree relative breast or ovarian cancer No   Any relative bilateral breast cancer No   Any male have breast cancer No   Any ONE woman have BOTH breast AND ovarian cancer No   Any woman with breast cancer before 50yrs No   2 or more relatives with breast AND/OR ovarian cancer No   2 or more relatives with breast AND/OR bowel cancer No               ASCVD Risk   The 10-year ASCVD risk score (Nicolette LAW, et al., 2019) is: 16.2%    Values used to calculate the score:      Age: 74 years      Sex: Female      Is Non- : No      Diabetic: No      Tobacco smoker: No      Systolic Blood Pressure: 136 mmHg      Is BP treated: Yes      HDL Cholesterol: 70 mg/dL      Total Cholesterol: 162 mg/dL            Reviewed and updated as needed this visit by Provider   Tobacco  Allergies  Meds  Problems  Med Hx  Surg Hx  Fam Hx              Current providers sharing in care for this patient include:  Patient Care Team:  Corina Lara NP as PCP - General  Abdiel Torres MD as MD (Critical Care)  Corina Lara NP as Assigned PCP  Esther Quintana PA-C as Physician Assistant  Esther Quintana PA-C as Assigned Surgical Provider  Abdiel Torres MD as Assigned Pulmonology Provider    The following health maintenance items are reviewed in Epic and correct as of today:  Health Maintenance   Topic Date Due    LIPID  12/08/2024    TSH W/FREE T4 REFLEX  12/08/2024    BMP  03/04/2025    ANNUAL REVIEW OF HM ORDERS  03/04/2025    MEDICARE ANNUAL WELLNESS VISIT  12/16/2025    FALL RISK ASSESSMENT  12/16/2025    MAMMO SCREENING  12/05/2026    GLUCOSE  03/04/2027    COLORECTAL CANCER SCREENING  03/13/2029    ADVANCE CARE PLANNING  12/16/2029    DTAP/TDAP/TD IMMUNIZATION (3 - Td or Tdap) 12/13/2033    DEXA  07/15/2039  "   HEPATITIS C SCREENING  Completed    PHQ-2 (once per calendar year)  Completed    INFLUENZA VACCINE  Completed    Pneumococcal Vaccine: 65+ Years  Completed    ZOSTER IMMUNIZATION  Completed    RSV VACCINE  Completed    COVID-19 Vaccine  Completed    HPV IMMUNIZATION  Aged Out    MENINGITIS IMMUNIZATION  Aged Out    RSV MONOCLONAL ANTIBODY  Aged Out            Objective    Exam  /82 (BP Location: Right arm, Patient Position: Sitting, Cuff Size: Adult Regular)   Pulse 60   Temp 97  F (36.1  C) (Temporal)   Resp 18   Ht 1.651 m (5' 5\")   Wt 98.6 kg (217 lb 6.4 oz)   LMP 08/01/2004   SpO2 97%   BMI 36.18 kg/m     Estimated body mass index is 36.18 kg/m  as calculated from the following:    Height as of this encounter: 1.651 m (5' 5\").    Weight as of this encounter: 98.6 kg (217 lb 6.4 oz).    Physical Exam  GENERAL: alert and no distress  EYES: Eyes grossly normal to inspection, PERRL and conjunctivae and sclerae normal  HENT: ear canals and TM's normal, nose and mouth without ulcers or lesions  NECK: no adenopathy, no asymmetry, masses, or scars  RESP: lungs clear to auscultation - no rales, rhonchi or wheezes  CV: regular rate and rhythm, normal S1 S2, no S3 or S4, no murmur, click or rub, no peripheral edema  ABDOMEN: soft, nontender, no hepatosplenomegaly, no masses and bowel sounds normal  MS: no gross musculoskeletal defects noted, no edema  SKIN: no suspicious lesions or rashes  NEURO: Normal strength and tone, mentation intact and speech normal  PSYCH: mentation appears normal, affect normal/bright         12/16/2024   Mini Cog   Clock Draw Score 2 Normal   3 Item Recall 3 objects recalled   Mini Cog Total Score 5                 Signed Electronically by: Corina Lara NP    "

## 2024-12-16 NOTE — PATIENT INSTRUCTIONS
Patient Education   Preventive Care Advice   This is general advice given by our system to help you stay healthy. However, your care team may have specific advice just for you. Please talk to your care team about your preventive care needs.  Nutrition  Eat 5 or more servings of fruits and vegetables each day.  Try wheat bread, brown rice and whole grain pasta (instead of white bread, rice, and pasta).  Get enough calcium and vitamin D. Check the label on foods and aim for 100% of the RDA (recommended daily allowance).  Lifestyle  Exercise at least 150 minutes each week  (30 minutes a day, 5 days a week).  Do muscle strengthening activities 2 days a week. These help control your weight and prevent disease.  No smoking.  Wear sunscreen to prevent skin cancer.  Have a dental exam and cleaning every 6 months.  Yearly exams  See your health care team every year to talk about:  Any changes in your health.  Any medicines your care team has prescribed.  Preventive care, family planning, and ways to prevent chronic diseases.  Shots (vaccines)   HPV shots (up to age 26), if you've never had them before.  Hepatitis B shots (up to age 59), if you've never had them before.  COVID-19 shot: Get this shot when it's due.  Flu shot: Get a flu shot every year.  Tetanus shot: Get a tetanus shot every 10 years.  Pneumococcal, hepatitis A, and RSV shots: Ask your care team if you need these based on your risk.  Shingles shot (for age 50 and up)  General health tests  Diabetes screening:  Starting at age 35, Get screened for diabetes at least every 3 years.  If you are younger than age 35, ask your care team if you should be screened for diabetes.  Cholesterol test: At age 39, start having a cholesterol test every 5 years, or more often if advised.  Bone density scan (DEXA): At age 50, ask your care team if you should have this scan for osteoporosis (brittle bones).  Hepatitis C: Get tested at least once in your life.  STIs (sexually  transmitted infections)  Before age 24: Ask your care team if you should be screened for STIs.  After age 24: Get screened for STIs if you're at risk. You are at risk for STIs (including HIV) if:  You are sexually active with more than one person.  You don't use condoms every time.  You or a partner was diagnosed with a sexually transmitted infection.  If you are at risk for HIV, ask about PrEP medicine to prevent HIV.  Get tested for HIV at least once in your life, whether you are at risk for HIV or not.  Cancer screening tests  Cervical cancer screening: If you have a cervix, begin getting regular cervical cancer screening tests starting at age 21.  Breast cancer scan (mammogram): If you've ever had breasts, begin having regular mammograms starting at age 40. This is a scan to check for breast cancer.  Colon cancer screening: It is important to start screening for colon cancer at age 45.  Have a colonoscopy test every 10 years (or more often if you're at risk) Or, ask your provider about stool tests like a FIT test every year or Cologuard test every 3 years.  To learn more about your testing options, visit:   .  For help making a decision, visit:   https://bit.ly/et70890.  Prostate cancer screening test: If you have a prostate, ask your care team if a prostate cancer screening test (PSA) at age 55 is right for you.  Lung cancer screening: If you are a current or former smoker ages 50 to 80, ask your care team if ongoing lung cancer screenings are right for you.  For informational purposes only. Not to replace the advice of your health care provider. Copyright   2023 [facility] [employer]. All rights reserved. Clinically reviewed by the [facility] Transitions Program. [employer] 945955 - REV 01/24.  Hearing Loss: Care Instructions  Overview     Hearing loss is a sudden or slow decrease in how well you hear. It can range from slight to profound. Permanent hearing loss can occur with aging. It also can  happen when you are exposed long-term to loud noise. Examples include listening to loud music, riding motorcycles, or being around other loud machines.  Hearing loss can affect your work and home life. It can make you feel lonely or depressed. You may feel that you have lost your independence. But hearing aids and other devices can help you hear better and feel connected to others.  Follow-up care is a key part of your treatment and safety. Be sure to make and go to all appointments, and call your doctor if you are having problems. It's also a good idea to know your test results and keep a list of the medicines you take.  How can you care for yourself at home?  Avoid loud noises whenever possible. This helps keep your hearing from getting worse.  Always wear hearing protection around loud noises.  Wear a hearing aid as directed.  A professional can help you pick a hearing aid that will work best for you.  You can also get hearing aids over the counter for mild to moderate hearing loss.  Have hearing tests as your doctor suggests. They can show whether your hearing has changed. Your hearing aid may need to be adjusted.  Use other devices as needed. These may include:  Telephone amplifiers and hearing aids that can connect to a television, stereo, radio, or microphone.  Devices that use lights or vibrations. These alert you to the doorbell, a ringing telephone, or a baby monitor.  Television closed-captioning. This shows the words at the bottom of the screen. Most new TVs can do this.  TTY (text telephone). This lets you type messages back and forth on the telephone instead of talking or listening. These devices are also called TDD. When messages are typed on the keyboard, they are sent over the phone line to a receiving TTY. The message is shown on a monitor.  Use text messaging, social media, and email if it is hard for you to communicate by telephone.  Try to learn a listening technique called speechreading. It is  "not lipreading. You pay attention to people's gestures, expressions, posture, and tone of voice. These clues can help you understand what a person is saying. Face the person you are talking to, and have them face you. Make sure the lighting is good. You need to see the other person's face clearly.  Think about counseling if you need help to adjust to your hearing loss.  When should you call for help?  Watch closely for changes in your health, and be sure to contact your doctor if:    You think your hearing is getting worse.     You have new symptoms, such as dizziness or nausea.   Where can you learn more?  Go to https://www.[employer].net/patiented  Enter R798 in the search box to learn more about \"Hearing Loss: Care Instructions.\"  Current as of: September 27, 2023  Content Version: 14.2 2024 [facility] [employer].   Care instructions adapted under license by your healthcare professional. If you have questions about a medical condition or this instruction, always ask your healthcare professional. [employer] disclaims any warranty or liability for your use of this information.       "

## 2025-01-08 ENCOUNTER — THERAPY VISIT (OUTPATIENT)
Dept: PHYSICAL THERAPY | Facility: CLINIC | Age: 75
End: 2025-01-08
Attending: NURSE PRACTITIONER
Payer: COMMERCIAL

## 2025-01-08 DIAGNOSIS — G89.29 CHRONIC RIGHT-SIDED LOW BACK PAIN WITHOUT SCIATICA: Primary | ICD-10-CM

## 2025-01-08 DIAGNOSIS — M54.50 CHRONIC RIGHT-SIDED LOW BACK PAIN WITHOUT SCIATICA: Primary | ICD-10-CM

## 2025-01-08 DIAGNOSIS — M54.50 BILATERAL LOW BACK PAIN WITHOUT SCIATICA, UNSPECIFIED CHRONICITY: ICD-10-CM

## 2025-01-08 PROCEDURE — 97110 THERAPEUTIC EXERCISES: CPT | Mod: GP | Performed by: PHYSICAL THERAPIST

## 2025-01-08 PROCEDURE — 97161 PT EVAL LOW COMPLEX 20 MIN: CPT | Mod: GP | Performed by: PHYSICAL THERAPIST

## 2025-01-08 NOTE — PROGRESS NOTES
PHYSICAL THERAPY EVALUATION  Type of Visit: Evaluation       Fall Risk Screen:  Fall screen completed by: PT  Have you fallen 2 or more times in the past year?: No  Have you fallen and had an injury in the past year?: No  Is patient a fall risk?: No    Subjective   December 2024 she bent forward and slightly twisted and had R LBP. She had already been having some back pain before that but worsened after that. Past history of 2017 stumbled on last step and fell and had LBP.  She would like a HEP and help performing it correctly.        Presenting condition or subjective complaint: (Patient-Rptd) I would like to know if certain exercises will lessen the discomfort and need to make sure I'm doing the exercises correctly.  Date of onset: 12/16/25 (MD order date)    Relevant medical history: (Patient-Rptd) Arthritis; High blood pressure; Osteoporosis; Overweight   Dates & types of surgery: (Patient-Rptd) 2/16 TKR left knee, 11/16 TKR right knee, 6/17 left wrist for compression fracture    Prior diagnostic imaging/testing results: (Patient-Rptd) X-ray; Bone scan     Prior therapy history for the same diagnosis, illness or injury: (Patient-Rptd) Yes (Patient-Rptd) 2018, had therapy for strengthening bones    Prior Level of Function  Transfers: Independent  Ambulation: Independent  ADL: Independent  IADL:     Living Environment  Social support: (Patient-Rptd) Alone   Type of home: (Patient-Rptd) Apartment/condo   Stairs to enter the home: (Patient-Rptd) No       Ramp: (Patient-Rptd) No   Stairs inside the home: (Patient-Rptd) No       Help at home: (Patient-Rptd) None  Equipment owned:       Employment: (Patient-Rptd) No    Hobbies/Interests:      Patient goals for therapy: (Patient-Rptd) I would like to keep doing basic activities without needing to sit for a short time, then start again.    Pain assessment: See objective evaluation for additional pain details     Objective   LUMBAR SPINE EVALUATION  PAIN: Pain Level at  Rest: 0/10  Pain Level with Use: 3-4/10  Pain Location:  R LBP  Pain Quality: Aching and Dull  Pain Frequency: intermittent  Pain is Worst: daytime  Pain is Exacerbated By: bending, standing>20', walking>15'  Pain is Relieved By: rest and while walking placing hands in the small of her back  Pain Progression: Unchanged    Gait  Assistive device:  none  Deviation:  WNL    Posture/alignment  Sitting:  FH, rounded shoulders, decreased lordosis  Standing:  WNL    Lumbar AROM  Flexion:  hands to ankles  Extension:  75%  Right sideglide:  wnl  Left sideglide:  wnl      Slump test  Right  (-)  Left  (-)    SLR test  Right  (-)  Left  (-)    Repeated movement testing  Symptoms prior to test movements: No pain  Repeated ANDREA: no pain, increased motion  BRITTA: no pain, increased motion      Spinal Mobility  Mild hypomobility L1-L5  Other tests  Flexibility: Significant decreased B quad length--in prone knees flex ~95 degrees      Assessment & Plan   CLINICAL IMPRESSIONS  Medical Diagnosis: Bilateral low back pain without sciatica    Treatment Diagnosis: LBP   Impression/Assessment: Patient is a 74 year old female with R LBP complaints.  The following significant findings have been identified: Pain, Decreased ROM/flexibility, Decreased joint mobility, Decreased activity tolerance, and Impaired posture. These impairments interfere with their ability to perform self care tasks, recreational activities, household chores, driving , household mobility, and community mobility as compared to previous level of function.     Clinical Decision Making (Complexity):  Clinical Presentation: Stable/Uncomplicated  Clinical Presentation Rationale: based on medical and personal factors listed in PT evaluation  Clinical Decision Making (Complexity): Low complexity    PLAN OF CARE  Treatment Interventions:  Interventions: Manual Therapy, Neuromuscular Re-education, Therapeutic Activity, Therapeutic Exercise, Self-Care/Home Management    Long Term  Goals     PT Goal 1  Goal Identifier: ambulation  Goal Description: Patient able to walk for 30 minutes with 0/10 pain.  Rationale: to maximize safety and independence within the home;to maximize safety and independence within the community  Goal Progress: new goal  Target Date: 03/05/25      Frequency of Treatment: 1x/week  Duration of Treatment: 8 weeks    Recommended Referrals to Other Professionals:   Education Assessment:   Learner/Method: Patient;No Barriers to Learning    Risks and benefits of evaluation/treatment have been explained.   Patient/Family/caregiver agrees with Plan of Care.     Evaluation Time:     PT Eval, Low Complexity Minutes (11761): 15       Signing Clinician: Nikia Saleh PT        [facility]                                                                                   OUTPATIENT PHYSICAL THERAPY      PLAN OF TREATMENT FOR OUTPATIENT REHABILITATION   Patient's Last Name, First Name, Rhonda Iglesias YOB: 1950   Provider's Name   [facility]   Medical Record No.  [id number]     Onset Date: 12/16/25 (MD order date)  Start of Care Date: 01/08/25     Medical Diagnosis:  Bilateral low back pain without sciatica      PT Treatment Diagnosis:  LBP Plan of Treatment  Frequency/Duration: 1x/week/ 8 weeks    Certification date from 01/08/25 to 03/05/25         See note for plan of treatment details and functional goals     Nikia Saleh PT                         I CERTIFY THE NEED FOR THESE SERVICES FURNISHED UNDER        THIS PLAN OF TREATMENT AND WHILE UNDER MY CARE     (Physician attestation of this document indicates review and certification of the therapy plan).              Referring Provider:  Corina Lara    Initial Assessment  See Epic Evaluation- Start of Care Date: 01/08/25

## 2025-01-15 ENCOUNTER — THERAPY VISIT (OUTPATIENT)
Dept: PHYSICAL THERAPY | Facility: CLINIC | Age: 75
End: 2025-01-15
Attending: NURSE PRACTITIONER
Payer: COMMERCIAL

## 2025-01-15 DIAGNOSIS — G89.29 CHRONIC RIGHT-SIDED LOW BACK PAIN WITHOUT SCIATICA: Primary | ICD-10-CM

## 2025-01-15 DIAGNOSIS — M54.50 CHRONIC RIGHT-SIDED LOW BACK PAIN WITHOUT SCIATICA: Primary | ICD-10-CM

## 2025-01-15 PROCEDURE — 97530 THERAPEUTIC ACTIVITIES: CPT | Mod: GP | Performed by: PHYSICAL THERAPIST

## 2025-01-15 PROCEDURE — 97110 THERAPEUTIC EXERCISES: CPT | Mod: GP | Performed by: PHYSICAL THERAPIST

## 2025-01-28 ENCOUNTER — MYC MEDICAL ADVICE (OUTPATIENT)
Dept: FAMILY MEDICINE | Facility: CLINIC | Age: 75
End: 2025-01-28
Payer: COMMERCIAL

## 2025-01-28 ENCOUNTER — MYC REFILL (OUTPATIENT)
Dept: FAMILY MEDICINE | Facility: CLINIC | Age: 75
End: 2025-01-28
Payer: COMMERCIAL

## 2025-01-28 DIAGNOSIS — I10 BENIGN ESSENTIAL HYPERTENSION: ICD-10-CM

## 2025-01-28 DIAGNOSIS — E03.9 HYPOTHYROIDISM, UNSPECIFIED TYPE: ICD-10-CM

## 2025-01-28 DIAGNOSIS — E78.5 HYPERLIPIDEMIA LDL GOAL <130: ICD-10-CM

## 2025-01-28 DIAGNOSIS — M81.0 OSTEOPOROSIS, UNSPECIFIED OSTEOPOROSIS TYPE, UNSPECIFIED PATHOLOGICAL FRACTURE PRESENCE: ICD-10-CM

## 2025-01-29 ENCOUNTER — THERAPY VISIT (OUTPATIENT)
Dept: PHYSICAL THERAPY | Facility: CLINIC | Age: 75
End: 2025-01-29
Payer: COMMERCIAL

## 2025-01-29 DIAGNOSIS — M54.50 CHRONIC RIGHT-SIDED LOW BACK PAIN WITHOUT SCIATICA: Primary | ICD-10-CM

## 2025-01-29 DIAGNOSIS — G89.29 CHRONIC RIGHT-SIDED LOW BACK PAIN WITHOUT SCIATICA: Primary | ICD-10-CM

## 2025-01-29 PROCEDURE — 97110 THERAPEUTIC EXERCISES: CPT | Mod: GP | Performed by: PHYSICAL THERAPIST

## 2025-01-29 RX ORDER — SIMVASTATIN 20 MG
20 TABLET ORAL AT BEDTIME
Qty: 90 TABLET | Refills: 2 | Status: SHIPPED | OUTPATIENT
Start: 2025-01-29

## 2025-01-29 RX ORDER — ALENDRONATE SODIUM 70 MG/1
TABLET ORAL
Qty: 12 TABLET | Refills: 2 | Status: SHIPPED | OUTPATIENT
Start: 2025-01-29

## 2025-01-29 RX ORDER — VALSARTAN 160 MG/1
160 TABLET ORAL DAILY
Qty: 90 TABLET | Refills: 2 | Status: SHIPPED | OUTPATIENT
Start: 2025-01-29

## 2025-01-29 RX ORDER — LEVOTHYROXINE SODIUM 100 UG/1
100 TABLET ORAL DAILY
Qty: 90 TABLET | Refills: 2 | Status: SHIPPED | OUTPATIENT
Start: 2025-01-29

## 2025-01-30 NOTE — TELEPHONE ENCOUNTER
Writer responded via [employer].  MALICK RendonN, RN-BC  [facility]

## 2025-02-05 ENCOUNTER — THERAPY VISIT (OUTPATIENT)
Dept: PHYSICAL THERAPY | Facility: CLINIC | Age: 75
End: 2025-02-05
Payer: COMMERCIAL

## 2025-02-05 DIAGNOSIS — G89.29 CHRONIC RIGHT-SIDED LOW BACK PAIN WITHOUT SCIATICA: Primary | ICD-10-CM

## 2025-02-05 DIAGNOSIS — M54.50 CHRONIC RIGHT-SIDED LOW BACK PAIN WITHOUT SCIATICA: Primary | ICD-10-CM

## 2025-02-05 PROCEDURE — 97110 THERAPEUTIC EXERCISES: CPT | Mod: GP | Performed by: PHYSICAL THERAPIST

## 2025-02-12 ENCOUNTER — THERAPY VISIT (OUTPATIENT)
Dept: PHYSICAL THERAPY | Facility: CLINIC | Age: 75
End: 2025-02-12
Payer: COMMERCIAL

## 2025-02-12 DIAGNOSIS — M54.50 CHRONIC RIGHT-SIDED LOW BACK PAIN WITHOUT SCIATICA: Primary | ICD-10-CM

## 2025-02-12 DIAGNOSIS — G89.29 CHRONIC RIGHT-SIDED LOW BACK PAIN WITHOUT SCIATICA: Primary | ICD-10-CM

## 2025-02-12 PROCEDURE — 97110 THERAPEUTIC EXERCISES: CPT | Mod: GP | Performed by: PHYSICAL THERAPIST

## 2025-02-12 NOTE — PROGRESS NOTES
DISCHARGE  Reason for Discharge: Patient has met all goals.    Equipment Issued: theraband    Discharge Plan: Patient to continue home program.    Referring Provider:  Corina Lara       02/12/25 0500   Appointment Info   Signing clinician's name / credentials Nikia Saleh PT   Total/Authorized Visits Plan 8   Visits Used 5   Medical Diagnosis Bilateral low back pain without sciatica   PT Tx Diagnosis LBP   Progress Note/Certification   Start of Care Date 01/08/25   Onset of illness/injury or Date of Surgery 12/16/25  (MD order date)   Therapy Frequency 1x/week   Predicted Duration 8 weeks   Certification date from 01/08/25   Certification date to 03/05/25   Progress Note Due Date 03/08/25   Progress Note Completed Date 01/08/25   PT Goal 1   Goal Identifier ambulation   Goal Description Patient able to walk for 30 minutes with 0/10 pain.   Rationale to maximize safety and independence within the home;to maximize safety and independence within the community   Goal Progress Can walk 30 minutes without pain.   Target Date 03/05/25   Date Met 02/12/25   Subjective Report   Subjective Report Bending, standing, and walking are all improved. Can walk 30 minutes on treadmill without pain. Overall doing well and feels ready to continue with her HEP on her own.   Objective Measures   Objective Measures Objective Measure 1;Objective Measure 2   Objective Measure 1   Objective Measure LAROM   Details flex=hands to ankles, ext=75%   Objective Measure 2   Objective Measure functional testing   Details Able to squat to floor to retrieve object and stand up again without pain; able to perform good 's bow   Treatment Interventions (PT)   Interventions Therapeutic Procedure/Exercise;Therapeutic Activity   Therapeutic Procedure/Exercise   Therapeutic Procedures: strength, endurance, ROM, flexibility minutes (05001) 40   Ther Proc 2 nu step   Ther Proc 2 - Details x5' level 5 with arms   PTRx Ther Proc 1 Standing  "Extension at Counter Supported   PTRx Ther Proc 1 - Details HEP   PTRx Ther Proc 2 Bridging #1   PTRx Ther Proc 2 - Details 2 sets of 15 1x/day   PTRx Ther Proc 3 Supine Abdominal Exercise #7A (Arm Extension with Legs at 90/90)   PTRx Ther Proc 3 - Details 25 seconds 3x; 1x/day --incr time as able   PTRx Ther Proc 4 Shoulder Theraband Low Row/Pulldown   PTRx Ther Proc 4 - Details BTB x20 incr to 30 1x/day   PTRx Ther Proc 5 Balance Single Leg Stance Supported and Unsupported   PTRx Ther Proc 5 - Details 20\"x3 B 1x/day   PTRx Ther Proc 6 Side Stepping With Theraband   PTRx Ther Proc 6 - Details RTB at ankles 10'x8 1x/day   PTRx Ther Proc 7 Standing Quadriceps Stretch using Chair   PTRx Ther Proc 7 - Details 30\"x2 B 1-2x/day   PTRx Ther Proc 8 Star Exercise   PTRx Ther Proc 8 - Details front side back x 10 ea B with UE support 1x/day --eventually do without UE support   Skilled Intervention vc, vsc for form, progressed HEP   Patient Response/Progress good   PTRx Ther Proc 9 Sit to Stand   PTRx Ther Proc 9 - Details 2 sets of 5 incr to 2 sets of 10 as able 1x/day   Therapeutic Activity   Therapeutic Activities: dynamic activities to improve functional performance minutes (17695) 2   Therapeutic Activities Ther Act 2   Ther Act 1 partial squat for lifting   Ther Act 1 - Details pt able to demonstrate with good form   Ther Act 2 golfer's lift   Ther Act 2 - Details HEP   PTRx Ther Act 1 Body Mechanics - [place]   PTRx Ther Act 1 - Details reviewed   Skilled Intervention vc, vsc for body mechanics   Patient Response/Progress pt demonstrates understanding   Education   Learner/Method Patient;No Barriers to Learning   Plan   Home program PTRX HEP   Updates to plan of care d/c to HEP   Total Session Time   Timed Code Treatment Minutes 42   Total Treatment Time (sum of timed and untimed services) 42     "

## 2025-02-14 ENCOUNTER — TRANSFERRED RECORDS (OUTPATIENT)
Dept: HEALTH INFORMATION MANAGEMENT | Facility: CLINIC | Age: 75
End: 2025-02-14
Payer: COMMERCIAL

## 2025-03-06 ENCOUNTER — TRANSFERRED RECORDS (OUTPATIENT)
Dept: HEALTH INFORMATION MANAGEMENT | Facility: CLINIC | Age: 75
End: 2025-03-06
Payer: COMMERCIAL

## 2025-03-17 ENCOUNTER — ALLIED HEALTH/NURSE VISIT (OUTPATIENT)
Dept: UROLOGY | Facility: CLINIC | Age: 75
End: 2025-03-17
Payer: COMMERCIAL

## 2025-03-17 DIAGNOSIS — N39.0 RECURRENT UTI: Primary | ICD-10-CM

## 2025-03-17 PROCEDURE — 81003 URINALYSIS AUTO W/O SCOPE: CPT | Mod: QW

## 2025-03-17 PROCEDURE — 99207 PR NO CHARGE NURSE ONLY: CPT

## 2025-03-17 NOTE — PROGRESS NOTES
Rhonda Trotter comes into clinic today for Suspected UTI  at the request of Esther Quintana PAC Ordering Provider for [facility].  This service provided today was under the supervising provider of the julianne FELICIANO CNP , who was available if needed.    Patient presents to clinic for catheterized urine sample. Catheterization procedure was explained to patient and patient was in agreement. Patient's allergies were confirmed. Patient's urethra was then cleansed, using sterile 4x4 gauze coated in iodine. Using sterile field technique a well-lubricated with sterile jelly 12 Ethiopian straight-tip catheter was gently inserted into urethra. Clear-yellow urine return was noted. First bit of stream was discarded in graduate container, then a sterile sample was collected in a sterile cup. This was then capped and sent to lab for analysis and culture. Patient informed if results are positive in 2-3 days we will call. If negative patient is aware we typically do not call.   residual by catheter 150    Alycia Duffy LPN

## 2025-03-20 LAB — BACTERIA UR CULT: NORMAL

## 2025-03-24 ENCOUNTER — TRANSFERRED RECORDS (OUTPATIENT)
Dept: HEALTH INFORMATION MANAGEMENT | Facility: CLINIC | Age: 75
End: 2025-03-24
Payer: COMMERCIAL

## 2025-03-26 ENCOUNTER — OFFICE VISIT (OUTPATIENT)
Dept: UROLOGY | Facility: CLINIC | Age: 75
End: 2025-03-26
Payer: COMMERCIAL

## 2025-03-26 VITALS — HEART RATE: 70 BPM | DIASTOLIC BLOOD PRESSURE: 82 MMHG | OXYGEN SATURATION: 95 % | SYSTOLIC BLOOD PRESSURE: 146 MMHG

## 2025-03-26 DIAGNOSIS — N32.9 LESION OF BLADDER: ICD-10-CM

## 2025-03-26 DIAGNOSIS — N39.0 RECURRENT UTI: Primary | ICD-10-CM

## 2025-03-26 DIAGNOSIS — N39.46 MIXED INCONTINENCE: ICD-10-CM

## 2025-03-26 LAB
ALBUMIN UR-MCNC: NEGATIVE MG/DL
APPEARANCE UR: CLEAR
BILIRUB UR QL STRIP: NEGATIVE
COLOR UR AUTO: YELLOW
GLUCOSE UR STRIP-MCNC: NEGATIVE MG/DL
HGB UR QL STRIP: ABNORMAL
KETONES UR STRIP-MCNC: NEGATIVE MG/DL
LEUKOCYTE ESTERASE UR QL STRIP: ABNORMAL
NITRATE UR QL: NEGATIVE
PH UR STRIP: 7 [PH] (ref 5–7)
SP GR UR STRIP: 1.01 (ref 1–1.03)
UROBILINOGEN UR STRIP-ACNC: 0.2 E.U./DL

## 2025-03-26 PROCEDURE — 99213 OFFICE O/P EST LOW 20 MIN: CPT | Mod: 25 | Performed by: UROLOGY

## 2025-03-26 PROCEDURE — 3077F SYST BP >= 140 MM HG: CPT | Performed by: UROLOGY

## 2025-03-26 PROCEDURE — 3079F DIAST BP 80-89 MM HG: CPT | Performed by: UROLOGY

## 2025-03-26 PROCEDURE — 81003 URINALYSIS AUTO W/O SCOPE: CPT | Mod: QW | Performed by: UROLOGY

## 2025-03-26 PROCEDURE — 52000 CYSTOURETHROSCOPY: CPT | Performed by: UROLOGY

## 2025-03-26 RX ORDER — LIDOCAINE HYDROCHLORIDE 20 MG/ML
JELLY TOPICAL ONCE
Status: COMPLETED | OUTPATIENT
Start: 2025-03-26 | End: 2025-03-26

## 2025-03-26 RX ADMIN — LIDOCAINE HYDROCHLORIDE: 20 JELLY TOPICAL at 13:25

## 2025-03-26 NOTE — NURSING NOTE
Pt has signed the consent form stating that we will be doing a CYSTOSCOPY (with or without stent removal) today, and that it is the correct procedure. I verbally confirmed the patient s identity using two indicators, relevant allergies, and that the correct equipment was available. Post-op information given to the pt as needed at check-out.         5mL 2% lidocaine hydrochloride Urojet instilled into urethra.    NDC# [address]-6  Lot #: [id number]  Expiration Date:  7-26 D. Romel QURESHI

## 2025-03-26 NOTE — PROGRESS NOTES
March 26, 2025    Return visit    Patient returns today for follow up.  She denies any changes in her health since last visit.    BP (!) 146/82   Pulse 70   LMP 08/01/2004   SpO2 95%   She is comfortable, in no distress, non-labored breathing.  Abdomen is soft, non-tender, non-distended.  Normal external female genitalia.  Negative CST.  Pelvic exam is unremarkable.    Cystoscopy Note: After informed consent was obtained patient was prepped and draped in the standard fashion.  The flexible cystoscope was inserted into a normal appearing urethral meatus.  The urothelium was carefully examined and there is a stable erythematous lesion noted in the posterior wall.  There were no tumors, masses, stones, foreign bodies, or other urothelial abnormalities noted.  Bilateral ureteral orifices were noted in the normal orthotopic position and both effluxed clear urine.  The cystoscope was retroflexed and the bladder neck was unremarkable.  The urethra was carefully examined upon removing the cystoscope and was unremarkable.  Patient tolerated the procedure without complications noted.          A/P: 74 year old F with mixed incontinence, history of UTI and lesion of bladder that is stable    Unclear the etiology and she is inclined to monitor given that she is asymptomatic at this time    Urine cytology and if negative plan to monitor, consider repeat cystoscopy in 6 months    If UTIs and/or hematuria recur then will recommend biopsy    20 minutes were spent today on the date of the encounter in reviewing the EMR, direct patient care, coordination of care and documentation in addition to the cystoscopy procedure    Rebecca Shepherd MD MPH  (she/her/hers)   of Urology  [facility]  Patient Care Team:  Corina Lara NP as PCP - Abdiel Banda MD as MD (Critical Care)  Corina Lara NP as Assigned PCP  Esther Quintana PA-C as Physician Assistant  Lilian  Esther MATHIAS PA-C as Assigned Surgical Provider  Abdiel Torres MD as Assigned Pulmonology Provider

## 2025-03-26 NOTE — PATIENT INSTRUCTIONS
"Websites with free information:    [employer] patient website: www.voicesforpfd.org    Total Control Program: www.totalcontrolprogram.com    It was a pleasure meeting with you today.  Thank you for allowing me and my team the privilege of caring for you today.  YOU are the reason we are here, and I truly hope we provided you with the excellent service you deserve.  Please let us know if there is anything else we can do for you so that we can be sure you are leaving completely satisfied with your care experience.    AFTER YOUR CYSTOSCOPY  ?  ?  You have just completed a cystoscopy, or \"cysto\", which allowed your physician to learn more about your bladder (or to remove a stent placed after surgery). We suggest that you continue to avoid caffeine, fruit juice, and alcohol for the next 24 hours, however, you are encouraged to return to your normal activities.  ?  ?  A few things that are considered normal after your cystoscopy:  ?  * small amount of bleeding (or spotting) that clears within the next 24 hours  ?  * slight burning sensation with urination  ?  * sensation of needing to void (urinate) more frequently  ?  * the feeling of \"air\" in your urine  ?  * mild discomfort that is relieved with Tylenol    * bladder spasms  ?  ?  ?  Please contact our office promptly if you:  ?  * develop a fever above 101 degrees  ?  * are unable to urinate  ?  * develop bright red blood that does not stop  ?  * experience severe pain or swelling  ?  ?  ?  And of course, please contact our office with any concerns or questions [phone].  "

## 2025-03-26 NOTE — LETTER
3/26/2025       RE: Rhonda Trotter  [address]  [facility] [address]     Dear Colleague,    Thank you for referring your patient, Rhonda Trotter, to the [facility] [place] at [facility]. Please see a copy of my visit note below.    March 26, 2025    Return visit    Patient returns today for follow up.  She denies any changes in her health since last visit.    BP (!) 146/82   Pulse 70   LMP 08/01/2004   SpO2 95%   She is comfortable, in no distress, non-labored breathing.  Abdomen is soft, non-tender, non-distended.  Normal external female genitalia.  Negative CST.  Pelvic exam is unremarkable.    Cystoscopy Note: After informed consent was obtained patient was prepped and draped in the standard fashion.  The flexible cystoscope was inserted into a normal appearing urethral meatus.  The urothelium was carefully examined and there is a stable erythematous lesion noted in the posterior wall.  There were no tumors, masses, stones, foreign bodies, or other urothelial abnormalities noted.  Bilateral ureteral orifices were noted in the normal orthotopic position and both effluxed clear urine.  The cystoscope was retroflexed and the bladder neck was unremarkable.  The urethra was carefully examined upon removing the cystoscope and was unremarkable.  Patient tolerated the procedure without complications noted.          A/P: 74 year old F with mixed incontinence, history of UTI and lesion of bladder that is stable    Unclear the etiology and she is inclined to monitor given that she is asymptomatic at this time    Urine cytology and if negative plan to monitor, consider repeat cystoscopy in 6 months    If UTIs and/or hematuria recur then will recommend biopsy    20 minutes were spent today on the date of the encounter in reviewing the EMR, direct patient care, coordination of care and documentation in addition to the cystoscopy  procedure    Rebecca Shepherd MD MPH  (she/her/hers)   of Urology  [facility]    CC  Patient Care Team:  Corina Lara NP as PCP - General  Brian, Abdiel Huber MD as MD (Critical Care)  Corina Lara NP as Assigned PCP  Esther Quintana PA-C as Physician Assistant  Esther Quintana PA-C as Assigned Surgical Provider  Brian, Abdiel Huber MD as Assigned Pulmonology Provider                  Again, thank you for allowing me to participate in the care of your patient.      Sincerely,    Rebecca Shepherd MD

## 2025-04-24 ENCOUNTER — ANCILLARY PROCEDURE (OUTPATIENT)
Dept: GENERAL RADIOLOGY | Facility: CLINIC | Age: 75
End: 2025-04-24
Attending: PHYSICIAN ASSISTANT
Payer: COMMERCIAL

## 2025-04-24 ENCOUNTER — OFFICE VISIT (OUTPATIENT)
Dept: URGENT CARE | Facility: URGENT CARE | Age: 75
End: 2025-04-24
Payer: COMMERCIAL

## 2025-04-24 VITALS
BODY MASS INDEX: 36.65 KG/M2 | HEART RATE: 76 BPM | DIASTOLIC BLOOD PRESSURE: 78 MMHG | SYSTOLIC BLOOD PRESSURE: 125 MMHG | HEIGHT: 65 IN | TEMPERATURE: 98.3 F | RESPIRATION RATE: 17 BRPM | WEIGHT: 220 LBS | OXYGEN SATURATION: 97 %

## 2025-04-24 DIAGNOSIS — J40 BRONCHITIS: Primary | ICD-10-CM

## 2025-04-24 RX ORDER — FLUTICASONE PROPIONATE AND SALMETEROL 250; 50 UG/1; UG/1
1 POWDER RESPIRATORY (INHALATION) EVERY 12 HOURS
Qty: 60 EACH | Refills: 0 | Status: SHIPPED | OUTPATIENT
Start: 2025-04-24

## 2025-04-24 RX ORDER — BENZONATATE 200 MG/1
200 CAPSULE ORAL 3 TIMES DAILY PRN
Qty: 30 CAPSULE | Refills: 0 | Status: SHIPPED | OUTPATIENT
Start: 2025-04-24 | End: 2025-05-04

## 2025-04-24 NOTE — PROGRESS NOTES
Bronchitis  - XR Chest 2 Views  - fluticasone-salmeterol (ADVAIR) 250-50 MCG/ACT inhaler; Inhale 1 puff into the lungs every 12 hours.  - benzonatate (TESSALON) 200 MG capsule; Take 1 capsule (200 mg) by mouth 3 times daily as needed for cough.    General Tips for All Ages:    Rest:  Why: Allows your body to focus on healing.  What to Do:  Get plenty of rest and avoid overexertion.    Hydration:  Why: Helps thin mucus and soothes the respiratory tract.  What to Do:  Drink ample fluids, including water, herbal teas, and clear broths.    OTC Cough Suppressants (if recommended):  Why: Eases coughing and promotes rest.  What to Do:  Use over-the-counter cough suppressants as directed.    For Children (Under 12 Years):    OTC Pediatric Cough Medications (if approved by provider):  Why: Manages cough symptoms.  What to Do:  Administer pediatric cough medications as recommended by your child's pediatrician.    Humidifier Use:  Why: Adds moisture to the air, easing breathing.  What to Do:  Use a humidifier in your child's room, especially during sleep.    For Adolescents (12-17 Years):    OTC Cough Suppressants:  Why: Eases persistent coughing.  What to Do:  Take over-the-counter cough suppressants as directed.    Stay Active:  Why: Gentle exercise can aid in recovery.  What to Do:  Engage in light physical activity as tolerated.    For Adults (18 Years and Older):    OTC Cough Suppressants:  Why: Eases coughing for better rest.  What to Do:  Use over-the-counter cough suppressants as directed.    NSAIDs (Nonsteroidal Anti-Inflammatory Drugs):  Why: Reduces inflammation and alleviates discomfort.  What to Do:  Take NSAIDs like ibuprofen as directed.    All Ages:    Warm Salt Gargle:  Why: Soothes a sore throat.  What to Do:  Gargle with warm salt water several times a day.    Deep Breathing Exercises:  Why: Helps clear mucus and improve lung function.  What to Do:  Practice deep breathing exercises  regularly.    Nutrient-Rich Diet:  Why: Supports overall health and recovery.  What to Do:  Consume a balanced diet with fruits, vegetables, and protein.    Avoid Smoke and Irritants:  Why: Prevents respiratory irritation.  What to Do:  Steer clear of smoke, strong odors, and environmental irritants.    Follow-Up Care:    Patient advised to return to clinic for reevaluation (either UC or PCP) if symptoms do not improve in 10 days. Patient educated on red flag symptoms and asked to go directly to the ED if these symptoms present themselves.     Seek Medical Attention:    When: If symptoms persist or worsen.  What to Do:  Consult with your healthcare provider if your symptoms don't improve or if you experience difficulty breathing.  Remember, bronchitis may take time to fully resolve. Follow these guidelines, and if you have any concerns or need personalized advice, don't hesitate to contact your healthcare provider.    Wishing you a speedy recovery!      KRYSTLE Omer [facility]    Subjective   74 year old who presents to clinic today for the following health issues:    Cough       HPI     Acute Illness  Acute illness concerns: Persistent cough for the last three weeks. Also complain of some wheezing and post nasal drop. Negative covid test at home  Onset/Duration: 3 weeks  Symptoms:  Fever: No  Chills/Sweats: No  Headache (location?): No  Sinus Pressure: No  Conjunctivitis:  No  Ear Pain: no  Rhinorrhea: Post-nasal drip   Congestion: YES  Sore Throat: Throat irritation   Cough: YES  Wheeze: YES- Off and on   Decreased Appetite: No  Nausea: No  Vomiting: No  Diarrhea: No  Dysuria/Freq.: No  Dysuria or Hematuria: No  Fatigue/Achiness: No  Sick/Strep Exposure: None known   Therapies tried and outcome: Mucinex     Pulmonary fibrosis.     Review of Systems   Review of Systems   See HPI    Objective    Temp: 98.3  F (36.8  C) Temp src: Oral BP: 125/78 Pulse: 76   Resp: 17 SpO2: 97 %        Physical Exam   Physical Exam  Constitutional:       General: She is not in acute distress.     Appearance: Normal appearance. She is normal weight. She is not ill-appearing, toxic-appearing or diaphoretic.   HENT:      Head: Normocephalic and atraumatic.      Right Ear: Tympanic membrane, ear canal and external ear normal. There is no impacted cerumen.      Left Ear: Tympanic membrane, ear canal and external ear normal. There is no impacted cerumen.      Nose: Congestion present. No rhinorrhea.      Mouth/Throat:      Mouth: Mucous membranes are moist.      Pharynx: No oropharyngeal exudate or posterior oropharyngeal erythema.   Cardiovascular:      Rate and Rhythm: Normal rate and regular rhythm.      Pulses: Normal pulses.      Heart sounds: Normal heart sounds. No murmur heard.     No friction rub. No gallop.   Pulmonary:      Effort: Pulmonary effort is normal. No respiratory distress.      Breath sounds: No stridor. Wheezing present. No rhonchi or rales.   Chest:      Chest wall: No tenderness.   Lymphadenopathy:      Cervical: No cervical adenopathy.   Neurological:      General: No focal deficit present.      Mental Status: She is alert and oriented to person, place, and time. Mental status is at baseline.      Gait: Gait normal.   Psychiatric:         Mood and Affect: Mood normal.         Behavior: Behavior normal.         Thought Content: Thought content normal.         Judgment: Judgment normal.          Results for orders placed or performed in visit on 04/24/25 (from the past 24 hours)   XR Chest 2 Views    Narrative    EXAM: XR CHEST 2 VIEWS  LOCATION: [facility]  DATE: 4/24/2025    INDICATION:  Subacute cough  COMPARISON: 12/14/2023      Impression    IMPRESSION: There some minimal residual fibrotic change in the lung bases similar to prior study. No acute new findings. Stable tortuous thoracic aorta. Stable simple compression fractures in the mid and lower thoracic spine.

## 2025-05-10 ENCOUNTER — HEALTH MAINTENANCE LETTER (OUTPATIENT)
Age: 75
End: 2025-05-10

## 2025-06-05 ENCOUNTER — OFFICE VISIT (OUTPATIENT)
Dept: UROLOGY | Facility: CLINIC | Age: 75
End: 2025-06-05
Payer: COMMERCIAL

## 2025-06-05 VITALS
HEART RATE: 62 BPM | OXYGEN SATURATION: 98 % | BODY MASS INDEX: 35.78 KG/M2 | SYSTOLIC BLOOD PRESSURE: 139 MMHG | WEIGHT: 215 LBS | DIASTOLIC BLOOD PRESSURE: 75 MMHG

## 2025-06-05 DIAGNOSIS — N81.6 RECTOCELE: ICD-10-CM

## 2025-06-05 DIAGNOSIS — K59.00 CONSTIPATION, UNSPECIFIED CONSTIPATION TYPE: ICD-10-CM

## 2025-06-05 DIAGNOSIS — N32.9 LESION OF BLADDER: ICD-10-CM

## 2025-06-05 DIAGNOSIS — N39.46 MIXED INCONTINENCE: ICD-10-CM

## 2025-06-05 DIAGNOSIS — M62.89 PELVIC FLOOR DYSFUNCTION: ICD-10-CM

## 2025-06-05 DIAGNOSIS — N81.11 CYSTOCELE, MIDLINE: Primary | ICD-10-CM

## 2025-06-05 DIAGNOSIS — N39.0 RECURRENT UTI: ICD-10-CM

## 2025-06-05 DIAGNOSIS — N81.4 UTEROVAGINAL PROLAPSE: ICD-10-CM

## 2025-06-05 ASSESSMENT — PAIN SCALES - GENERAL: PAINLEVEL_OUTOF10: NO PAIN (0)

## 2025-06-05 NOTE — NURSING NOTE
Chief Complaint   Patient presents with    Follow Up     Had cysto done on 03/26/2025 with Dr. Shephedr.    Asking about prolapse.    Corina Ramsey

## 2025-06-05 NOTE — LETTER
"6/5/2025       RE: Rhonda Trotter  [address]  [facility] [address]     Dear Colleague,    Thank you for referring your patient, Rhonda Trotter, to the [facility] [place] at [facility]. Please see a copy of my visit note below.    Urology Clinic      Name: Rhonda Trotter    MRN: [id number]   YOB: 1950  Accompanied at today's visit by:self                 Chief Complaint:   Hx of UTIs          History of Present Illness:   June 5, 2025    HISTORY:   We have been following 74 year old Rhonda Trotter for hx of UTIs, vaginal atrophy, concern for lichen sclerosus based on hx and exam findings, LO, urinary urgency, constipation, pelvic floor dysfunction, cystocele, rectocele. Had cysto with Dr. Shepherd on 3/27/24 and noted \"small area of erythema\" and  2 ureteral orifices notes on her left, but otherwise unremarkable. She then had repeat cysto with Dr. Shepherd on 3/26/25 and noted stable erythematous bladder lesion with unclear etiology and advised to continue to monitor since asymptomatic. However if developed further UTIs or if hematuria were to recur would recommend biopsy at that time. Urine cytology was negative for cancer cells. Per EMR has continued to be UTI free. She continues to do well on estrogen cream, fiber supplement daily, prn miralax and clobetasol ointment. Has recently lost over 50lbs since last summer and hoping to lose 30lbs more. Here today as has concerns today of vaginal prolapse. Of note, have noted prolapse on previous exams. Reports feeling like there is a ball of tissue stuck inside her vagina. Feels it most days. Does not need to splint to urinate or defecate. No vaginal bleeding. Prolapse more noticeable after losing weight last year. Prolapse worsens with baring down for Bms and doing low back PT exercises. Continues to be UTI free. No gross hematuria. Follows with pulmonology for " fibrosis. Patient voices no other concerns at this time            Allergies:     Allergies   Allergen Reactions     No Known Drug Allergy             Medications:     Current Outpatient Medications   Medication Sig Dispense Refill     alendronate (FOSAMAX) 70 MG tablet TAKE 1 TABLET EVERY 7 DAYS WITH 8 OUNCES OF WATER 30 MINUTES BEFORE BREAKFAST AND REMAIN UPRIGHT DURING THIS TIME 12 tablet 2     calcium carbonate (TUMS) 500 MG chewable tablet Take 1-2 chew tab by mouth daily as needed for heartburn       Calcium-Phosphorus-Vitamin D (CALCIUM GUMMIES) 250-100-500 MG-MG-UNIT CHEW Take 2 tablets by mouth daily as needed       clobetasol (TEMOVATE) 0.05 % external ointment Apply topically twice a week. Apply along vulva 2x/week at night. 60 g 3     Cobalamine Combinations (B-12) 100-5000 MCG SUBL Place 1 tablet under the tongue daily as needed 50 tablet 0     diphenhydrAMINE (BENADRYL) 25 MG capsule Take 1 capsule (25 mg) by mouth nightly as needed for sleep 56 capsule      estradiol (ESTRACE) 0.1 MG/GM vaginal cream Place 2 g vaginally twice a week. 40 g 3     fluticasone (FLONASE) 50 MCG/ACT spray USE 2 SPRAYS IN EACH NOSTRIL DAILY 16 g PRN     fluticasone-salmeterol (ADVAIR) 250-50 MCG/ACT inhaler Inhale 1 puff into the lungs every 12 hours. 60 each 0     levothyroxine (SYNTHROID/LEVOTHROID) 100 MCG tablet Take 1 tablet (100 mcg) by mouth daily. 90 tablet 2     MAGNESIUM PO Take by mouth daily Magnesium 7       multivitamin, therapeutic with minerals (THERA-VIT-M) TABS Take 1 tablet by mouth daily 30 each 0     simvastatin (ZOCOR) 20 MG tablet Take 1 tablet (20 mg) by mouth at bedtime. 90 tablet 2     valsartan (DIOVAN) 160 MG tablet Take 1 tablet (160 mg) by mouth daily. 90 tablet 2     albuterol (PROAIR HFA/PROVENTIL HFA/VENTOLIN HFA) 108 (90 Base) MCG/ACT inhaler Inhale 2 puffs into the lungs every 6 hours as needed for shortness of breath, wheezing or cough (Patient not taking: Reported on 6/5/2025) 18 g 3      No current facility-administered medications for this visit.               Past  Surgical History:     Past Surgical History:   Procedure Laterality Date     ARTHROPLASTY KNEE Left 02/03/2016    Procedure: ARTHROPLASTY KNEE;  Surgeon: Abdiel Ledesma MD;  Location:  OR     ARTHROPLASTY KNEE Right 10/11/2016    Procedure: ARTHROPLASTY KNEE;  Surgeon: Abdiel Ledesma MD;  Location:  OR     BIOPSY  08/2014    breast biopsy was negative     COLONOSCOPY  2013    every 5 years starting at age 50     COLONOSCOPY N/A 03/13/2024    Procedure: Colonoscopy;  Surgeon: Alisha Nowak MD;  Location:  GI     CYSTOSCOPY       orif Left     wrist     SURGICAL HISTORY OF -       wisdom teeth extraction             Physical Exam:     Vitals:    06/05/25 1017   BP: 139/75   Pulse: 62   SpO2: 98%   Weight: 97.5 kg (215 lb)     PSYCH: NAD  EYES: EOMI  NEURO: AAO x3  : Vulva is normal in appearance. No raised lesions, ulcers, rashes noted on external exam. Urethra normal.. Vaginal mucosa atrophic. No pain to palpation on internal or external exam. Cystocele grade 4. Rectocele grade 1. Uterovaginal prolapse grade 2.. Strength 1/5. No obvious masses, lesions, ulcers, bleeding noted on internal or external exam.      LABS:   Creatinine   Date Value Ref Range Status   12/16/2024 0.84 0.51 - 0.95 mg/dL Final   11/09/2020 0.79 0.52 - 1.04 mg/dL Final       Cysto 3/26/25  Cystoscopy Note: After informed consent was obtained patient was prepped and draped in the standard fashion.  The flexible cystoscope was inserted into a normal appearing urethral meatus.  The urothelium was carefully examined and there is a stable erythematous lesion noted in the posterior wall.  There were no tumors, masses, stones, foreign bodies, or other urothelial abnormalities noted.  Bilateral ureteral orifices were noted in the normal orthotopic position and both effluxed clear urine.  The cystoscope was retroflexed and the bladder neck  was unremarkable.  The urethra was carefully examined upon removing the cystoscope and was unremarkable.  Patient tolerated the procedure without complications noted.      CTU 2/16/24   FINDINGS:   LOWER CHEST: Similar bibasilar subpleural septal thickening, mild bronchiectasis and patchy groundglass.     HEPATOBILIARY: Left hepatic lobe cyst. No calcified gallstone.     PANCREAS: Normal.     SPLEEN: Normal.     ADRENAL GLANDS: Normal.     RIGHT KIDNEY/URETER: No stone, solid renal mass or hydronephrosis. Duplicated collecting system. Right upper pole moiety ureter is not well-opacified limiting the evaluation. Otherwise, no abnormality on excretory imaging.     LEFT KIDNEY/URETER: No stone solid renal mass or hydronephrosis. Duplicated collecting system. No abnormality on excretory imaging.     BLADDER: Unremarkable.     BOWEL: No obstruction. Mild mural fat deposition in the terminal ileum likely reflects sequelae of chronic inflammation. A few scattered nonenlarged lymph nodes are favored reactive.     LYMPH NODES: No enlarged lymph node by size criteria.     VASCULATURE: Nonaneurysmal abdominal aorta. Mild atherosclerosis.     PELVIC ORGANS: Normal.     MUSCULOSKELETAL: Tiny fat-containing umbilical hernia. Subacute appearing left rib fractures. Similar severe T12 compression deformity. Degenerative changes of the thoracolumbar spine.                                                                      IMPRESSION:  1.  Bilateral duplicated renal collecting systems.  2.  Otherwise, no evidence of an upper urothelial tract lesion or renal calculus.  3.  Similar lower lobe pulmonary interstitial lung disease/fibrosis.         Assessment and Plan:   74 year old is a pleasant female who has grade 4 cystocele, grade 1 rectocele, grade 2 uterovaginal prolapse, hx of UTIs, vaginal atrophy, concern for lichen sclerosus based on hx and exam findings, LO, urinary urgency, constipation, pelvic floor dysfunction,  microhematuria    Plan:  -  educated patient about prolapse and tx options of observation, PFPT, pessary or surgical repair. Patient would like to first try PFPT; referral placed. Handouts given on surgical repair and pessary. Patient to contact clinic if would like to try pessary, otherwise plan to follow-up in 4 months for sx check.  - contact clinic if develops gross hematuria or UTI sx in the future. If UTIs return or develops gross hematuria need to consider bladder biopsy at that time.  - continue estrogen cream.  - continue steroid ointment. Plan to repeat exam in 1 year.   - After discussing the assessment and plan with patient, patient verbalizes understanding and agrees to the above plan. All questions answered.       Other orders as below:  Orders Placed This Encounter   Procedures     Physical Therapy  Referral       17 minutes spent on the date of the encounter doing chart review, exam, review of labs, referral to PFPT, review of test results, interpretation of tests, patient visit and documentation.      Esther Quintana PA-C  Urology  June 5, 2025      Patient Care Team:  Corina Lara NP as PCP - General  Abdiel Torres MD as MD (Critical Care)  Corina Lara NP as Assigned PCP  Esther Quintana PA-C as Physician Assistant  Abdiel Torres MD as Assigned Pulmonology Provider  Rebecca Shepherd MD as Assigned Surgical Provider         Again, thank you for allowing me to participate in the care of your patient.      Sincerely,    Esther Quintana PA-C

## 2025-06-24 ENCOUNTER — THERAPY VISIT (OUTPATIENT)
Dept: PHYSICAL THERAPY | Facility: CLINIC | Age: 75
End: 2025-06-24
Payer: COMMERCIAL

## 2025-06-24 DIAGNOSIS — N81.89 PELVIC FLOOR WEAKNESS IN FEMALE: Primary | ICD-10-CM

## 2025-06-24 DIAGNOSIS — N81.11 CYSTOCELE, MIDLINE: ICD-10-CM

## 2025-06-24 DIAGNOSIS — N81.6 RECTOCELE: ICD-10-CM

## 2025-06-24 DIAGNOSIS — N81.4 UTEROVAGINAL PROLAPSE: ICD-10-CM

## 2025-06-24 PROCEDURE — 97530 THERAPEUTIC ACTIVITIES: CPT | Mod: GP | Performed by: PHYSICAL THERAPIST

## 2025-06-24 PROCEDURE — 97161 PT EVAL LOW COMPLEX 20 MIN: CPT | Mod: GP | Performed by: PHYSICAL THERAPIST

## 2025-06-24 PROCEDURE — 97110 THERAPEUTIC EXERCISES: CPT | Mod: GP | Performed by: PHYSICAL THERAPIST

## 2025-06-24 NOTE — PROGRESS NOTES
PHYSICAL THERAPY EVALUATION  Type of Visit: Evaluation       Fall Risk Screen:  Have you fallen 2 or more times in the past year?: No  Have you fallen and had an injury in the past year?: No    Subjective         Presenting condition or subjective complaint: I feel something coming out of my vagina. It's not painful, but is quite annoying.  Pt reports noticing it about early this year when she was in PT for LBP and doing some squats and feeling some pressure in vaginal area.  She reports has lost about 50 lbs in the past year.  Denies difficulty with bowel/bladder function.  She use to have frequent UTIs but has not had any this past year.  She is on vaginal estrogen and that is going well.  Pt notes having 2 bad colds this past year and feels may have exacerbated symptoms.  Denies worsening of symptoms with longer duration on feet or towards end of the day.  Pt has not tried use of pessary, is open but has not been fitted.    Date of onset: 06/05/25 (MD order date)    Relevant medical history: Incontinence; Osteoporosis; Overweight   Past Medical History:   Diagnosis Date    Arthritis     History of colonic polyps     Mumps     Obesity, unspecified     Pulmonary fibrosis (H)     Pure hypercholesterolemia     Unspecified essential hypertension     Unspecified hypothyroidism        Dates & types of surgery: two TKR in 2016, repair compression fraction left wrist 2017   Past Surgical History:   Procedure Laterality Date    ARTHROPLASTY KNEE Left 02/03/2016    Procedure: ARTHROPLASTY KNEE;  Surgeon: Abdiel Ledesma MD;  Location:  OR    ARTHROPLASTY KNEE Right 10/11/2016    Procedure: ARTHROPLASTY KNEE;  Surgeon: Abdiel Ledesma MD;  Location:  OR    BIOPSY  08/2014    breast biopsy was negative    COLONOSCOPY  2013    every 5 years starting at age 52    COLONOSCOPY N/A 03/13/2024    Procedure: Colonoscopy;  Surgeon: Alisha Nowak MD;  Location:  GI    CYSTOSCOPY      orif Left     wrist     "SURGICAL HISTORY OF -       wisdom teeth extraction       Prior diagnostic imaging/testing results:     cystoscopy   Prior therapy history for the same diagnosis, illness or injury: No      Prior Level of Function  Transfers:   Ambulation:   ADL:   IADL:     Living Environment  Social support: Alone   Type of home: Apartment/condo   Stairs to enter the home: No       Ramp: No   Stairs inside the home: No       Help at home: None  Equipment owned:       Employment: Not Applicable    Hobbies/Interests: Visit with friends, walking, paper crafts, domestic travel    Patient goals for therapy: Would like to not have that feeling of something being \"down there\" and believe it's possible to improve the situation with therapy.    Pain assessment: Pain denied     Objective      PELVIC EVALUATION  ADDITIONAL HISTORY:  Sex assigned at birth: Female  Gender identity: Female    Pronouns: She/Her Hers      Bladder History:  Feels bladder filling: yes - feels urges  Triggers for feeling of inability to wait to go to the bathroom: No    How long can you wait to urinate: it's better if I don't wait too long, more than 10 minutes maybe, can wait 2 hours between voids.    Gets up at night to urinate: Yes once, but not every night  Can stop the flow of urine when urinating: Yes - unsure as has not tested  Volume of urine usually released: Average   Other issues: Bladder infections, denies need to strain, reports feels she fully empties.    Number of bladder infections in last 12 months: 0 in last 18 months, but 6 in 2023  Fluid intake per day: 64 oz water,  coffee only when she goes out for breakfast, occasional Diet coke if goes out to eat (1-2 times a month)     Medications taken for bladder: No     Activities causing urine leak: Hurrying to the bathroom due to a strong urge to urinate (pee)  on rare occasion   Amount of urine typically leaked: none if I get to the bathroom in time  Pads used to help with leaking: Yes I use this many " pads per day: one   I use this type/brand: Always  For security    Bowel History:  Frequency of bowel movement: once a day or every other day, does tend towards constipation  Consistency of stool: Soft  bristol stool type 4-5  Ignores the urge to defecate: No  Other bowel issues: Loss of gas  Length of time spent trying to have a bowel movement:      Sexual Function History:  Sexual orientation: Straight    Sexually active: No  Lubrication used: No No  Pelvic pain:    denies pain with use of tampons or pelvic exams in the past  Pain or difficulty with orgasms/erection/ejaculation: No    State of menopause: Post-menopause (I am done with menopause)  Hormone medications: No  vaginal estrogen cream    Are you currently pregnant: No  Number of previous pregnancies: 0  Number of deliveries: 0  Have you been diagnosed with pelvic prolapse or abdominal separation: Yes  Do you get regular exercise: Yes  I do this type of exercise: I walk 30  min. on treadmill 3x per week, plus add steps whenever I can  Have you tried pelvic floor strengthening exercises for 4 weeks: No  Do you have any history of trauma that is relevant to your care that you d like to share: No    Discussed reason for referral regarding pelvic health needs and external/internal pelvic floor muscle examination with patient/guardian.  Opportunity provided to ask questions and verbal consent for assessment and intervention was given.    PAIN:   POSTURE: WNL  LUMBAR SCREEN: AROM WNL  HIP SCREEN:  Strength: WNL   Functional Strength Testing: Double Leg Squat: Improper use of glutes/hips and anterior pelvic tilt    PELVIC/SI SCREEN:  WNL   PAIN PROVOCATION TEST:   PELVIS/SI SPECIAL TESTS:   BREATHING SYMMETRY: Decreased rib cage mobility    PELVIC EXAM  External Visual Inspection:  At rest: Normal  With voluntary pelvic floor contraction: Perineal elevation  Relaxation of PFM: Yes  With intra-abdominal pressure: Cough: Perineal descent  Bearing down as  defecation: Perineal elevation    Integumentary:   Introitus: Unremarkable    External Digital Palpation per Perineum:   Ischiocavernosis: Unremarkable  Bulbo cavernosis: Unremarkable  Transverse perineal: Unremarkable  Levator ani: Unremarkable  Perineal body: Unremarkable    Scar:   Location/Type:   Mobility:     Internal Digital Palpation:  Per Vagina:  Myofascial Resistance to Palpation: Soft  Digital Muscle Performance: P (Power): 3/5  E (Endurance): 5 sec, able to re-recruit and hold x 8 sec  F (Fast Twitch): 5  Relaxation Post-Contraction: Normal    Per Rectum:        Pelvic Organ Prolapse:   Anterior: Past the level of the hymen  Apical: At the level of the hymen  Posterior: At the level of the hymen    ABDOMINAL ASSESSMENT  Diastasis Rectus Abdominis (LINDA):  LINDA presence: No    Abdominal Activation/Strength: Valsalva    Scar:   Location/Type:   Mobility:     Fascial Tension/Restriction:     BIOFEEDBACK:  Position:   Surface Electrodes:     Abdominals:     Perianals:       DERMATOMES:   DTR S:     Assessment & Plan   CLINICAL IMPRESSIONS  Medical Diagnosis: prolapse    Treatment Diagnosis: pelvic floor weakness   Impression/Assessment: Patient is a 74 year old female with prolapse/pelvic floor weakness complaints.  The following significant findings have been identified: Decreased strength, Impaired muscle performance, and Decreased activity tolerance. These impairments interfere with their ability to perform self care tasks, recreational activities, and community mobility as compared to previous level of function.     Clinical Decision Making (Complexity):  Clinical Presentation: Stable/Uncomplicated  Clinical Presentation Rationale: based on medical and personal factors listed in PT evaluation  Clinical Decision Making (Complexity): Low complexity    PLAN OF CARE  Treatment Interventions:  Modalities: Biofeedback, Ultrasound  Interventions: Manual Therapy, Neuromuscular Re-education, Therapeutic Activity,  Therapeutic Exercise, Self-Care/Home Management    Long Term Goals     PT Goal 1  Goal Identifier: Increase PFM endurance  Goal Description: 10 sec  Rationale: to maximize safety and independence with performance of ADLs and functional tasks;to maximize safety and independence with self cares  Goal Progress: current 5 sec hold  Target Date: 08/23/25      Frequency of Treatment: 1 x a week  Duration of Treatment: 3 weeks tapering to 2 times a month x 2 months    Recommended Referrals to Other Professionals: Physical Therapy  Education Assessment:        Risks and benefits of evaluation/treatment have been explained.   Patient/Family/caregiver agrees with Plan of Care.     Evaluation Time:     PT Eval, Low Complexity Minutes (07838): 25       Signing Clinician: KEVIN Barrios [facility]                                                                                   OUTPATIENT PHYSICAL THERAPY      PLAN OF TREATMENT FOR OUTPATIENT REHABILITATION   Patient's Last Name, First Name, MILYRhonda Stafford YOB: 1950   Provider's Name   [facility]   Medical Record No.  [id number]     Onset Date: 06/05/25 (MD order date)  Start of Care Date: 06/24/25     Medical Diagnosis:  prolapse      PT Treatment Diagnosis:  pelvic floor weakness Plan of Treatment  Frequency/Duration: 1 x a week/ 3 weeks tapering to 2 times a month x 2 months    Certification date from 06/24/25 to 09/19/25         See note for plan of treatment details and functional goals     Tay Rueda PT                         I CERTIFY THE NEED FOR THESE SERVICES FURNISHED UNDER        THIS PLAN OF TREATMENT AND WHILE UNDER MY CARE     (Physician attestation of this document indicates review and certification of the therapy plan).              Referring Provider:  Esther Quintana    Initial Assessment  See Epic Evaluation- Start of Care Date: 06/24/25

## 2025-08-05 ENCOUNTER — THERAPY VISIT (OUTPATIENT)
Dept: PHYSICAL THERAPY | Facility: CLINIC | Age: 75
End: 2025-08-05
Attending: PHYSICIAN ASSISTANT
Payer: COMMERCIAL

## 2025-08-05 DIAGNOSIS — N81.89 PELVIC FLOOR WEAKNESS IN FEMALE: ICD-10-CM

## 2025-08-05 DIAGNOSIS — N81.6 RECTOCELE: ICD-10-CM

## 2025-08-05 DIAGNOSIS — N81.4 UTEROVAGINAL PROLAPSE: ICD-10-CM

## 2025-08-05 DIAGNOSIS — N81.11 CYSTOCELE, MIDLINE: Primary | ICD-10-CM

## 2025-08-05 PROCEDURE — 97110 THERAPEUTIC EXERCISES: CPT | Mod: GP | Performed by: PHYSICAL THERAPIST

## 2025-08-05 PROCEDURE — 97530 THERAPEUTIC ACTIVITIES: CPT | Mod: GP | Performed by: PHYSICAL THERAPIST

## 2025-08-20 ENCOUNTER — ANCILLARY PROCEDURE (OUTPATIENT)
Dept: GENERAL RADIOLOGY | Facility: CLINIC | Age: 75
End: 2025-08-20
Attending: NURSE PRACTITIONER
Payer: COMMERCIAL

## 2025-08-20 ENCOUNTER — OFFICE VISIT (OUTPATIENT)
Dept: URGENT CARE | Facility: URGENT CARE | Age: 75
End: 2025-08-20
Payer: COMMERCIAL

## 2025-08-20 VITALS
BODY MASS INDEX: 36.38 KG/M2 | DIASTOLIC BLOOD PRESSURE: 76 MMHG | WEIGHT: 218.6 LBS | OXYGEN SATURATION: 96 % | SYSTOLIC BLOOD PRESSURE: 128 MMHG | HEART RATE: 70 BPM | RESPIRATION RATE: 22 BRPM | TEMPERATURE: 99.2 F

## 2025-08-20 DIAGNOSIS — J84.10 PULMONARY FIBROSIS (H): ICD-10-CM

## 2025-08-20 DIAGNOSIS — R05.1 ACUTE COUGH: Primary | ICD-10-CM

## 2025-08-20 LAB
BASOPHILS # BLD AUTO: <0.04 10E3/UL (ref 0–0.2)
BASOPHILS NFR BLD AUTO: 0.2 %
EOSINOPHIL # BLD AUTO: 0.11 10E3/UL (ref 0–0.7)
EOSINOPHIL NFR BLD AUTO: 2.1 %
ERYTHROCYTE [DISTWIDTH] IN BLOOD BY AUTOMATED COUNT: 13 % (ref 10–15)
HCT VFR BLD AUTO: 39.3 % (ref 35–47)
HGB BLD-MCNC: 14.1 G/DL (ref 11.7–15.7)
IMM GRANULOCYTES # BLD: <0.04 10E3/UL
IMM GRANULOCYTES NFR BLD: 0 %
LYMPHOCYTES # BLD AUTO: 1.17 10E3/UL (ref 0.8–5.3)
LYMPHOCYTES NFR BLD AUTO: 22.5 %
MCH RBC QN AUTO: 33.8 PG (ref 26.5–33)
MCHC RBC AUTO-ENTMCNC: 35.9 G/DL (ref 31.5–36.5)
MCV RBC AUTO: 94.2 FL (ref 78–100)
MONOCYTES # BLD AUTO: 0.63 10E3/UL (ref 0–1.3)
MONOCYTES NFR BLD AUTO: 12.1 %
NEUTROPHILS # BLD AUTO: 3.28 10E3/UL (ref 1.6–8.3)
NEUTROPHILS NFR BLD AUTO: 63.1 %
PLATELET # BLD AUTO: 167 10E3/UL (ref 150–450)
RBC # BLD AUTO: 4.17 10E6/UL (ref 3.8–5.2)
WBC # BLD AUTO: 5.2 10E3/UL (ref 4–11)

## 2025-08-20 PROCEDURE — 85025 COMPLETE CBC W/AUTO DIFF WBC: CPT | Performed by: NURSE PRACTITIONER

## 2025-08-20 PROCEDURE — 71046 X-RAY EXAM CHEST 2 VIEWS: CPT | Mod: TC | Performed by: RADIOLOGY

## (undated) RX ORDER — ONDANSETRON 2 MG/ML
INJECTION INTRAMUSCULAR; INTRAVENOUS
Status: DISPENSED
Start: 2024-03-13